# Patient Record
Sex: MALE | Race: WHITE | NOT HISPANIC OR LATINO | Employment: OTHER | URBAN - METROPOLITAN AREA
[De-identification: names, ages, dates, MRNs, and addresses within clinical notes are randomized per-mention and may not be internally consistent; named-entity substitution may affect disease eponyms.]

---

## 2020-01-07 ENCOUNTER — TRANSFERRED RECORDS (OUTPATIENT)
Dept: HEALTH INFORMATION MANAGEMENT | Facility: CLINIC | Age: 60
End: 2020-01-07

## 2020-01-07 LAB
ALT SERPL-CCNC: 50 U/L (ref 0–55)
AST SERPL-CCNC: 88 U/L (ref 10–40)
CREAT SERPL-MCNC: 0.83 MG/DL (ref 0.73–1.18)
GFR SERPL CREATININE-BSD FRML MDRD: >60 ML/MIN/1.73M2
GLUCOSE SERPL-MCNC: 100 MG/DL (ref 70–100)
INR PPP: 1.4 (ref 0.9–1.1)
POTASSIUM SERPL-SCNC: 3.4 MMOL/L (ref 3.5–5.1)

## 2020-01-14 ENCOUNTER — TRANSFERRED RECORDS (OUTPATIENT)
Dept: HEALTH INFORMATION MANAGEMENT | Facility: CLINIC | Age: 60
End: 2020-01-14

## 2020-01-28 ENCOUNTER — TRANSFERRED RECORDS (OUTPATIENT)
Dept: HEALTH INFORMATION MANAGEMENT | Facility: CLINIC | Age: 60
End: 2020-01-28

## 2020-02-06 ENCOUNTER — MEDICAL CORRESPONDENCE (OUTPATIENT)
Dept: HEALTH INFORMATION MANAGEMENT | Facility: CLINIC | Age: 60
End: 2020-02-06

## 2020-02-10 ENCOUNTER — REFERRAL (OUTPATIENT)
Dept: TRANSPLANT | Facility: CLINIC | Age: 60
End: 2020-02-10

## 2020-02-10 VITALS — HEIGHT: 70 IN | BODY MASS INDEX: 25.77 KG/M2 | WEIGHT: 180 LBS

## 2020-02-10 DIAGNOSIS — K76.9 LIVER LESION: ICD-10-CM

## 2020-02-10 DIAGNOSIS — B19.20 HCV (HEPATITIS C VIRUS): Primary | ICD-10-CM

## 2020-02-10 DIAGNOSIS — B19.20 COMPENSATED HCV CIRRHOSIS (H): ICD-10-CM

## 2020-02-10 DIAGNOSIS — Z78.9 ALCOHOL USE: ICD-10-CM

## 2020-02-10 DIAGNOSIS — K74.69 COMPENSATED HCV CIRRHOSIS (H): ICD-10-CM

## 2020-02-10 SDOH — HEALTH STABILITY: MENTAL HEALTH: HOW MANY STANDARD DRINKS CONTAINING ALCOHOL DO YOU HAVE ON A TYPICAL DAY?: 1 OR 2

## 2020-02-10 SDOH — HEALTH STABILITY: MENTAL HEALTH: HOW OFTEN DO YOU HAVE A DRINK CONTAINING ALCOHOL?: 4 OR MORE TIMES A WEEK

## 2020-02-10 ASSESSMENT — MIFFLIN-ST. JEOR: SCORE: 1647.72

## 2020-02-10 NOTE — LETTER
2/12/2020    Dima Olivas  [address]  [place] [address]      Dear Dima,    Thank you for your interest in the Transplant Center at [facility], [facility]. We look forward to being a part of your care team and assisting you through the transplant process.    As we discussed, your transplant coordinator is Sarah Vyas (Liver).  You may call your coordinator at any time with questions or concerns call [phone].    Please complete the following.    1. Fill out and return the enclosed forms    Authorization for Electronic Communication    Authorization to Discuss Protected Health Information    Authorization for Release of Protected Health Information    Authorization for Care Everywhere Release of Information    2. Sign up for:    [employer], access to your electronic medical record (see enclosed pamphlet)    [email or web address], a transplant education website    You can use these tools to learn more about your transplant, communicate with your care team, and track your medical details      Sincerely,    Solid Organ Transplant  [facility], [facility]    cc: Referring Physician and PCP

## 2020-02-10 NOTE — TELEPHONE ENCOUNTER
Patient was asked the following questions during liver intake call.     Referring Provider: Dr. Blayne Man   Referring Diagnosis: HCV/Cirrhosis of the liver   PCP:Dr. Tracy Feliz   Note: Patient is drinking 1-2 drinks nightly.     1)Do you know why you have liver disease: Yes             If Alcoholic Cirrhosis is present when was your last drink: No             Have you ever been through treatment for alcohol: No  2) Presence of Ascites: No Paracentesis: No  3) Presence of Hepatic Encephalopathy: No Medications: No   4) History of GI Bleeding: No  5) EGD: No  6) Colonoscopy: No  7) MELD Score: No recent labs   8) Insurance information: [facility]      Policy vargas: Self       Subscriber/policy/ID number: [id number]      Group Number: [id number]    Referral intake process completed.  Patient is aware that after financial approval is received, medical records will be requested.   Patient confirmed for a callback from transplant coordinator on   Tentative evaluation date TBD.    Confirmed coordinator will discuss evaluation process in more detail at the time of their call.   Patient is aware of the need to arrange age appropriate cancer screening, vaccinations, and dental care.  Reminded patient to complete questionnaire, complete medical records release, and review packet prior to evaluation visit .  Assessed patient for special needs (ie--wheelchair, assistance, guardian, and ):  None  Patient instructed to call [phone] with questions.     RADHA Oh, LPN   Solid Organ Transplant

## 2020-02-17 PROBLEM — K74.69 COMPENSATED HCV CIRRHOSIS (H): Status: ACTIVE | Noted: 2020-02-17

## 2020-02-17 PROBLEM — K76.9 LIVER LESION: Status: ACTIVE | Noted: 2020-02-17

## 2020-02-17 PROBLEM — B19.20 COMPENSATED HCV CIRRHOSIS (H): Status: ACTIVE | Noted: 2020-02-17

## 2020-02-17 PROBLEM — Z78.9 ALCOHOL USE: Status: ACTIVE | Noted: 2020-02-17

## 2020-02-17 NOTE — TELEPHONE ENCOUNTER
58yo with hx HCV cirrhosis referred for transplant evaluation by Dr. Odom at [facility]. HCV never treated. Pt reports having know he had liver disease for many years including having been advised to quit drinking as far back as 2015. Pt last drink 2/9/20. Pt reported commitment to sobriety however not engaged with any CD program at this time as declined referrals. Recent imaging done 1/28/20 showed new liver lesions. Pt denies drug use (hx 30 years ago), he does smoke. Pt does work as a contractor, he lives with his wife who is supportive and involved.     MELD: 15, labs from 1/7/20 at [facility]  Imaging: MRI done 1/28/20 at [facility]   Ascites: none  HE: none  GIB: no hx  EGD: never   Colonoscopy: never     Medical and Surgical Hx: Snap Shot updated and PCP confirmed    Plan:     Review imaging at tumor conference.     Start with consult due to last alcohol. Pt verbalized understanding and comfort with this plan. Message routed to scheduling.

## 2020-02-21 ENCOUNTER — DOCUMENTATION ONLY (OUTPATIENT)
Dept: TRANSPLANT | Facility: CLINIC | Age: 60
End: 2020-02-21

## 2020-02-21 DIAGNOSIS — K76.9 LIVER LESION: Primary | ICD-10-CM

## 2020-02-21 DIAGNOSIS — K74.60 CIRRHOSIS OF LIVER (H): ICD-10-CM

## 2020-02-21 NOTE — PROGRESS NOTES
1/28/20  1.) 3.1 cm LR-M  2.) LR-3 seg 7    rec  - IR for US guided bx of lesion  - consult with Dr. Parish in clinic

## 2020-02-21 NOTE — Clinical Note
Katelyn - I did not call patient, or enter IR referral/set that up. Was waiting for you to call Monday and then send to IR.Thanks, tk

## 2020-02-21 NOTE — Clinical Note
Hello- please schedule pt into held slot for him on 3/4 with Dr. Parish. Additionally, pt needs IR consult scheduled for new liver lesion. He is aware of the plan. Let me know if you have any questions. Thanks, Katelyn

## 2020-02-24 NOTE — PROGRESS NOTES
Pt called and updated of recommendations from tumor conference. IR referral placed and message routed for scheduling. Pt verbalized understanding and comfort with plan.

## 2020-02-27 NOTE — TELEPHONE ENCOUNTER
ace  RECORDS RECEIVED FROM: [facility]IVER [facility] per RB   DATE RECEIVED: 03.04.2020   NOTES STATUS DETAILS   OFFICE NOTE from referring provider Internal 02.26.2020   OFFICE NOTES from other specialists Care Everywhere 12.17.2019   DISCHARGE SUMMARY from hospital N/A    MEDICATION LIST N/A    LIVER BIOSPY (IF APPLICABLE)      PATHOLOGY REPORTS  N/A    IMAGING     ENDOSCOPY (IF AVAILABLE) N/A    COLONOSCOPY (IF AVAILABLE) N/A    ULTRASOUND LIVER Care Everywhere 01.14.2020 [facility]   CT OF ABDOMEN N/A    MRI OF LIVER Care Everywhere 01.28.2020 [facility]   FIBROSCAN, US ELASTOGRAPHY, FIBROSIS SCAN, MR ELASTOGRAPHY N/A    LABS     HEPATIC PANEL (LIVER PANEL) Care Everywhere 01.07.2020   BASIC METABOLIC PANEL Care Everywhere 01.07.2020   COMPLETE METABOLIC PANEL Care Everywhere 01.07.2020   COMPLETE BLOOD COUNT (CBC) Care Everywhere 01.07.2020   INTERNATIONAL NORMALIZED RATIO (INR) Internal 01.07.2020   HEPATITIS C ANTIBODY Care Everywhere 03.26.2015   HEPATITIS C VIRAL LOAD/PCR N/A    HEPATITIS C GENOTYPE N/A    HEPATITIS B SURFACE ANTIGEN N/A    HEPATITIS B SURFACE ANTIBODY N/A    HEPATITIS B DNA QUANT LEVEL N/A    HEPATITIS B CORE ANTIBODY N/A

## 2020-02-28 ENCOUNTER — TELEPHONE (OUTPATIENT)
Dept: VASCULAR SURGERY | Facility: CLINIC | Age: 60
End: 2020-02-28

## 2020-02-28 NOTE — TELEPHONE ENCOUNTER
Called and left a msg informing pt that we were contacted for a liver biopsy per Dr Parish     Informed him that we have him scheduled for a biopsy consult with Rosey HARPER on Tues 3/3 @ 10am.     If he cannot make this appt then he call the scheduling line at [phone].

## 2020-03-02 ENCOUNTER — TELEPHONE (OUTPATIENT)
Dept: GASTROENTEROLOGY | Facility: CLINIC | Age: 60
End: 2020-03-02

## 2020-03-02 NOTE — TELEPHONE ENCOUNTER
DIAGNOSIS: NP Consult Liver biopsy ref Jem Oakes   DATE RECEIVED: 3.3.20   NOTES STATUS DETAILS   OFFICE NOTE from referring provider Internal 2.21.20 Dr. Jem Montoya Eric   OFFICE NOTE from other specialist CE 2.6.20, 1.7.20 Dr. Arpan Odom   DISCHARGE SUMMARY from hospital N/A    DISCHARGE REPORT from the ER N/A    OPERATIVE REPORT N/A    MEDICATION LIST Internal    XRAYS (IMAGES & REPORTS) In Pacs 1.28.20- MR Liver  1.14.20- US Abd RUQ   PATHOLOGY  Slides & report N/A

## 2020-03-03 ENCOUNTER — PRE VISIT (OUTPATIENT)
Dept: INTERVENTIONAL RADIOLOGY/VASCULAR | Facility: CLINIC | Age: 60
End: 2020-03-03

## 2020-03-03 ENCOUNTER — OFFICE VISIT (OUTPATIENT)
Dept: INTERVENTIONAL RADIOLOGY/VASCULAR | Facility: CLINIC | Age: 60
End: 2020-03-03
Payer: COMMERCIAL

## 2020-03-03 VITALS
WEIGHT: 180.2 LBS | OXYGEN SATURATION: 99 % | BODY MASS INDEX: 25.86 KG/M2 | HEART RATE: 82 BPM | SYSTOLIC BLOOD PRESSURE: 134 MMHG | DIASTOLIC BLOOD PRESSURE: 78 MMHG

## 2020-03-03 DIAGNOSIS — K76.9 LIVER LESION: Primary | ICD-10-CM

## 2020-03-03 DIAGNOSIS — K74.69 COMPENSATED HCV CIRRHOSIS (H): ICD-10-CM

## 2020-03-03 DIAGNOSIS — B19.20 COMPENSATED HCV CIRRHOSIS (H): ICD-10-CM

## 2020-03-03 PROBLEM — D69.6 THROMBOCYTOPENIA (H): Status: ACTIVE | Noted: 2020-03-03

## 2020-03-03 LAB
ALBUMIN SERPL-MCNC: 2.4 G/DL (ref 3.4–5)
ALP SERPL-CCNC: 112 U/L (ref 40–150)
ALT SERPL W P-5'-P-CCNC: 73 U/L (ref 0–70)
ANION GAP SERPL CALCULATED.3IONS-SCNC: 5 MMOL/L (ref 3–14)
AST SERPL W P-5'-P-CCNC: 124 U/L (ref 0–45)
BILIRUB DIRECT SERPL-MCNC: 1.2 MG/DL (ref 0–0.2)
BILIRUB SERPL-MCNC: 2.4 MG/DL (ref 0.2–1.3)
BUN SERPL-MCNC: 8 MG/DL (ref 7–30)
CALCIUM SERPL-MCNC: 8.4 MG/DL (ref 8.5–10.1)
CHLORIDE SERPL-SCNC: 111 MMOL/L (ref 94–109)
CO2 SERPL-SCNC: 26 MMOL/L (ref 20–32)
CREAT SERPL-MCNC: 0.76 MG/DL (ref 0.66–1.25)
ERYTHROCYTE [DISTWIDTH] IN BLOOD BY AUTOMATED COUNT: 15.4 % (ref 10–15)
GFR SERPL CREATININE-BSD FRML MDRD: >90 ML/MIN/{1.73_M2}
GLUCOSE SERPL-MCNC: 142 MG/DL (ref 70–99)
HCT VFR BLD AUTO: 41.9 % (ref 40–53)
HGB BLD-MCNC: 14.4 G/DL (ref 13.3–17.7)
INR PPP: 1.43 (ref 0.86–1.14)
MCH RBC QN AUTO: 33.3 PG (ref 26.5–33)
MCHC RBC AUTO-ENTMCNC: 34.4 G/DL (ref 31.5–36.5)
MCV RBC AUTO: 97 FL (ref 78–100)
PLATELET # BLD AUTO: 54 10E9/L (ref 150–450)
POTASSIUM SERPL-SCNC: 3.5 MMOL/L (ref 3.4–5.3)
PROT SERPL-MCNC: 6.5 G/DL (ref 6.8–8.8)
RBC # BLD AUTO: 4.33 10E12/L (ref 4.4–5.9)
SODIUM SERPL-SCNC: 143 MMOL/L (ref 133–144)
WBC # BLD AUTO: 6.3 10E9/L (ref 4–11)

## 2020-03-03 RX ORDER — B-COMPLEX WITH VITAMIN C
250 TABLET ORAL DAILY
COMMUNITY
End: 2021-01-01

## 2020-03-03 ASSESSMENT — ENCOUNTER SYMPTOMS
BLOATING: 0
LIGHT-HEADEDNESS: 0
ABDOMINAL PAIN: 0
HYPERTENSION: 1
FATIGUE: 1
EYE IRRITATION: 1
MYALGIAS: 1
ORTHOPNEA: 0
POLYPHAGIA: 0
JOINT SWELLING: 0
CONSTIPATION: 0
WEIGHT GAIN: 1
NIGHT SWEATS: 1
DOUBLE VISION: 0
WEIGHT LOSS: 1
VOMITING: 0
HALLUCINATIONS: 0
EYE PAIN: 0
INCREASED ENERGY: 0
HEARTBURN: 1
CHILLS: 1
SLEEP DISTURBANCES DUE TO BREATHING: 0
EYE REDNESS: 1
DECREASED APPETITE: 0
BACK PAIN: 1
POLYDIPSIA: 0
BRUISES/BLEEDS EASILY: 1
PALPITATIONS: 0
ALTERED TEMPERATURE REGULATION: 0
SYNCOPE: 0
MUSCLE CRAMPS: 1
HYPOTENSION: 0
FEVER: 0
DIARRHEA: 0
NAUSEA: 0
SWOLLEN GLANDS: 0
NECK PAIN: 1
EYE WATERING: 1
ARTHRALGIAS: 1
LEG PAIN: 0

## 2020-03-03 NOTE — LETTER
3/3/2020       RE: Dima Olivas  [address]  [facility] [address]     Dear Colleague,    Thank you for referring your patient, Dima Olivas, to the [facility] INTERVENTIONAL RADIOLOGY at [facility]. Please see a copy of my visit note below.    First Name: Dima   Age: 59 year old   Referring Physician: Dr. Jem Reyes   REASON FOR REFERRAL: Liver Lesion Biopsy Consultation     Assessment:   Dima is a 59 year old year old male who has a hx of hep C. He is here today as he has been referred by Dr. Jem Reyes to have liver lesion biopsy to evaluate for malignancy    Plan:   Procedure: US guided liver lesion biopsy  Lab: 3/3/2020 - INR 1.43 Platelets 54 Hemoglobin 14.4 - will draw again day of procedure  Medication: No medication changes needed prior to biopsy    HPI:   Dima is a 59-year-old gentleman who is hep C positive, has a history of alcohol abuse, and has a history of IV drug abuse, with a report that he has not used any IV drugs in 30 years. He also has a history of platelet count in the 50's-60's and frequent bloody stools. He was seen by gastroenterology Jan 2020 as requested from his PCP since he has insurance now. While being worked up for hep C treatment, imaging showed hepatic lesions. He was referred from [facility] to [facility] transplant office for further work-up.  His case was presented at the liver tumor conference, Dr. Zimmer was present from Interventional Radiology, his ultrasound and MRi were reviewed there.  The only lesion that can be seen on MRI and ultrasound is the 3.1 cm lesion in segment 8, measuring smaller on ultrasound.  Series 12 Image 14        Past Medical History:   Diagnosis Date     Hepatitis     Hep C, type 3a     Hx of intravenous drug use in remission     quit 30 yrs ago     No past surgical history on file.  Family History   Problem Relation Age of Onset     Unknown/Adopted Mother      Unknown/Adopted Father       Social History     Tobacco Use     Smoking status: Current Every Day Smoker     Packs/day: 0.30     Types: Cigarettes     Smokeless tobacco: Never Used   Substance Use Topics     Alcohol use: Not Currently     Alcohol/week: 7.0 standard drinks     Types: 7 Cans of beer per week     Frequency: 4 or more times a week     Drinks per session: 1 or 2     Comment: 1-2 drinks a day      Patient Active Problem List    Diagnosis Date Noted     Thrombocytopenia (H) 03/03/2020     Priority: Medium     Compensated HCV cirrhosis (H) 02/17/2020     Priority: Medium     Liver lesion 02/17/2020     Priority: Medium     Alcohol use 02/17/2020     Priority: Medium     Bilateral inguinal hernia without obstruction or gangrene, recurrence not specified 12/03/2016     Priority: Medium     Tobacco abuse 02/02/2015     Priority: Medium     Prescription Medications as of 3/3/2020       Rx Number Disp Refills Start End Last Dispensed Date Next Fill Date Owning Pharmacy    vitamin B1 (THIAMINE) 250 MG tablet            Sig: Take 250 mg by mouth daily    Class: Historical    Route: Oral        Allergies:  Patient has no known allergies.  Vital Signs:  VS w/ IP 3/3/2020   SYSTOLIC 134   DIASTOLIC 78   PULSE 82   Wt.(Lbs.) 180.2   Wt. (Kg) 81.738 kg   Ht. (Feet./Inches)    Ht. (Cm)    BMI    O2 Sat 99     ROS:  Answers for HPI/ROS submitted by the patient on 3/3/2020   General Symptoms: Yes  Skin Symptoms: No  HENT Symptoms: No  EYE SYMPTOMS: Yes  HEART SYMPTOMS: Yes  LUNG SYMPTOMS: No  INTESTINAL SYMPTOMS: Yes  URINARY SYMPTOMS: No  REPRODUCTIVE SYMPTOMS: No  SKELETAL SYMPTOMS: Yes  BLOOD SYMPTOMS: Yes  NERVOUS SYSTEM SYMPTOMS: No  MENTAL HEALTH SYMPTOMS: No  Fever: No  Loss of appetite: No  Weight loss: Yes  Weight gain: Yes  Fatigue: Yes  Night sweats: Yes  Chills: Yes  Increased stress: Yes  Excessive hunger: No  Excessive thirst: No  Feeling hot or cold when others believe the temperature is normal: No  Loss of height:  No  Post-operative complications: No  Surgical site pain: No  Hallucinations: No  Change in or Loss of Energy: No  Hyperactivity: No  Confusion: No  Eye pain: No  Vision loss: No  Dry eyes: Yes  Watery eyes: Yes  Eye bulging: No  Double vision: No  Flashing of lights: No  Spots: No  Floaters: No  Redness: Yes  Crossed eyes: No  Tunnel Vision: No  Yellowing of eyes: No  Eye irritation: Yes  Chest pain or pressure: No  Fast or irregular heartbeat: No  Pain in legs with walking: No  Trouble breathing while lying down: No  Fingers or toes appear blue: No  High blood pressure: Yes  Low blood pressure: No  Fainting: No  Murmurs: No  Pacemaker: No  Varicose veins: No  Edema or swelling: No  Wake up at night with shortness of breath: No  Light-headedness: No  Heart burn or indigestion: Yes  Nausea: No  Vomiting: No  Abdominal pain: No  Bloating: No  Constipation: No  Diarrhea: No  Back pain: Yes  Muscle aches: Yes  Neck pain: Yes  Swollen joints: No  Joint pain: Yes  Bone pain: Yes  Muscle cramps: Yes  Anemia: Yes  Swollen glands: No  Easy bleeding or bruising: Yes    Physical Examination: Vital signs reviewed and are Stable  Constitutional: alert and no distress  Cardiovascular: No lifts, heaves, or thrills. RRR. No murmurs, clicks gallops or rub  Respiratory: Good diaphragmatic excursion. Lungs clear  Musculoskeletal: extremities normal- no gross deformities noted, gait normal and normal muscle tone  Neurologic: Gait normal.   Psychiatric: affect normal/bright and mentation appears normal.    BMP:   Lab Results   Component Value Date     03/03/2020    POTASSIUM 3.5 03/03/2020    CHLORIDE 111 (H) 03/03/2020    CO2 26 03/03/2020    ANIONGAP 5 03/03/2020     (H) 03/03/2020    BUN 8 03/03/2020    CR 0.76 03/03/2020    GFRESTIMATED >90 03/03/2020    GFRESTBLACK >90 03/03/2020    HERNESTO 8.4 (L) 03/03/2020      CBC:  Lab Results   Component Value Date    WBC 6.3 03/03/2020    RBC 4.33 (L) 03/03/2020    HGB 14.4  03/03/2020    HCT 41.9 03/03/2020    MCV 97 03/03/2020    MCH 33.3 (H) 03/03/2020    MCHC 34.4 03/03/2020    RDW 15.4 (H) 03/03/2020    PLT 54 (L) 03/03/2020     INR:  Lab Results   Component Value Date    INR 1.43 (H) 03/03/2020     Diagnostic studies:   MRI - 1/28/2020 and ultrasound    PROVIDER NOTE:  MARIBEL Cabrera, CNS  Explained the procedure to Dima and his wife.  This included:    Preparing for the procedure, the actual procedure, and recovery.  The risks of the biopsy:  Bleeding, infection, pain related to the procedure, if he has bleeding he could possibly need for an embolization procedure  Explanation that the results usually return after four to seven business days.    Explanation that he would be contacted by Dr. Jem Reyes's office to determine a future plan.  Thank you for involving us in the care of your patient.    Endy Giordano, RADHAN, RN  DNP AG-CNS Student  [facility]  [employer] of [employer]     Time spent with patient 55 minutes, 50 minutes spent on counseling.   Attending CNS (Rosey Husain, MS, APRN, CNS, CRN) :  I performed a history and physical exam of the patient and discussed the patient's management with the CNS student. I reviewed the student's note and agree with the documented findings and plan of care.    Rosey Husain MS, APRN, CNS, CRN  Clinical Nurse Specialist  Interventional Radiology  Voice mail [phone]  Pager [phone]    CC  Patient Care Team:  Tracy Feliz as PCP - General (Family Practice)  MILLER CHOW NAOMI, YUNIOR

## 2020-03-03 NOTE — LETTER
3/3/2020       RE: Dima Olivas  [address]  [facility] [place] [facility] [address]     Dear Colleague,    Thank you for referring your patient, Dima Olivas, to the [facility] INTERVENTIONAL RADIOLOGY at [facility]. Please see a copy of my visit note below.    First Name: Dima   Age: 59 year old   Referring Physician: Dr. Jem Reyes   REASON FOR REFERRAL: Liver Lesion Biopsy Consultation     Assessment:   Dima is a 59 year old year old male who has a hx of hep C. He is here today as he has been referred by Dr. Jem Reyes to have liver lesion biopsy to evaluate for malignancy    Plan:   Procedure: US guided liver lesion biopsy  Lab: 3/3/2020 - INR 1.43 Platelets 54 Hemoglobin 14.4 - will draw again day of procedure  Medication: No medication changes needed prior to biopsy    HPI:   Dima is a 59-year-old gentleman who is hep C positive, has a history of alcohol abuse, and has a history of IV drug abuse, with a report that he has not used any IV drugs in 30 years. He also has a history of platelet count in the 50's-60's and frequent bloody stools. He was seen by gastroenterology Jan 2020 as requested from his PCP since he has insurance now. While being worked up for hep C treatment, imaging showed hepatic lesions. He was referred from [facility] to [facility] transplant office for further work-up.  His case was presented at the liver tumor conference, Dr. Zimmer was present from Interventional Radiology, his ultrasound and MRi were reviewed there.  The only lesion that can be seen on MRI and ultrasound is the 3.1 cm lesion in segment 8, measuring smaller on ultrasound.  Series 12 Image 14        Past Medical History:   Diagnosis Date     Hepatitis     Hep C, type 3a     Hx of intravenous drug use in remission     quit 30 yrs ago     No past surgical history on file.  Family History   Problem Relation Age of Onset     Unknown/Adopted Mother      Unknown/Adopted Father       Social History     Tobacco Use     Smoking status: Current Every Day Smoker     Packs/day: 0.30     Types: Cigarettes     Smokeless tobacco: Never Used   Substance Use Topics     Alcohol use: Not Currently     Alcohol/week: 7.0 standard drinks     Types: 7 Cans of beer per week     Frequency: 4 or more times a week     Drinks per session: 1 or 2     Comment: 1-2 drinks a day      Patient Active Problem List    Diagnosis Date Noted     Thrombocytopenia (H) 03/03/2020     Priority: Medium     Compensated HCV cirrhosis (H) 02/17/2020     Priority: Medium     Liver lesion 02/17/2020     Priority: Medium     Alcohol use 02/17/2020     Priority: Medium     Bilateral inguinal hernia without obstruction or gangrene, recurrence not specified 12/03/2016     Priority: Medium     Tobacco abuse 02/02/2015     Priority: Medium     Prescription Medications as of 3/3/2020       Rx Number Disp Refills Start End Last Dispensed Date Next Fill Date Owning Pharmacy    vitamin B1 (THIAMINE) 250 MG tablet            Sig: Take 250 mg by mouth daily    Class: Historical    Route: Oral        Allergies:  Patient has no known allergies.  Vital Signs:  VS w/ IP 3/3/2020   SYSTOLIC 134   DIASTOLIC 78   PULSE 82   Wt.(Lbs.) 180.2   Wt. (Kg) 81.738 kg   Ht. (Feet./Inches)    Ht. (Cm)    BMI    O2 Sat 99     ROS:  Answers for HPI/ROS submitted by the patient on 3/3/2020   General Symptoms: Yes  Skin Symptoms: No  HENT Symptoms: No  EYE SYMPTOMS: Yes  HEART SYMPTOMS: Yes  LUNG SYMPTOMS: No  INTESTINAL SYMPTOMS: Yes  URINARY SYMPTOMS: No  REPRODUCTIVE SYMPTOMS: No  SKELETAL SYMPTOMS: Yes  BLOOD SYMPTOMS: Yes  NERVOUS SYSTEM SYMPTOMS: No  MENTAL HEALTH SYMPTOMS: No  Fever: No  Loss of appetite: No  Weight loss: Yes  Weight gain: Yes  Fatigue: Yes  Night sweats: Yes  Chills: Yes  Increased stress: Yes  Excessive hunger: No  Excessive thirst: No  Feeling hot or cold when others believe the temperature is normal: No  Loss of height:  No  Post-operative complications: No  Surgical site pain: No  Hallucinations: No  Change in or Loss of Energy: No  Hyperactivity: No  Confusion: No  Eye pain: No  Vision loss: No  Dry eyes: Yes  Watery eyes: Yes  Eye bulging: No  Double vision: No  Flashing of lights: No  Spots: No  Floaters: No  Redness: Yes  Crossed eyes: No  Tunnel Vision: No  Yellowing of eyes: No  Eye irritation: Yes  Chest pain or pressure: No  Fast or irregular heartbeat: No  Pain in legs with walking: No  Trouble breathing while lying down: No  Fingers or toes appear blue: No  High blood pressure: Yes  Low blood pressure: No  Fainting: No  Murmurs: No  Pacemaker: No  Varicose veins: No  Edema or swelling: No  Wake up at night with shortness of breath: No  Light-headedness: No  Heart burn or indigestion: Yes  Nausea: No  Vomiting: No  Abdominal pain: No  Bloating: No  Constipation: No  Diarrhea: No  Back pain: Yes  Muscle aches: Yes  Neck pain: Yes  Swollen joints: No  Joint pain: Yes  Bone pain: Yes  Muscle cramps: Yes  Anemia: Yes  Swollen glands: No  Easy bleeding or bruising: Yes    Physical Examination: Vital signs reviewed and are Stable  Constitutional: alert and no distress  Cardiovascular: No lifts, heaves, or thrills. RRR. No murmurs, clicks gallops or rub  Respiratory: Good diaphragmatic excursion. Lungs clear  Musculoskeletal: extremities normal- no gross deformities noted, gait normal and normal muscle tone  Neurologic: Gait normal.   Psychiatric: affect normal/bright and mentation appears normal.    BMP:   Lab Results   Component Value Date     03/03/2020    POTASSIUM 3.5 03/03/2020    CHLORIDE 111 (H) 03/03/2020    CO2 26 03/03/2020    ANIONGAP 5 03/03/2020     (H) 03/03/2020    BUN 8 03/03/2020    CR 0.76 03/03/2020    GFRESTIMATED >90 03/03/2020    GFRESTBLACK >90 03/03/2020    HERNESTO 8.4 (L) 03/03/2020      CBC:  Lab Results   Component Value Date    WBC 6.3 03/03/2020    RBC 4.33 (L) 03/03/2020    HGB 14.4  03/03/2020    HCT 41.9 03/03/2020    MCV 97 03/03/2020    MCH 33.3 (H) 03/03/2020    MCHC 34.4 03/03/2020    RDW 15.4 (H) 03/03/2020    PLT 54 (L) 03/03/2020     INR:  Lab Results   Component Value Date    INR 1.43 (H) 03/03/2020     Diagnostic studies:   MRI - 1/28/2020 and ultrasound    PROVIDER NOTE:  MARIBEL Cabrera, CNS  Explained the procedure to Dima and his wife.  This included:    Preparing for the procedure, the actual procedure, and recovery.  The risks of the biopsy:  Bleeding, infection, pain related to the procedure, if he has bleeding he could possibly need for an embolization procedure  Explanation that the results usually return after four to seven business days.    Explanation that he would be contacted by Dr. Jem Reyes's office to determine a future plan.  Thank you for involving us in the care of your patient.    Endy Giordano, RADHAN, RN  DNP AG-CNS Student  [facility]  [employer] of Nursing     Time spent with patient 55 minutes, 50 minutes spent on counseling.   Attending CNS (Rosey Husain MS, MARIBEL, CNS, CRN) :  I performed a history and physical exam of the patient and discussed the patient's management with the CNS student. I reviewed the student's note and agree with the documented findings and plan of care.    Rosey Husain MS, MARIBEL, CNS, CRN  Clinical Nurse Specialist  Interventional Radiology  Voice mail [phone]  Pager [phone]  CC  Patient Care Team:  Tracy Feliz as PCP - General (Family Practice)  YUNIOR WITT      Again, thank you for allowing me to participate in the care of your patient.      Sincerely,    MARIBEL Boyd

## 2020-03-03 NOTE — PATIENT INSTRUCTIONS
You are scheduled for your biopsy on Wednesday, March 11, 2020  Report to the [facility] Waiting room at 8:00 AM  The [facility] Waiting Room is located on the 2nd floor (street level) of the [address]  Your procedure is scheduled to start at approximately 9:30 AM    No solid foods or milk products for 6 hours prior on the day of the procedure 3:30 AM  You may have clear liquids until 2 hours prior on the day of the procedure.(water, apple juice, broth, coffee or tea without milk or sugar, jell-o, white grape juice)  7:30 AM    You will need a     If you have any questions you may call the Radiology Nurse Line [phone]

## 2020-03-03 NOTE — PROGRESS NOTES
First Name: Dima   Age: 59 year old   Referring Physician: Dr. Jem Reyes   REASON FOR REFERRAL: Liver Lesion Biopsy Consultation     Assessment:   Dima is a 59 year old year old male who has a hx of hep C. He is here today as he has been referred by Dr. Jem Reyes to have liver lesion biopsy to evaluate for malignancy    Plan:   Procedure: US guided liver lesion biopsy  Lab: 3/3/2020 - INR 1.43 Platelets 54 Hemoglobin 14.4 - will draw again day of procedure  Medication: No medication changes needed prior to biopsy    HPI:   Dima is a 59-year-old gentleman who is hep C positive, has a history of alcohol abuse, and has a history of IV drug abuse, with a report that he has not used any IV drugs in 30 years. He also has a history of platelet count in the 50's-60's and frequent bloody stools. He was seen by gastroenterology Jan 2020 as requested from his PCP since he has insurance now. While being worked up for hep C treatment, imaging showed hepatic lesions. He was referred from [facility] transplant office for further work-up.  His case was presented at the liver tumor conference, Dr. Zimmer was present from Interventional Radiology, his ultrasound and MRi were reviewed there.  The only lesion that can be seen on MRI and ultrasound is the 3.1 cm lesion in segment 8, measuring smaller on ultrasound.  Series 12 Image 14        Past Medical History:   Diagnosis Date     Hepatitis     Hep C, type 3a     Hx of intravenous drug use in remission     quit 30 yrs ago     No past surgical history on file.  Family History   Problem Relation Age of Onset     Unknown/Adopted Mother      Unknown/Adopted Father      Social History     Tobacco Use     Smoking status: Current Every Day Smoker     Packs/day: 0.30     Types: Cigarettes     Smokeless tobacco: Never Used   Substance Use Topics     Alcohol use: Not Currently     Alcohol/week: 7.0 standard drinks     Types: 7 Cans of beer per week      Frequency: 4 or more times a week     Drinks per session: 1 or 2     Comment: 1-2 drinks a day      Patient Active Problem List    Diagnosis Date Noted     Thrombocytopenia (H) 03/03/2020     Priority: Medium     Compensated HCV cirrhosis (H) 02/17/2020     Priority: Medium     Liver lesion 02/17/2020     Priority: Medium     Alcohol use 02/17/2020     Priority: Medium     Bilateral inguinal hernia without obstruction or gangrene, recurrence not specified 12/03/2016     Priority: Medium     Tobacco abuse 02/02/2015     Priority: Medium     Prescription Medications as of 3/3/2020       Rx Number Disp Refills Start End Last Dispensed Date Next Fill Date Owning Pharmacy    vitamin B1 (THIAMINE) 250 MG tablet            Sig: Take 250 mg by mouth daily    Class: Historical    Route: Oral        Allergies:  Patient has no known allergies.  Vital Signs:  VS w/ IP 3/3/2020   SYSTOLIC 134   DIASTOLIC 78   PULSE 82   Wt.(Lbs.) 180.2   Wt. (Kg) 81.738 kg   Ht. (Feet./Inches)    Ht. (Cm)    BMI    O2 Sat 99     ROS:  Answers for HPI/ROS submitted by the patient on 3/3/2020   General Symptoms: Yes  Skin Symptoms: No  HENT Symptoms: No  EYE SYMPTOMS: Yes  HEART SYMPTOMS: Yes  LUNG SYMPTOMS: No  INTESTINAL SYMPTOMS: Yes  URINARY SYMPTOMS: No  REPRODUCTIVE SYMPTOMS: No  SKELETAL SYMPTOMS: Yes  BLOOD SYMPTOMS: Yes  NERVOUS SYSTEM SYMPTOMS: No  MENTAL HEALTH SYMPTOMS: No  Fever: No  Loss of appetite: No  Weight loss: Yes  Weight gain: Yes  Fatigue: Yes  Night sweats: Yes  Chills: Yes  Increased stress: Yes  Excessive hunger: No  Excessive thirst: No  Feeling hot or cold when others believe the temperature is normal: No  Loss of height: No  Post-operative complications: No  Surgical site pain: No  Hallucinations: No  Change in or Loss of Energy: No  Hyperactivity: No  Confusion: No  Eye pain: No  Vision loss: No  Dry eyes: Yes  Watery eyes: Yes  Eye bulging: No  Double vision: No  Flashing of lights: No  Spots: No  Floaters:  No  Redness: Yes  Crossed eyes: No  Tunnel Vision: No  Yellowing of eyes: No  Eye irritation: Yes  Chest pain or pressure: No  Fast or irregular heartbeat: No  Pain in legs with walking: No  Trouble breathing while lying down: No  Fingers or toes appear blue: No  High blood pressure: Yes  Low blood pressure: No  Fainting: No  Murmurs: No  Pacemaker: No  Varicose veins: No  Edema or swelling: No  Wake up at night with shortness of breath: No  Light-headedness: No  Heart burn or indigestion: Yes  Nausea: No  Vomiting: No  Abdominal pain: No  Bloating: No  Constipation: No  Diarrhea: No  Back pain: Yes  Muscle aches: Yes  Neck pain: Yes  Swollen joints: No  Joint pain: Yes  Bone pain: Yes  Muscle cramps: Yes  Anemia: Yes  Swollen glands: No  Easy bleeding or bruising: Yes    Physical Examination: Vital signs reviewed and are Stable  Constitutional: alert and no distress  Cardiovascular: No lifts, heaves, or thrills. RRR. No murmurs, clicks gallops or rub  Respiratory: Good diaphragmatic excursion. Lungs clear  Musculoskeletal: extremities normal- no gross deformities noted, gait normal and normal muscle tone  Neurologic: Gait normal.   Psychiatric: affect normal/bright and mentation appears normal.    BMP:   Lab Results   Component Value Date     03/03/2020    POTASSIUM 3.5 03/03/2020    CHLORIDE 111 (H) 03/03/2020    CO2 26 03/03/2020    ANIONGAP 5 03/03/2020     (H) 03/03/2020    BUN 8 03/03/2020    CR 0.76 03/03/2020    GFRESTIMATED >90 03/03/2020    GFRESTBLACK >90 03/03/2020    HERNESTO 8.4 (L) 03/03/2020      CBC:  Lab Results   Component Value Date    WBC 6.3 03/03/2020    RBC 4.33 (L) 03/03/2020    HGB 14.4 03/03/2020    HCT 41.9 03/03/2020    MCV 97 03/03/2020    MCH 33.3 (H) 03/03/2020    MCHC 34.4 03/03/2020    RDW 15.4 (H) 03/03/2020    PLT 54 (L) 03/03/2020     INR:  Lab Results   Component Value Date    INR 1.43 (H) 03/03/2020     Diagnostic studies:   MRI - 1/28/2020 and ultrasound    PROVIDER  NOTE:  MARIBEL Cabrera, CNS  Explained the procedure to Dima and his wife.  This included:    Preparing for the procedure, the actual procedure, and recovery.  The risks of the biopsy:  Bleeding, infection, pain related to the procedure, if he has bleeding he could possibly need for an embolization procedure  Explanation that the results usually return after four to seven business days.    Explanation that he would be contacted by Dr. Jem Reyes's office to determine a future plan.  Thank you for involving us in the care of your patient.    Endy Giordano, RADHAN, RN  DNP AG-CNS Student  [facility]  [employer]     Time spent with patient 55 minutes, 50 minutes spent on counseling.   Attending CNS (Rosey Husain MS, MARIBEL, CNS, CRN) :  I performed a history and physical exam of the patient and discussed the patient's management with the CNS student. I reviewed the student's note and agree with the documented findings and plan of care.    Rosey Husain MS, APRN, CNS, CRN  Clinical Nurse Specialist  Interventional Radiology  Voice mail [phone]  Pager [phone]  CC  Patient Care Team:  Tracy Feliz as PCP - General (Family Practice)  MILLER CHOW NAOMI, YUNIOR

## 2020-03-04 ENCOUNTER — PRE VISIT (OUTPATIENT)
Dept: GASTROENTEROLOGY | Facility: CLINIC | Age: 60
End: 2020-03-04

## 2020-03-04 ENCOUNTER — HOSPITAL ENCOUNTER (OUTPATIENT)
Facility: CLINIC | Age: 60
End: 2020-03-04
Attending: INTERNAL MEDICINE | Admitting: INTERNAL MEDICINE
Payer: COMMERCIAL

## 2020-03-04 ENCOUNTER — OFFICE VISIT (OUTPATIENT)
Dept: GASTROENTEROLOGY | Facility: CLINIC | Age: 60
End: 2020-03-04
Attending: INTERNAL MEDICINE
Payer: COMMERCIAL

## 2020-03-04 VITALS
WEIGHT: 197.4 LBS | DIASTOLIC BLOOD PRESSURE: 77 MMHG | SYSTOLIC BLOOD PRESSURE: 139 MMHG | TEMPERATURE: 98.2 F | HEART RATE: 87 BPM | BODY MASS INDEX: 28.32 KG/M2 | OXYGEN SATURATION: 100 %

## 2020-03-04 DIAGNOSIS — B19.20 COMPENSATED HCV CIRRHOSIS (H): ICD-10-CM

## 2020-03-04 DIAGNOSIS — K74.69 COMPENSATED HCV CIRRHOSIS (H): ICD-10-CM

## 2020-03-04 DIAGNOSIS — Z12.12 SCREENING FOR COLORECTAL CANCER: ICD-10-CM

## 2020-03-04 DIAGNOSIS — K76.9 LIVER LESION: Primary | ICD-10-CM

## 2020-03-04 DIAGNOSIS — Z12.11 SCREENING FOR COLORECTAL CANCER: ICD-10-CM

## 2020-03-04 PROCEDURE — G0463 HOSPITAL OUTPT CLINIC VISIT: HCPCS | Mod: ZF

## 2020-03-04 ASSESSMENT — PAIN SCALES - GENERAL: PAINLEVEL: NO PAIN (0)

## 2020-03-04 NOTE — LETTER
3/4/2020      RE: Dima NAVAS Skaar  [address]  [place] [address]       [facility]    Hepatology New Patient Visit    Referring provider:  Blayne Man MD      59 year old male    Chief complaint:  Liver mass    HPI:  Cirrhosis  - dx Jan 2020  - HCV/ETOH  - no hx HE, ascites or variceal bleed  - no prior EGD  - HCC screening- see below    HCV  - dx 2009  - hx IVDU, YOSHI 1980s  - GT-3  - no prior rx    The patient comes to clinic this morning with his wife for further evaluation and management of liver lesion.  This was first identified on a screening ultrasound, which was followed with MRI liver in 01/2020.  There is a 3.1x2.5cm lesion in segment 8 as well as a 1.2x1.0 cm LI-RADS 3 lesion in segment 7.  The patient has seen interventional radiology and is due to undergo a liver lesion biopsy next week.      The patient was diagnosed with cirrhosis in 01/2020 after re-presenting to the GI Clinic at [facility] for consideration of treatment for hepatitis C.  He initially had been seen in 2009 for hepatitis C at which point he was not insured and also drinking large amounts of alcohol.  He has thus never been treated for hepatitis C.  He has not undergone screening for esophageal varices.      The patient is well today.  He is still trying to process the fact that he may need a liver transplant in the future and is ambivalent regarding transplant.  He denies any symptoms specific to liver disease.      The patient denies jaundice, abdominal distention, lower extremity edema, lethargy or confusion.      The patient denies melena, hematemesis or hematochezia.      The patient denies fevers, sweats or chills.  He declines an influenza vaccination today.      The patient denies any recent weight loss.  Appetite is good.  The patient reports intermittent bright red blood per rectum which is improved over the past month or so.  He has never had a screening colonoscopy.      The  patient last drank alcohol in 02/2020.  He has a history of alcohol abuse for which he has been to AA in the past and has also attended rehab 3-4 times as well as counseling, most recently around the year 2000.  The patient has drank alcohol since his teens and at one point was drinking a quart of vodka per day for 20 years.      The patient has a history of intravenous and intranasal drug use in the 1980s, which is how he acquired hepatitis C.  He smoked marijuana in the past but denies any current use.      The patient is a current smoker for the past 40 years.  At most, he was smoking was 1-2 packs per day but is currently smoking 3 cigarettes per day.     Medical hx Surgical hx   Past Medical History:   Diagnosis Date     Cirrhosis (H) 01/2020     Hepatitis C 2009     Hx of intravenous drug use in remission     quit 30 yrs ago     Smoker       Past Surgical History:   Procedure Laterality Date     NO HISTORY OF SURGERY            Medications  Prior to Admission medications    Medication Sig Start Date End Date Taking? Authorizing Provider   vitamin B1 (THIAMINE) 250 MG tablet Take 250 mg by mouth daily   Yes Reported, Patient       Allergies  No Known Allergies    Family hx Social hx   Family History   Problem Relation Age of Onset     Unknown/Adopted Mother      Unknown/Adopted Father       Social History     Tobacco Use     Smoking status: Current Every Day Smoker     Packs/day: 0.30     Types: Cigarettes     Smokeless tobacco: Never Used   Substance Use Topics     Alcohol use: Yes     Alcohol/week: 7.0 standard drinks     Types: 7 Cans of beer per week     Frequency: 4 or more times a week     Drinks per session: 1 or 2     Comment: 1-2 drinks a day      Drug use: No     Lives in [place] 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     Review of systems  A 10-point review of systems was negative.    Examination  /77 (BP Location: Right arm, Patient Position: Sitting, Cuff  Size: Adult Regular)   Pulse 87   Temp 98.2  F (36.8  C) (Oral)   Wt 89.5 kg (197 lb 6.4 oz)   SpO2 100%   BMI 28.32 kg/m     Body mass index is 28.32 kg/m .    Gen- well, NAD, A+Ox3, normal color  Eye- EOMI  ENT- MMM, normal oropharynx  Lym- no palpable lymphadenopathy  CVS- S1, S2 normal, no added sounds, RRR  RS- CTA  Abd- soft, non-tender, palpable liver edge, no ascites or splenomegaly on palpation or percussion, BS+  Extr- pulses good, no CARMEN  MS- hands normal- no clubbing  Neuro- A+Ox3, no asterixis  Skin- telangiectasia on chest wall, no jaundice  Psych- normal mood    Laboratory  Lab Results   Component Value Date     03/03/2020    POTASSIUM 3.5 03/03/2020    CHLORIDE 111 03/03/2020    CO2 26 03/03/2020    BUN 8 03/03/2020    CR 0.76 03/03/2020       Lab Results   Component Value Date    BILITOTAL 2.4 03/03/2020    ALT 73 03/03/2020     03/03/2020    ALKPHOS 112 03/03/2020       Lab Results   Component Value Date    ALBUMIN 2.4 03/03/2020    PROTTOTAL 6.5 03/03/2020        Lab Results   Component Value Date    WBC 6.3 03/03/2020    HGB 14.4 03/03/2020    MCV 97 03/03/2020    PLT 54 03/03/2020       Lab Results   Component Value Date    INR 1.43 03/03/2020         Radiology  MRI liver 1/28/2020 personally reviewed    Assessment  59-year-old male with history of compensated HCV/ETOH cirrhosis who presents for further evaluation and management of liver lesion.  MELD-Na= 14.  Last ETOH= 02/2020.  No evidence of ascites or hepatic encephalopathy.  Will obtain liver lesion biopsy to rule in/out HCC which will determine overall future management although the patient is ambivalent to transplant at this time.  Will need upper endoscopy to screen for esophageal varices.  Will also need colonoscopy to evaluate hematochezia in the absence of prior colorectal cancer screening.      We discussed the natural history and complications of cirrhosis; the effect of alcohol use on progression of liver  disease; indications for liver transplant; staging and management of HCC including loco-regional therapy, hepatic resection and liver transplant; the liver transplant evaluation process and the minimum sobriety requirement of 6 months at the [facility] liver transplant program.     Plan  1.  Liver lesion biopsy as planned  2.  CT chest, bone scan if lesion bx consistent with HCC  3.  Chemical dependency evaluation  4.  EGD/colonoscopy  5.  Follow-up in 3 months    Salvador Parish MD  Hepatology  [facility]    I spent a total of 60 minutes face-to-face with Dima Olivas during today's office visit. Over 50% of this time was spent counseling the patient and/or coordinating care regarding natural history and complications of cirrhosis; the effect of alcohol use on progression of liver disease; indications for liver transplant; staging and management of HCC including loco-regional therapy, hepatic resection and liver transplant; the liver transplant evaluation process and the minimum sobriety requirement of 6 months at the [facility] liver transplant program.  See note for details.      Salvador Parish MD

## 2020-03-04 NOTE — NURSING NOTE
Chief Complaint   Patient presents with     Consult     Compensated HCV cirrhosis            /77 (BP Location: Right arm, Patient Position: Sitting, Cuff Size: Adult Regular)   Pulse 87   Temp 98.2  F (36.8  C) (Oral)   Wt 89.5 kg (197 lb 6.4 oz)   SpO2 100%   BMI 28.32 kg/m          Smitha Alves CMA    3/4/2020 8:59 AM

## 2020-03-04 NOTE — PROGRESS NOTES
[facility]    Hepatology New Patient Visit    Referring provider:  Blayne Man MD      59 year old male    Chief complaint:  Liver mass    HPI:  Cirrhosis  - dx Jan 2020  - HCV/ETOH  - no hx HE, ascites or variceal bleed  - no prior EGD  - HCC screening- see below    HCV  - dx 2009  - hx IVDU, YOSHI 1980s  - GT-3  - no prior rx    The patient comes to clinic this morning with his wife for further evaluation and management of liver lesion.  This was first identified on a screening ultrasound, which was followed with MRI liver in 01/2020.  There is a 3.1x2.5cm lesion in segment 8 as well as a 1.2x1.0 cm LI-RADS 3 lesion in segment 7.  The patient has seen interventional radiology and is due to undergo a liver lesion biopsy next week.      The patient was diagnosed with cirrhosis in 01/2020 after re-presenting to the GI Clinic at [facility] for consideration of treatment for hepatitis C.  He initially had been seen in 2009 for hepatitis C at which point he was not insured and also drinking large amounts of alcohol.  He has thus never been treated for hepatitis C.  He has not undergone screening for esophageal varices.      The patient is well today.  He is still trying to process the fact that he may need a liver transplant in the future and is ambivalent regarding transplant.  He denies any symptoms specific to liver disease.      The patient denies jaundice, abdominal distention, lower extremity edema, lethargy or confusion.      The patient denies melena, hematemesis or hematochezia.      The patient denies fevers, sweats or chills.  He declines an influenza vaccination today.      The patient denies any recent weight loss.  Appetite is good.  The patient reports intermittent bright red blood per rectum which is improved over the past month or so.  He has never had a screening colonoscopy.      The patient last drank alcohol in 02/2020.  He has a history of alcohol abuse for which  he has been to  in the past and has also attended rehab 3-4 times as well as counseling, most recently around the year 2000.  The patient has drank alcohol since his teens and at one point was drinking a quart of vodka per day for 20 years.      The patient has a history of intravenous and intranasal drug use in the 1980s, which is how he acquired hepatitis C.  He smoked marijuana in the past but denies any current use.      The patient is a current smoker for the past 40 years.  At most, he was smoking was 1-2 packs per day but is currently smoking 3 cigarettes per day.     Medical hx Surgical hx   Past Medical History:   Diagnosis Date     Cirrhosis (H) 01/2020     Hepatitis C 2009     Hx of intravenous drug use in remission     quit 30 yrs ago     Smoker       Past Surgical History:   Procedure Laterality Date     NO HISTORY OF SURGERY            Medications  Prior to Admission medications    Medication Sig Start Date End Date Taking? Authorizing Provider   vitamin B1 (THIAMINE) 250 MG tablet Take 250 mg by mouth daily   Yes Reported, Patient       Allergies  No Known Allergies    Family hx Social hx   Family History   Problem Relation Age of Onset     Unknown/Adopted Mother      Unknown/Adopted Father       Social History     Tobacco Use     Smoking status: Current Every Day Smoker     Packs/day: 0.30     Types: Cigarettes     Smokeless tobacco: Never Used   Substance Use Topics     Alcohol use: Yes     Alcohol/week: 7.0 standard drinks     Types: 7 Cans of beer per week     Frequency: 4 or more times a week     Drinks per session: 1 or 2     Comment: 1-2 drinks a day      Drug use: No     Lives in [place] 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     Review of systems  A 10-point review of systems was negative.    Examination  /77 (BP Location: Right arm, Patient Position: Sitting, Cuff Size: Adult Regular)   Pulse 87   Temp 98.2  F (36.8  C) (Oral)   Wt 89.5 kg (197  lb 6.4 oz)   SpO2 100%   BMI 28.32 kg/m    Body mass index is 28.32 kg/m .    Gen- well, NAD, A+Ox3, normal color  Eye- EOMI  ENT- MMM, normal oropharynx  Lym- no palpable lymphadenopathy  CVS- S1, S2 normal, no added sounds, RRR  RS- CTA  Abd- soft, non-tender, palpable liver edge, no ascites or splenomegaly on palpation or percussion, BS+  Extr- pulses good, no CARMEN  MS- hands normal- no clubbing  Neuro- A+Ox3, no asterixis  Skin- telangiectasia on chest wall, no jaundice  Psych- normal mood    Laboratory  Lab Results   Component Value Date     03/03/2020    POTASSIUM 3.5 03/03/2020    CHLORIDE 111 03/03/2020    CO2 26 03/03/2020    BUN 8 03/03/2020    CR 0.76 03/03/2020       Lab Results   Component Value Date    BILITOTAL 2.4 03/03/2020    ALT 73 03/03/2020     03/03/2020    ALKPHOS 112 03/03/2020       Lab Results   Component Value Date    ALBUMIN 2.4 03/03/2020    PROTTOTAL 6.5 03/03/2020        Lab Results   Component Value Date    WBC 6.3 03/03/2020    HGB 14.4 03/03/2020    MCV 97 03/03/2020    PLT 54 03/03/2020       Lab Results   Component Value Date    INR 1.43 03/03/2020         Radiology  MRI liver 1/28/2020 personally reviewed    Assessment  59-year-old male with history of compensated HCV/ETOH cirrhosis who presents for further evaluation and management of liver lesion.  MELD-Na= 14.  Last ETOH= 02/2020.  No evidence of ascites or hepatic encephalopathy.  Will obtain liver lesion biopsy to rule in/out HCC which will determine overall future management although the patient is ambivalent to transplant at this time.  Will need upper endoscopy to screen for esophageal varices.  Will also need colonoscopy to evaluate hematochezia in the absence of prior colorectal cancer screening.      We discussed the natural history and complications of cirrhosis; the effect of alcohol use on progression of liver disease; indications for liver transplant; staging and management of HCC including  loco-regional therapy, hepatic resection and liver transplant; the liver transplant evaluation process and the minimum sobriety requirement of 6 months at the [facility] liver transplant program.     Plan  1.  Liver lesion biopsy as planned  2.  CT chest, bone scan if lesion bx consistent with HCC  3.  Chemical dependency evaluation  4.  EGD/colonoscopy  5.  Follow-up in 3 months    Salvador Parish MD  Hepatology  [facility]    I spent a total of 60 minutes face-to-face with Dima Olivas during today's office visit. Over 50% of this time was spent counseling the patient and/or coordinating care regarding natural history and complications of cirrhosis; the effect of alcohol use on progression of liver disease; indications for liver transplant; staging and management of HCC including loco-regional therapy, hepatic resection and liver transplant; the liver transplant evaluation process and the minimum sobriety requirement of 6 months at the [facility] liver transplant program.  See note for details.

## 2020-03-04 NOTE — PATIENT INSTRUCTIONS
"Plan  1.  No alcohol  2.  Upper endoscopy to look for veins in the esophagus (\"varices\") related to liver disease  3.  Colonoscopy to screen for colon cancer (routine care)  4.  Liver lesion biopsy next week  5.  Follow-up with me in 3 months    Salvador Parish MD  Hepatology  [facility]  "

## 2020-03-11 ENCOUNTER — APPOINTMENT (OUTPATIENT)
Dept: MEDSURG UNIT | Facility: CLINIC | Age: 60
End: 2020-03-11
Attending: INTERNAL MEDICINE
Payer: COMMERCIAL

## 2020-03-11 ENCOUNTER — HOSPITAL ENCOUNTER (OUTPATIENT)
Facility: CLINIC | Age: 60
Discharge: HOME | End: 2020-03-11
Attending: INTERNAL MEDICINE | Admitting: RADIOLOGY
Payer: COMMERCIAL

## 2020-03-11 ENCOUNTER — APPOINTMENT (OUTPATIENT)
Dept: INTERVENTIONAL RADIOLOGY/VASCULAR | Facility: CLINIC | Age: 60
End: 2020-03-11
Attending: CLINICAL NURSE SPECIALIST
Payer: COMMERCIAL

## 2020-03-11 VITALS
OXYGEN SATURATION: 96 % | SYSTOLIC BLOOD PRESSURE: 119 MMHG | HEART RATE: 82 BPM | DIASTOLIC BLOOD PRESSURE: 70 MMHG | RESPIRATION RATE: 16 BRPM | TEMPERATURE: 98.5 F

## 2020-03-11 DIAGNOSIS — K74.69 COMPENSATED HCV CIRRHOSIS (H): ICD-10-CM

## 2020-03-11 DIAGNOSIS — B19.20 COMPENSATED HCV CIRRHOSIS (H): ICD-10-CM

## 2020-03-11 DIAGNOSIS — K76.9 LIVER LESION: ICD-10-CM

## 2020-03-11 PROCEDURE — 99152 MOD SED SAME PHYS/QHP 5/>YRS: CPT

## 2020-03-11 PROCEDURE — 88313 SPECIAL STAINS GROUP 2: CPT | Performed by: INTERNAL MEDICINE

## 2020-03-11 PROCEDURE — 88342 IMHCHEM/IMCYTCHM 1ST ANTB: CPT | Performed by: INTERNAL MEDICINE

## 2020-03-11 PROCEDURE — 25000125 ZZHC RX 250: Performed by: STUDENT IN AN ORGANIZED HEALTH CARE EDUCATION/TRAINING PROGRAM

## 2020-03-11 PROCEDURE — 99153 MOD SED SAME PHYS/QHP EA: CPT

## 2020-03-11 PROCEDURE — 27810159 ZZH COIL/EMBOLIC DEVICE CR8

## 2020-03-11 PROCEDURE — 40000167 ZZH STATISTIC PP CARE STAGE 2

## 2020-03-11 PROCEDURE — 88307 TISSUE EXAM BY PATHOLOGIST: CPT | Performed by: INTERNAL MEDICINE

## 2020-03-11 PROCEDURE — 25000128 H RX IP 250 OP 636: Performed by: STUDENT IN AN ORGANIZED HEALTH CARE EDUCATION/TRAINING PROGRAM

## 2020-03-11 PROCEDURE — 25800030 ZZH RX IP 258 OP 636: Performed by: RADIOLOGY

## 2020-03-11 PROCEDURE — 76942 ECHO GUIDE FOR BIOPSY: CPT

## 2020-03-11 RX ORDER — FLUMAZENIL 0.1 MG/ML
0.2 INJECTION, SOLUTION INTRAVENOUS
Status: DISCONTINUED | OUTPATIENT
Start: 2020-03-11 | End: 2020-03-11 | Stop reason: HOSPADM

## 2020-03-11 RX ORDER — SODIUM CHLORIDE 9 MG/ML
INJECTION, SOLUTION INTRAVENOUS CONTINUOUS
Status: DISCONTINUED | OUTPATIENT
Start: 2020-03-11 | End: 2020-03-11 | Stop reason: HOSPADM

## 2020-03-11 RX ORDER — NICOTINE POLACRILEX 4 MG
15-30 LOZENGE BUCCAL
Status: DISCONTINUED | OUTPATIENT
Start: 2020-03-11 | End: 2020-03-11 | Stop reason: HOSPADM

## 2020-03-11 RX ORDER — LIDOCAINE 40 MG/G
CREAM TOPICAL
Status: DISCONTINUED | OUTPATIENT
Start: 2020-03-11 | End: 2020-03-11 | Stop reason: HOSPADM

## 2020-03-11 RX ORDER — NALOXONE HYDROCHLORIDE 0.4 MG/ML
.1-.4 INJECTION, SOLUTION INTRAMUSCULAR; INTRAVENOUS; SUBCUTANEOUS
Status: DISCONTINUED | OUTPATIENT
Start: 2020-03-11 | End: 2020-03-11 | Stop reason: HOSPADM

## 2020-03-11 RX ORDER — DEXTROSE MONOHYDRATE 25 G/50ML
25-50 INJECTION, SOLUTION INTRAVENOUS
Status: DISCONTINUED | OUTPATIENT
Start: 2020-03-11 | End: 2020-03-11 | Stop reason: HOSPADM

## 2020-03-11 RX ORDER — FENTANYL CITRATE 50 UG/ML
25-50 INJECTION, SOLUTION INTRAMUSCULAR; INTRAVENOUS EVERY 5 MIN PRN
Status: DISCONTINUED | OUTPATIENT
Start: 2020-03-11 | End: 2020-03-11 | Stop reason: HOSPADM

## 2020-03-11 RX ADMIN — FENTANYL CITRATE 50 MCG: 50 INJECTION INTRAMUSCULAR; INTRAVENOUS at 10:13

## 2020-03-11 RX ADMIN — FENTANYL CITRATE 25 MCG: 50 INJECTION INTRAMUSCULAR; INTRAVENOUS at 10:25

## 2020-03-11 RX ADMIN — MIDAZOLAM 0.5 MG: 1 INJECTION INTRAMUSCULAR; INTRAVENOUS at 10:18

## 2020-03-11 RX ADMIN — FENTANYL CITRATE 25 MCG: 50 INJECTION INTRAMUSCULAR; INTRAVENOUS at 10:18

## 2020-03-11 RX ADMIN — SODIUM CHLORIDE: 9 INJECTION, SOLUTION INTRAVENOUS at 08:30

## 2020-03-11 RX ADMIN — LIDOCAINE HYDROCHLORIDE 10 ML: 10 INJECTION, SOLUTION EPIDURAL; INFILTRATION; INTRACAUDAL; PERINEURAL at 10:15

## 2020-03-11 RX ADMIN — MIDAZOLAM 1 MG: 1 INJECTION INTRAMUSCULAR; INTRAVENOUS at 10:14

## 2020-03-11 RX ADMIN — MIDAZOLAM 0.5 MG: 1 INJECTION INTRAMUSCULAR; INTRAVENOUS at 10:25

## 2020-03-11 RX ADMIN — MIDAZOLAM 0.5 MG: 1 INJECTION INTRAMUSCULAR; INTRAVENOUS at 10:21

## 2020-03-11 RX ADMIN — FENTANYL CITRATE 25 MCG: 50 INJECTION INTRAMUSCULAR; INTRAVENOUS at 10:21

## 2020-03-11 NOTE — PROGRESS NOTES
Patient arrived on 2A for liver biopsy.  Recent labs reviewed from 3/3.  IV placed.  Consent done.  Prep complete.

## 2020-03-11 NOTE — DISCHARGE INSTRUCTIONS
[facility]    Interventional Radiology  Patient Instructions Following Liver Biopsy    AFTER YOU GO HOME  ? If you were given sedation DO NOT drive or operate machinery at home or at work for at least 24 hours  ? DO relax and take it easy for 48 hours, no strenuous activity for 24 hours  ? DO drink plenty of fluids  ? DO resume your regular diet, unless otherwise instructed by your Primary Physician  ? Keep the dressing dry and in place for 24 hours.  ? DO NOT SMOKE FOR AT LEAST 24 HOURS, if you have been given any medications that were to help you relax or sedate you during your procedure  ? DO NOT drink alcoholic beverages the day of your procedure  ? DO NOT do any strenuous exercise or lifting (> 10 lbs) for at least 7 days following your procedure  ? DO NOT take a bath or shower for at least 12 hours following your procedure  ? Remove dressing after shower the next day. Replace with Band aid for 2 days.  Never leave a wet dressing in place.  ? DO NOT make any important or legal decisions for 24 hours following your procedure  ? There should be minimum drainage from the biopsy site    CALL THE PHYSICIAN IF:  ? You start bleeding from the procedure site.  If you do start to bleed from that site, lie down flat and hold pressure on the site for a minimum of 10 minutes.  Your physician will tell you if you need to return to the hospital  ? You develop nausea or vomiting  ? You have excessive swelling, redness, or tenderness at the site  ? You have drainage that looks like it is infected.  ? You experience severe pain  ? You develop hives or a rash or unexplained itching  ? You develop shortness of breath  ? You develop a temperature of 101 degrees F or greater      [facility] INTERVENTIONAL RADIOLOGY DEPARTMENT  Procedure Physician:  Dr. Zimmer                                Date of procedure: March 11, 2020  Telephone Numbers: [phone] Monday-Friday 8:00 am to 4:30 pm  [phone] After 4:30 pm  Monday-Friday, Weekends & Holidays.   Ask for the Interventional Radiologist on call.  Someone is on call 24 hrs/day  [facility] toll free number: [phone] Monday-Friday 8:00 am to 4:30 pm  [facility] Emergency Dept: [phone]

## 2020-03-11 NOTE — PROCEDURES
[facility], [place]    Procedure: IR Procedure Note    Date/Time: 3/11/2020 10:44 AM  Performed by: Mello Zimmer MD  Authorized by: Mello Zimmer MD   IR Fellow Physician:  Other(s) attending procedure: Maya Sullivan IR RPA    UNIVERSAL PROTOCOL   Site Marked: NA  Prior Images Obtained and Reviewed:  Yes  Required items: Required blood products, implants, devices and special equipment available    Patient identity confirmed:  Verbally with patient, arm band, provided demographic data and hospital-assigned identification number  Patient was reevaluated immediately before administering moderate or deep sedation or anesthesia  Confirmation Checklist:  Patient's identity using two indicators, relevant allergies, procedure was appropriate and matched the consent or emergent situation and correct equipment/implants were available  Time out: Immediately prior to the procedure a time out was called    Universal Protocol: the Joint Commission Universal Protocol was followed    Preparation: Patient was prepped and draped in usual sterile fashion           ANESTHESIA    Anesthesia: Local infiltration  Local Anesthetic:  Lidocaine 1% without epinephrine      SEDATION    Patient Sedated: Yes    Sedation:  Diazepam and fentanyl  Vital signs: Vital signs monitored during sedation    See dictated procedure note for full details.  Findings: Liver lesion segment 7/8    Specimens: core needle biopsy specimens sent for pathological analysis    Complications: None    Condition: Stable    Plan: Follow up with primary provider for results    PROCEDURE   Patient Tolerance:  Patient tolerated the procedure well with no immediate complications  Describe Procedure: US guided liver lesion biopsies preformed. 4 cores  placed in formalin.   Length of time physician/provider present for 1:1 monitoring during sedation: 25

## 2020-03-11 NOTE — PRE-PROCEDURE
GENERAL PRE-PROCEDURE:   Procedure:  Liver lesion biopsy     Verbal consent obtained?: Yes    Written consent obtained?: Yes    Risks and benefits: Risks, benefits and alternatives were discussed    Consent given by:  Patient  Patient states understanding of procedure being performed: Yes    Patient's understanding of procedure matches consent: Yes    Procedure consent matches procedure scheduled: Yes    Expected level of sedation:  Moderate  Appropriately NPO:  Yes  ASA Class:  Class 2- mild systemic disease, no acute problems, no functional limitations  Mallampati  :  Grade 2- soft palate, base of uvula, tonsillar pillars, and portion of posterior pharyngeal wall visible  Lungs:  Lungs clear with good breath sounds bilaterally  Heart:  Normal heart sounds and rate  History & Physical reviewed:  History and physical reviewed and no updates needed  Statement of review:  I have reviewed the lab findings, diagnostic data, medications, and the plan for sedation

## 2020-03-11 NOTE — PROGRESS NOTES
Patient tolerated recovery stage well. VSS, RUQ abdomen site clean/dry/intact, no hematoma, and denies pain. Patient tolerated PO food and fluids. Teaching was done and discharge instructions were given. Patient ambulated, voided, and PIV was removed. Patient discharged from the hospital via wheel chair to home with wife @ 1255.

## 2020-03-11 NOTE — IP AVS SNAPSHOT
MRN:[id number]                      After Visit Summary   3/11/2020    Dima Olivas    MRN: [id number]           Visit Information        Department      3/11/2020  7:40 AM [address] [facility]          Review of your medicines      UNREVIEWED medicines. Ask your doctor about these medicines       Dose / Directions   vitamin B1 250 MG tablet  Commonly known as:  THIAMINE      Dose:  250 mg  Take 250 mg by mouth daily  Refills:  0              Protect others around you: Learn how to safely use, store and throw away your medicines at www.disposemymeds.org.       Follow-ups after your visit       Your next 10 appointments already scheduled    Mar 26, 2020  Procedure with Salvador Reyes MD  [facility], [place], Endoscopy ([facility], [facility]) [address] [id number] [phone]   [facility] is located on the corner of [address] on the [address] [facility]. It is easily accessible from virtually any point in the [place] area, via [address] and [address].   Jun 17, 2020 10:00 AM CDT  Lab with  LAB  [facility] ([facility]) [address] [id number] [phone]      Jun 17, 2020 11:00 AM CDT  (Arrive by 10:45 AM)  Return General Liver with Salvador Reyes MD  [facility] Hepatology ([facility]) [address] [place] [id number] [phone]         Care Instructions       Further instructions from your care team       [facility]    Interventional Radiology  Patient Instructions Following Liver Biopsy    AFTER YOU GO HOME  ? If you were given sedation DO NOT drive or operate machinery at home or at work for at least 24 hours  ? DO relax and take it easy for 48 hours, no strenuous activity for 24 hours  ? DO drink plenty of fluids  ? DO resume your regular diet,  unless otherwise instructed by your Primary Physician  ? Keep the dressing dry and in place for 24 hours.  ? DO NOT SMOKE FOR AT LEAST 24 HOURS, if you have been given any medications that were to help you relax or sedate you during your procedure  ? DO NOT drink alcoholic beverages the day of your procedure  ? DO NOT do any strenuous exercise or lifting (> 10 lbs) for at least 7 days following your procedure  ? DO NOT take a bath or shower for at least 12 hours following your procedure  ? Remove dressing after shower the next day. Replace with Band aid for 2 days.  Never leave a wet dressing in place.  ? DO NOT make any important or legal decisions for 24 hours following your procedure  ? There should be minimum drainage from the biopsy site    CALL THE PHYSICIAN IF:  ? You start bleeding from the procedure site.  If you do start to bleed from that site, lie down flat and hold pressure on the site for a minimum of 10 minutes.  Your physician will tell you if you need to return to the hospital  ? You develop nausea or vomiting  ? You have excessive swelling, redness, or tenderness at the site  ? You have drainage that looks like it is infected.  ? You experience severe pain  ? You develop hives or a rash or unexplained itching  ? You develop shortness of breath  ? You develop a temperature of 101 degrees F or greater      [facility] INTERVENTIONAL RADIOLOGY DEPARTMENT  Procedure Physician:  Dr. Zimmer                                Date of procedure: March 11, 2020  Telephone Numbers: [phone] Monday-Friday 8:00 am to 4:30 pm  [phone] After 4:30 pm Monday-Friday, Weekends & Holidays.   Ask for the Interventional Radiologist on call.  Someone is on call 24 hrs/day  [facility] toll free number: [phone] Monday-Friday 8:00 am to 4:30 pm  [facility] Emergency Dept: [phone]          Additional Information About Your Visit       [employer] Information    [employer] lets you send messages to your doctor, view your test results,  "renew your prescriptions, schedule appointments and more. To sign up, go to [email or web address] . Click on \"Log in\" on the left side of the screen, which will take you to the Welcome page. Then click on \"Sign up Now\" on the right side of the page.     You will be asked to enter the access code listed below, as well as some personal information. Please follow the directions to create your username and password.     Your access code is: [email or web address]  Expires: 2020 11:45 AM     Your access code will  in 60 days. If you need help or a new code, please call your   [facility] clinic or [phone].       Care EveryWhere ID    This is your Care EveryWhere ID. This could be used by other organizations to access your [facility] medical records  [id number]       Your Vitals Were  Most recent update: 3/11/2020 11:46 AM    Blood Pressure   139/79 (BP Location: Left arm, Cuff Size: Adult Regular)    Pulse   82    Temperature   98.5  F (36.9  C) (Oral)    Respirations   16    Pulse Oximetry   100%          Primary Care Provider Office Phone # Fax #    Tracy Feliz [phone]      Equal Access to Services    HEATHER ASKEW : Hadii aaron ku hadasho Soomaali, waaxda luqadaha, qaybta kaalmada adeegyada, thierry pineda. So [facility] [phone].    ATENCIÓN: Si habla español, tiene a navarrete disposición servicios gratuitos de asistencia lingüística. Shara al [phone].    We comply with applicable federal and state civil rights laws, including the [place] Human Rights Act. We do not discriminate on the basis of race, color, creed, Jehovah's witness, national origin, marital status, age, disability, sex, sexual orientation, or gender identity.       Thank you!    Thank you for choosing [facility] for your care. Our goal is always to provide you with excellent care. Hearing back from our patients is one way we can continue to improve our services. Please take a few minutes to " complete the written survey that you may receive in the mail after you visit with us. Thank you!            Medication List      ASK your doctor about these medications          Morning Afternoon Evening Bedtime As Needed    vitamin B1 250 MG tablet  Also known as:  THIAMINE  INSTRUCTIONS:  Take 250 mg by mouth daily

## 2020-03-11 NOTE — PROGRESS NOTES
Patient Name: Dima Olivas  Medical Record Number: [id number]  Today's Date: 3/11/2020    Procedure: Image guided liver lesion biopsy  Proceduralist: Dr. Zimmer, Uziel Mejia PA-C, Maya Sullivan RPA    Procedure Start: 1013  Procedure end: 1037  Sedation medications administered: 2.5mg versed, 125mcg fentanyl     Report given to: Earnestine HERRERA 2A  : ANGELICA    Other Notes: Pt arrived to IR room 6 from . Consent reviewed. Pt denies any questions or concerns regarding procedure. Pt positioned supine and monitored per protocol. Pt tolerated procedure without any noted complications. Pathology called at beginning of case for support but returned phone call saying they would not come because of the size and type of lesion. 4 Cores put into formalin as directed by them. Pt transferred back to 2A.

## 2020-03-11 NOTE — PROGRESS NOTES
Patient Education    [facility] PARENT  15 THROUGH 17 YEAR VISITS  Here are some suggestions from [employer] experts that may be of value to your family.     HOW YOUR FAMILY IS DOING  Set aside time to be with your teen and really listen to her hopes and concerns.  Support your teen in finding activities that interest him. Encourage your teen to help others in the community.  Help your teen find and be a part of positive after-school activities and sports.  Support your teen as she figures out ways to deal with stress, solve problems, and make decisions.  Help your teen deal with conflict.  If you are worried about your living or food situation, talk with us. Community agencies and programs such as SNAP can also provide information.    YOUR GROWING AND CHANGING TEEN  Make sure your teen visits the dentist at least twice a year.  Give your teen a fluoride supplement if the dentist recommends it.  Support your teen s healthy body weight and help him be a healthy eater.  Provide healthy foods.  Eat together as a family.  Be a role model.  Help your teen get enough calcium with low-fat or fat-free milk, low-fat yogurt, and cheese.  Encourage at least 1 hour of physical activity a day.  Praise your teen when she does something well, not just when she looks good.    YOUR TEEN S FEELINGS  If you are concerned that your teen is sad, depressed, nervous, irritable, hopeless, or angry, let us know.  If you have questions about your teen s sexual development, you can always talk with us.    HEALTHY BEHAVIOR CHOICES  Know your teen s friends and their parents. Be aware of where your teen is and what he is doing at all times.  Talk with your teen about your values and your expectations on drinking, drug use, tobacco use, driving, and sex.  Praise your teen for healthy decisions about sex, tobacco, alcohol, and other drugs.  Be a role model.  Know your teen s friends and their activities together.  Lock your  Patient returned to 2A post liver biopsy.  VSS.  Sleepy, but arousable.  Requiring 3L nasal cannula for O2 sats >92% - will wean as tolerated.  RUQ abdomen site C/D/I, no hematoma, denies pain.  Water at bedside, will offer food when more wakeful.  Wife at bedside.   liquor in a cabinet.  Store prescription medications in a locked cabinet.  Be there for your teen when she needs support or help in making healthy decisions about her behavior.    SAFETY  Encourage safe and responsible driving habits.  Lap and shoulder seat belts should be used by everyone.  Limit the number of friends in the car and ask your teen to avoid driving at night.  Discuss with your teen how to avoid risky situations, who to call if your teen feels unsafe, and what you expect of your teen as a .  Do not tolerate drinking and driving.  If it is necessary to keep a gun in your home, store it unloaded and locked with the ammunition locked separately from the gun.      Consistent with Bright Futures: Guidelines for Health Supervision of Infants, Children, and Adolescents, 4th Edition  For more information, go to https://brightfutures.aap.org.

## 2020-03-11 NOTE — IP AVS SNAPSHOT
[address] [id number]                                    After Visit Summary   3/11/2020    Dima Olivas    MRN: [id number]           After Visit Summary Signature Page    I have received my discharge instructions, and my questions have been answered. I have discussed any challenges I see with this plan with the nurse or doctor.    ..........................................................................................................................................  Patient/Patient Representative Signature      ..........................................................................................................................................  Patient Representative Print Name and Relationship to Patient    ..................................................               ................................................  Date                                   Time    ..........................................................................................................................................  Reviewed by Signature/Title    ...................................................              ..............................................  Date                                               Time          22EPIC Rev 08/18

## 2020-03-13 LAB — COPATH REPORT: NORMAL

## 2020-03-16 ENCOUNTER — DOCUMENTATION ONLY (OUTPATIENT)
Dept: TRANSPLANT | Facility: CLINIC | Age: 60
End: 2020-03-16

## 2020-03-16 ENCOUNTER — TELEPHONE (OUTPATIENT)
Dept: GASTROENTEROLOGY | Facility: CLINIC | Age: 60
End: 2020-03-16

## 2020-03-16 DIAGNOSIS — K74.60 CIRRHOSIS OF LIVER (H): Primary | ICD-10-CM

## 2020-03-16 NOTE — PROGRESS NOTES
Per Dr. Parish liver lesion bx reviewed- no evidence of HCC, hold on tx evaluation for now, repeat imaging in 3 months, follow-up in clinic as scheduled.     Evaluation closed. Order placed for repeat imaging per Dr. Parish. Pt updated of above. Importance of ongoing follow up including imaging reviewed. Scheduling number provided for pt to call and schedule MRI. Pt verbalized understanding and denied questions or concerns at this time.

## 2020-05-18 ENCOUNTER — DOCUMENTATION ONLY (OUTPATIENT)
Dept: CARE COORDINATION | Facility: CLINIC | Age: 60
End: 2020-05-18

## 2020-06-12 DIAGNOSIS — K74.69 COMPENSATED HCV CIRRHOSIS (H): Primary | ICD-10-CM

## 2020-06-12 DIAGNOSIS — B19.20 COMPENSATED HCV CIRRHOSIS (H): Primary | ICD-10-CM

## 2020-06-15 ENCOUNTER — TELEPHONE (OUTPATIENT)
Dept: TRANSPLANT | Facility: CLINIC | Age: 60
End: 2020-06-15

## 2020-06-15 NOTE — TELEPHONE ENCOUNTER
Left a voicemail for patient to convert their in person visit with Dr. Parish on 6/17/20 to a video visit.

## 2020-06-17 ENCOUNTER — APPOINTMENT (OUTPATIENT)
Dept: GASTROENTEROLOGY | Facility: CLINIC | Age: 60
End: 2020-06-17
Attending: INTERNAL MEDICINE
Payer: COMMERCIAL

## 2021-01-01 ENCOUNTER — INFUSION THERAPY VISIT (OUTPATIENT)
Dept: ONCOLOGY | Facility: CLINIC | Age: 61
End: 2021-01-01
Attending: INTERNAL MEDICINE
Payer: COMMERCIAL

## 2021-01-01 ENCOUNTER — ANCILLARY PROCEDURE (OUTPATIENT)
Dept: GENERAL RADIOLOGY | Facility: CLINIC | Age: 61
End: 2021-01-01
Attending: PHYSICIAN ASSISTANT
Payer: COMMERCIAL

## 2021-01-01 ENCOUNTER — APPOINTMENT (OUTPATIENT)
Dept: GENERAL RADIOLOGY | Facility: CLINIC | Age: 61
End: 2021-01-01
Payer: COMMERCIAL

## 2021-01-01 ENCOUNTER — APPOINTMENT (OUTPATIENT)
Dept: CT IMAGING | Facility: CLINIC | Age: 61
End: 2021-01-01
Payer: COMMERCIAL

## 2021-01-01 ENCOUNTER — PATIENT OUTREACH (OUTPATIENT)
Dept: ONCOLOGY | Facility: CLINIC | Age: 61
End: 2021-01-01

## 2021-01-01 ENCOUNTER — TELEPHONE (OUTPATIENT)
Dept: GASTROENTEROLOGY | Facility: CLINIC | Age: 61
End: 2021-01-01

## 2021-01-01 ENCOUNTER — APPOINTMENT (OUTPATIENT)
Dept: LAB | Facility: CLINIC | Age: 61
End: 2021-01-01
Attending: INTERNAL MEDICINE
Payer: COMMERCIAL

## 2021-01-01 ENCOUNTER — APPOINTMENT (OUTPATIENT)
Dept: ULTRASOUND IMAGING | Facility: CLINIC | Age: 61
End: 2021-01-01
Attending: EMERGENCY MEDICINE
Payer: COMMERCIAL

## 2021-01-01 ENCOUNTER — HEALTH MAINTENANCE LETTER (OUTPATIENT)
Age: 61
End: 2021-01-01

## 2021-01-01 ENCOUNTER — TELEPHONE (OUTPATIENT)
Dept: ONCOLOGY | Facility: CLINIC | Age: 61
End: 2021-01-01

## 2021-01-01 ENCOUNTER — HOSPITAL ENCOUNTER (INPATIENT)
Facility: CLINIC | Age: 61
LOS: 8 days | Discharge: HOSPICE | End: 2021-11-10
Attending: EMERGENCY MEDICINE | Admitting: INTERNAL MEDICINE
Payer: COMMERCIAL

## 2021-01-01 ENCOUNTER — LAB (OUTPATIENT)
Dept: LAB | Facility: CLINIC | Age: 61
End: 2021-01-01
Payer: COMMERCIAL

## 2021-01-01 ENCOUNTER — ANCILLARY PROCEDURE (OUTPATIENT)
Dept: MRI IMAGING | Facility: CLINIC | Age: 61
End: 2021-01-01
Attending: FAMILY MEDICINE
Payer: COMMERCIAL

## 2021-01-01 ENCOUNTER — HOSPITAL ENCOUNTER (OUTPATIENT)
Dept: CT IMAGING | Facility: CLINIC | Age: 61
End: 2021-10-13
Attending: INTERNAL MEDICINE
Payer: COMMERCIAL

## 2021-01-01 ENCOUNTER — APPOINTMENT (OUTPATIENT)
Dept: CT IMAGING | Facility: CLINIC | Age: 61
End: 2021-01-01
Attending: NURSE PRACTITIONER
Payer: COMMERCIAL

## 2021-01-01 ENCOUNTER — VIRTUAL VISIT (OUTPATIENT)
Dept: GASTROENTEROLOGY | Facility: CLINIC | Age: 61
End: 2021-01-01
Attending: PHYSICIAN ASSISTANT
Payer: COMMERCIAL

## 2021-01-01 ENCOUNTER — OFFICE VISIT (OUTPATIENT)
Dept: FAMILY MEDICINE | Facility: CLINIC | Age: 61
End: 2021-01-01
Payer: COMMERCIAL

## 2021-01-01 ENCOUNTER — VIRTUAL VISIT (OUTPATIENT)
Dept: ONCOLOGY | Facility: CLINIC | Age: 61
End: 2021-01-01
Attending: PHYSICIAN ASSISTANT
Payer: COMMERCIAL

## 2021-01-01 ENCOUNTER — HOSPITAL ENCOUNTER (OUTPATIENT)
Facility: AMBULATORY SURGERY CENTER | Age: 61
Discharge: HOME | End: 2021-09-24
Attending: INTERNAL MEDICINE | Admitting: INTERNAL MEDICINE
Payer: COMMERCIAL

## 2021-01-01 ENCOUNTER — OFFICE VISIT (OUTPATIENT)
Dept: URGENT CARE | Facility: URGENT CARE | Age: 61
End: 2021-01-01
Payer: COMMERCIAL

## 2021-01-01 ENCOUNTER — HOSPITAL ENCOUNTER (OUTPATIENT)
Dept: MRI IMAGING | Facility: CLINIC | Age: 61
End: 2021-10-13
Attending: INTERNAL MEDICINE
Payer: COMMERCIAL

## 2021-01-01 VITALS
OXYGEN SATURATION: 99 % | WEIGHT: 206.8 LBS | HEART RATE: 110 BPM | RESPIRATION RATE: 16 BRPM | BODY MASS INDEX: 29.67 KG/M2 | DIASTOLIC BLOOD PRESSURE: 72 MMHG | SYSTOLIC BLOOD PRESSURE: 170 MMHG

## 2021-01-01 VITALS
BODY MASS INDEX: 25.77 KG/M2 | HEIGHT: 70 IN | OXYGEN SATURATION: 96 % | HEART RATE: 70 BPM | DIASTOLIC BLOOD PRESSURE: 68 MMHG | SYSTOLIC BLOOD PRESSURE: 127 MMHG | RESPIRATION RATE: 18 BRPM | WEIGHT: 180 LBS | TEMPERATURE: 98 F

## 2021-01-01 VITALS
OXYGEN SATURATION: 100 % | TEMPERATURE: 98.7 F | DIASTOLIC BLOOD PRESSURE: 74 MMHG | SYSTOLIC BLOOD PRESSURE: 170 MMHG | HEART RATE: 85 BPM | RESPIRATION RATE: 16 BRPM

## 2021-01-01 VITALS
RESPIRATION RATE: 18 BRPM | DIASTOLIC BLOOD PRESSURE: 87 MMHG | SYSTOLIC BLOOD PRESSURE: 162 MMHG | WEIGHT: 183 LBS | HEART RATE: 94 BPM | TEMPERATURE: 98.1 F | OXYGEN SATURATION: 100 % | BODY MASS INDEX: 26.26 KG/M2

## 2021-01-01 VITALS
BODY MASS INDEX: 29.1 KG/M2 | HEIGHT: 70 IN | DIASTOLIC BLOOD PRESSURE: 75 MMHG | WEIGHT: 203.26 LBS | SYSTOLIC BLOOD PRESSURE: 145 MMHG | TEMPERATURE: 97.8 F | RESPIRATION RATE: 16 BRPM | OXYGEN SATURATION: 95 % | HEART RATE: 81 BPM

## 2021-01-01 DIAGNOSIS — Z11.52 ENCOUNTER FOR SCREENING LABORATORY TESTING FOR SEVERE ACUTE RESPIRATORY SYNDROME CORONAVIRUS 2 (SARS-COV-2): ICD-10-CM

## 2021-01-01 DIAGNOSIS — S20.211A CONTUSION OF RIB ON RIGHT SIDE, INITIAL ENCOUNTER: Primary | ICD-10-CM

## 2021-01-01 DIAGNOSIS — R60.0 PERIPHERAL EDEMA: ICD-10-CM

## 2021-01-01 DIAGNOSIS — C22.0 HCC (HEPATOCELLULAR CARCINOMA) (H): Primary | ICD-10-CM

## 2021-01-01 DIAGNOSIS — C22.0 HCC (HEPATOCELLULAR CARCINOMA) (H): ICD-10-CM

## 2021-01-01 DIAGNOSIS — K76.9 LIVER LESION: ICD-10-CM

## 2021-01-01 DIAGNOSIS — Z11.59 ENCOUNTER FOR SCREENING FOR OTHER VIRAL DISEASES: ICD-10-CM

## 2021-01-01 DIAGNOSIS — K85.10 ACUTE BILIARY PANCREATITIS, UNSPECIFIED COMPLICATION STATUS: ICD-10-CM

## 2021-01-01 DIAGNOSIS — K74.69 COMPENSATED HCV CIRRHOSIS (H): Primary | ICD-10-CM

## 2021-01-01 DIAGNOSIS — K74.60 CIRRHOSIS OF LIVER WITHOUT ASCITES, UNSPECIFIED HEPATIC CIRRHOSIS TYPE (H): ICD-10-CM

## 2021-01-01 DIAGNOSIS — K72.00 ACUTE LIVER FAILURE WITHOUT HEPATIC COMA: ICD-10-CM

## 2021-01-01 DIAGNOSIS — K76.9 LIVER LESION: Primary | ICD-10-CM

## 2021-01-01 DIAGNOSIS — B19.20 HEPATITIS C VIRUS INFECTION WITHOUT HEPATIC COMA, UNSPECIFIED CHRONICITY: ICD-10-CM

## 2021-01-01 DIAGNOSIS — R07.81 RIB TENDERNESS: ICD-10-CM

## 2021-01-01 DIAGNOSIS — B19.20 COMPENSATED HCV CIRRHOSIS (H): ICD-10-CM

## 2021-01-01 DIAGNOSIS — K21.00 GASTROESOPHAGEAL REFLUX DISEASE WITH ESOPHAGITIS WITHOUT HEMORRHAGE: Primary | ICD-10-CM

## 2021-01-01 DIAGNOSIS — R03.0 ELEVATED BLOOD PRESSURE READING WITHOUT DIAGNOSIS OF HYPERTENSION: ICD-10-CM

## 2021-01-01 DIAGNOSIS — B19.20 COMPENSATED HCV CIRRHOSIS (H): Primary | ICD-10-CM

## 2021-01-01 DIAGNOSIS — R30.0 DYSURIA: ICD-10-CM

## 2021-01-01 DIAGNOSIS — K74.69 COMPENSATED HCV CIRRHOSIS (H): ICD-10-CM

## 2021-01-01 DIAGNOSIS — R25.2 CRAMP OF LIMB: ICD-10-CM

## 2021-01-01 DIAGNOSIS — N17.9 ACUTE KIDNEY INJURY (H): ICD-10-CM

## 2021-01-01 DIAGNOSIS — C22.0 HEPATOCELLULAR CARCINOMA (H): ICD-10-CM

## 2021-01-01 DIAGNOSIS — R18.8 OTHER ASCITES: ICD-10-CM

## 2021-01-01 LAB
ABO/RH(D): NORMAL
ABO/RH(D): NORMAL
AFP SERPL-MCNC: 10.5 UG/L (ref 0–8)
AFP SERPL-MCNC: 27.4 UG/L (ref 0–8)
AFP SERPL-MCNC: 39.4 UG/L (ref 0–8)
ALBUMIN FLD-MCNC: 0.2 G/DL
ALBUMIN SERPL-MCNC: 1.5 G/DL (ref 3.4–5)
ALBUMIN SERPL-MCNC: 1.8 G/DL (ref 3.4–5)
ALBUMIN SERPL-MCNC: 2 G/DL (ref 3.4–5)
ALBUMIN SERPL-MCNC: 2.2 G/DL (ref 3.4–5)
ALBUMIN SERPL-MCNC: 2.3 G/DL (ref 3.4–5)
ALBUMIN SERPL-MCNC: 2.6 G/DL (ref 3.4–5)
ALBUMIN SERPL-MCNC: 2.7 G/DL (ref 3.4–5)
ALBUMIN SERPL-MCNC: 2.8 G/DL (ref 3.4–5)
ALBUMIN SERPL-MCNC: 2.9 G/DL (ref 3.4–5)
ALBUMIN SERPL-MCNC: 3 G/DL (ref 3.4–5)
ALBUMIN SERPL-MCNC: 3 G/DL (ref 3.4–5)
ALBUMIN SERPL-MCNC: 3.4 G/DL (ref 3.4–5)
ALBUMIN UR-MCNC: 10 MG/DL
ALBUMIN UR-MCNC: ABNORMAL MG/DL
ALP SERPL-CCNC: 100 U/L (ref 40–150)
ALP SERPL-CCNC: 100 U/L (ref 40–150)
ALP SERPL-CCNC: 102 U/L (ref 40–150)
ALP SERPL-CCNC: 110 U/L (ref 40–150)
ALP SERPL-CCNC: 110 U/L (ref 40–150)
ALP SERPL-CCNC: 112 U/L (ref 40–150)
ALP SERPL-CCNC: 114 U/L (ref 40–150)
ALP SERPL-CCNC: 127 U/L (ref 40–150)
ALP SERPL-CCNC: 135 U/L (ref 40–150)
ALP SERPL-CCNC: 149 U/L (ref 40–150)
ALP SERPL-CCNC: 163 U/L (ref 40–150)
ALP SERPL-CCNC: 183 U/L (ref 40–150)
ALP SERPL-CCNC: 88 U/L (ref 40–150)
ALP SERPL-CCNC: 93 U/L (ref 40–150)
ALP SERPL-CCNC: 93 U/L (ref 40–150)
ALP SERPL-CCNC: 98 U/L (ref 40–150)
ALT SERPL W P-5'-P-CCNC: 100 U/L (ref 0–70)
ALT SERPL W P-5'-P-CCNC: 113 U/L (ref 0–70)
ALT SERPL W P-5'-P-CCNC: 182 U/L (ref 0–70)
ALT SERPL W P-5'-P-CCNC: 52 U/L (ref 0–70)
ALT SERPL W P-5'-P-CCNC: 57 U/L (ref 0–70)
ALT SERPL W P-5'-P-CCNC: 59 U/L (ref 0–70)
ALT SERPL W P-5'-P-CCNC: 60 U/L (ref 0–70)
ALT SERPL W P-5'-P-CCNC: 64 U/L (ref 0–70)
ALT SERPL W P-5'-P-CCNC: 66 U/L (ref 0–70)
ALT SERPL W P-5'-P-CCNC: 68 U/L (ref 0–70)
ALT SERPL W P-5'-P-CCNC: 74 U/L (ref 0–70)
ALT SERPL W P-5'-P-CCNC: 75 U/L (ref 0–70)
ALT SERPL W P-5'-P-CCNC: 75 U/L (ref 0–70)
ALT SERPL W P-5'-P-CCNC: 76 U/L (ref 0–70)
ALT SERPL W P-5'-P-CCNC: 85 U/L (ref 0–70)
ALT SERPL W P-5'-P-CCNC: 86 U/L (ref 0–70)
AMMONIA PLAS-SCNC: 21 UMOL/L (ref 10–50)
ANION GAP SERPL CALCULATED.3IONS-SCNC: 10 MMOL/L (ref 3–14)
ANION GAP SERPL CALCULATED.3IONS-SCNC: 12 MMOL/L (ref 3–14)
ANION GAP SERPL CALCULATED.3IONS-SCNC: 13 MMOL/L (ref 3–14)
ANION GAP SERPL CALCULATED.3IONS-SCNC: 14 MMOL/L (ref 3–14)
ANION GAP SERPL CALCULATED.3IONS-SCNC: 15 MMOL/L (ref 3–14)
ANION GAP SERPL CALCULATED.3IONS-SCNC: 15 MMOL/L (ref 3–14)
ANION GAP SERPL CALCULATED.3IONS-SCNC: 3 MMOL/L (ref 3–14)
ANION GAP SERPL CALCULATED.3IONS-SCNC: 5 MMOL/L (ref 3–14)
ANION GAP SERPL CALCULATED.3IONS-SCNC: 5 MMOL/L (ref 3–14)
ANION GAP SERPL CALCULATED.3IONS-SCNC: 6 MMOL/L (ref 3–14)
ANTIBODY SCREEN: NEGATIVE
APPEARANCE FLD: ABNORMAL
APPEARANCE UR: CLEAR
AST SERPL W P-5'-P-CCNC: 146 U/L (ref 0–45)
AST SERPL W P-5'-P-CCNC: 150 U/L (ref 0–45)
AST SERPL W P-5'-P-CCNC: 171 U/L (ref 0–45)
AST SERPL W P-5'-P-CCNC: 174 U/L (ref 0–45)
AST SERPL W P-5'-P-CCNC: 182 U/L (ref 0–45)
AST SERPL W P-5'-P-CCNC: 58 U/L (ref 0–45)
AST SERPL W P-5'-P-CCNC: 65 U/L (ref 0–45)
AST SERPL W P-5'-P-CCNC: 66 U/L (ref 0–45)
AST SERPL W P-5'-P-CCNC: 68 U/L (ref 0–45)
AST SERPL W P-5'-P-CCNC: 69 U/L (ref 0–45)
AST SERPL W P-5'-P-CCNC: 70 U/L (ref 0–45)
AST SERPL W P-5'-P-CCNC: 72 U/L (ref 0–45)
AST SERPL W P-5'-P-CCNC: 74 U/L (ref 0–45)
AST SERPL W P-5'-P-CCNC: 78 U/L (ref 0–45)
AST SERPL W P-5'-P-CCNC: 83 U/L (ref 0–45)
AST SERPL W P-5'-P-CCNC: 94 U/L (ref 0–45)
ATRIAL RATE - MUSE: 78 BPM
BACTERIA #/AREA URNS HPF: ABNORMAL /HPF
BACTERIA BLD CULT: NO GROWTH
BACTERIA BLD CULT: NO GROWTH
BACTERIA FLD CULT: NO GROWTH
BACTERIA FLD CULT: NORMAL
BASE EXCESS BLDV CALC-SCNC: -3.7 MMOL/L (ref -7.7–1.9)
BASOPHILS # BLD AUTO: 0 10E3/UL (ref 0–0.2)
BASOPHILS # BLD AUTO: 0 10E3/UL (ref 0–0.2)
BASOPHILS # BLD AUTO: 0.1 10E3/UL (ref 0–0.2)
BASOPHILS # BLD AUTO: 0.1 10E3/UL (ref 0–0.2)
BASOPHILS NFR BLD AUTO: 0 %
BASOPHILS NFR BLD AUTO: 0 %
BASOPHILS NFR BLD AUTO: 2 %
BASOPHILS NFR BLD AUTO: 2 %
BILIRUB DIRECT SERPL-MCNC: 20.2 MG/DL (ref 0–0.2)
BILIRUB SERPL-MCNC: 23.8 MG/DL (ref 0.2–1.3)
BILIRUB SERPL-MCNC: 24.1 MG/DL (ref 0.2–1.3)
BILIRUB SERPL-MCNC: 24.4 MG/DL (ref 0.2–1.3)
BILIRUB SERPL-MCNC: 24.4 MG/DL (ref 0.2–1.3)
BILIRUB SERPL-MCNC: 24.7 MG/DL (ref 0.2–1.3)
BILIRUB SERPL-MCNC: 25.3 MG/DL (ref 0.2–1.3)
BILIRUB SERPL-MCNC: 25.3 MG/DL (ref 0.2–1.3)
BILIRUB SERPL-MCNC: 25.6 MG/DL (ref 0.2–1.3)
BILIRUB SERPL-MCNC: 25.7 MG/DL (ref 0.2–1.3)
BILIRUB SERPL-MCNC: 25.7 MG/DL (ref 0.2–1.3)
BILIRUB SERPL-MCNC: 26.1 MG/DL (ref 0.2–1.3)
BILIRUB SERPL-MCNC: 26.7 MG/DL (ref 0.2–1.3)
BILIRUB SERPL-MCNC: 3.1 MG/DL (ref 0.2–1.3)
BILIRUB SERPL-MCNC: 3.3 MG/DL (ref 0.2–1.3)
BILIRUB SERPL-MCNC: 4.7 MG/DL (ref 0.2–1.3)
BILIRUB SERPL-MCNC: 5.3 MG/DL (ref 0.2–1.3)
BILIRUB UR QL STRIP: ABNORMAL
BLD PROD TYP BPU: NORMAL
BLOOD COMPONENT TYPE: NORMAL
BUN SERPL-MCNC: 10 MG/DL (ref 7–30)
BUN SERPL-MCNC: 75 MG/DL (ref 7–30)
BUN SERPL-MCNC: 77 MG/DL (ref 7–30)
BUN SERPL-MCNC: 78 MG/DL (ref 7–30)
BUN SERPL-MCNC: 8 MG/DL (ref 7–30)
BUN SERPL-MCNC: 83 MG/DL (ref 7–30)
BUN SERPL-MCNC: 84 MG/DL (ref 7–30)
BUN SERPL-MCNC: 84 MG/DL (ref 7–30)
BUN SERPL-MCNC: 85 MG/DL (ref 7–30)
BUN SERPL-MCNC: 86 MG/DL (ref 7–30)
BUN SERPL-MCNC: 86 MG/DL (ref 7–30)
BUN SERPL-MCNC: 89 MG/DL (ref 7–30)
BUN SERPL-MCNC: 93 MG/DL (ref 7–30)
BUN SERPL-MCNC: 93 MG/DL (ref 7–30)
BUN SERPL-MCNC: 95 MG/DL (ref 7–30)
CALCIUM SERPL-MCNC: 7.5 MG/DL (ref 8.5–10.1)
CALCIUM SERPL-MCNC: 7.6 MG/DL (ref 8.5–10.1)
CALCIUM SERPL-MCNC: 7.6 MG/DL (ref 8.5–10.1)
CALCIUM SERPL-MCNC: 7.7 MG/DL (ref 8.5–10.1)
CALCIUM SERPL-MCNC: 7.8 MG/DL (ref 8.5–10.1)
CALCIUM SERPL-MCNC: 8 MG/DL (ref 8.5–10.1)
CALCIUM SERPL-MCNC: 8.1 MG/DL (ref 8.5–10.1)
CALCIUM SERPL-MCNC: 8.1 MG/DL (ref 8.5–10.1)
CALCIUM SERPL-MCNC: 8.3 MG/DL (ref 8.5–10.1)
CALCIUM SERPL-MCNC: 8.5 MG/DL (ref 8.5–10.1)
CALCIUM SERPL-MCNC: 8.7 MG/DL (ref 8.5–10.1)
CALCIUM SERPL-MCNC: 8.8 MG/DL (ref 8.5–10.1)
CALCIUM SERPL-MCNC: 8.9 MG/DL (ref 8.5–10.1)
CANCER AG19-9 SERPL IA-ACNC: 1 U/ML
CHLORIDE BLD-SCNC: 100 MMOL/L (ref 94–109)
CHLORIDE BLD-SCNC: 101 MMOL/L (ref 94–109)
CHLORIDE BLD-SCNC: 102 MMOL/L (ref 94–109)
CHLORIDE BLD-SCNC: 102 MMOL/L (ref 94–109)
CHLORIDE BLD-SCNC: 107 MMOL/L (ref 94–109)
CHLORIDE BLD-SCNC: 110 MMOL/L (ref 94–109)
CHLORIDE BLD-SCNC: 111 MMOL/L (ref 94–109)
CHLORIDE BLD-SCNC: 95 MMOL/L (ref 94–109)
CHLORIDE BLD-SCNC: 97 MMOL/L (ref 94–109)
CHLORIDE BLD-SCNC: 99 MMOL/L (ref 94–109)
CHLORIDE SERPL-SCNC: 110 MMOL/L (ref 94–109)
CO2 SERPL-SCNC: 17 MMOL/L (ref 20–32)
CO2 SERPL-SCNC: 17 MMOL/L (ref 20–32)
CO2 SERPL-SCNC: 18 MMOL/L (ref 20–32)
CO2 SERPL-SCNC: 19 MMOL/L (ref 20–32)
CO2 SERPL-SCNC: 20 MMOL/L (ref 20–32)
CO2 SERPL-SCNC: 21 MMOL/L (ref 20–32)
CO2 SERPL-SCNC: 22 MMOL/L (ref 20–32)
CO2 SERPL-SCNC: 23 MMOL/L (ref 20–32)
CO2 SERPL-SCNC: 25 MMOL/L (ref 20–32)
CO2 SERPL-SCNC: 25 MMOL/L (ref 20–32)
CO2 SERPL-SCNC: 28 MMOL/L (ref 20–32)
CO2 SERPL-SCNC: 29 MMOL/L (ref 20–32)
CODING SYSTEM: NORMAL
COLOR FLD: ABNORMAL
COLOR UR AUTO: ABNORMAL
CREAT SERPL-MCNC: 0.89 MG/DL (ref 0.66–1.25)
CREAT SERPL-MCNC: 0.92 MG/DL (ref 0.66–1.25)
CREAT SERPL-MCNC: 0.95 MG/DL (ref 0.66–1.25)
CREAT SERPL-MCNC: 1.01 MG/DL (ref 0.66–1.25)
CREAT SERPL-MCNC: 2.43 MG/DL (ref 0.66–1.25)
CREAT SERPL-MCNC: 2.5 MG/DL (ref 0.66–1.25)
CREAT SERPL-MCNC: 2.51 MG/DL (ref 0.66–1.25)
CREAT SERPL-MCNC: 2.68 MG/DL (ref 0.66–1.25)
CREAT SERPL-MCNC: 2.82 MG/DL (ref 0.66–1.25)
CREAT SERPL-MCNC: 2.83 MG/DL (ref 0.66–1.25)
CREAT SERPL-MCNC: 2.92 MG/DL (ref 0.66–1.25)
CREAT SERPL-MCNC: 3.05 MG/DL (ref 0.66–1.25)
CREAT SERPL-MCNC: 3.23 MG/DL (ref 0.66–1.25)
CREAT SERPL-MCNC: 3.58 MG/DL (ref 0.66–1.25)
CREAT SERPL-MCNC: 3.58 MG/DL (ref 0.66–1.25)
CREAT SERPL-MCNC: 3.67 MG/DL (ref 0.66–1.25)
CREAT SERPL-MCNC: 3.77 MG/DL (ref 0.66–1.25)
CREAT UR-MCNC: 74 MG/DL
CROSSMATCH: NORMAL
DIASTOLIC BLOOD PRESSURE - MUSE: NORMAL MMHG
EOSINOPHIL # BLD AUTO: 0.4 10E3/UL (ref 0–0.7)
EOSINOPHIL # BLD AUTO: 0.5 10E3/UL (ref 0–0.7)
EOSINOPHIL # BLD AUTO: 1.9 10E3/UL (ref 0–0.7)
EOSINOPHIL # BLD AUTO: 2.2 10E3/UL (ref 0–0.7)
EOSINOPHIL NFR BLD AUTO: 29 %
EOSINOPHIL NFR BLD AUTO: 3 %
EOSINOPHIL NFR BLD AUTO: 4 %
EOSINOPHIL NFR BLD AUTO: 42 %
EOSINOPHIL NFR FLD MANUAL: 1 %
ERYTHROCYTE [DISTWIDTH] IN BLOOD BY AUTOMATED COUNT: 14.6 % (ref 10–15)
ERYTHROCYTE [DISTWIDTH] IN BLOOD BY AUTOMATED COUNT: 15.4 % (ref 10–15)
ERYTHROCYTE [DISTWIDTH] IN BLOOD BY AUTOMATED COUNT: 15.7 % (ref 10–15)
ERYTHROCYTE [DISTWIDTH] IN BLOOD BY AUTOMATED COUNT: 15.9 % (ref 10–15)
ERYTHROCYTE [DISTWIDTH] IN BLOOD BY AUTOMATED COUNT: 17.1 % (ref 10–15)
ERYTHROCYTE [DISTWIDTH] IN BLOOD BY AUTOMATED COUNT: 17.3 % (ref 10–15)
ERYTHROCYTE [DISTWIDTH] IN BLOOD BY AUTOMATED COUNT: 17.4 % (ref 10–15)
ERYTHROCYTE [DISTWIDTH] IN BLOOD BY AUTOMATED COUNT: 17.4 % (ref 10–15)
ERYTHROCYTE [DISTWIDTH] IN BLOOD BY AUTOMATED COUNT: 17.5 % (ref 10–15)
ERYTHROCYTE [DISTWIDTH] IN BLOOD BY AUTOMATED COUNT: 17.5 % (ref 10–15)
ERYTHROCYTE [DISTWIDTH] IN BLOOD BY AUTOMATED COUNT: 17.6 % (ref 10–15)
ERYTHROCYTE [DISTWIDTH] IN BLOOD BY AUTOMATED COUNT: 17.9 % (ref 10–15)
ERYTHROCYTE [DISTWIDTH] IN BLOOD BY AUTOMATED COUNT: 18.1 % (ref 10–15)
ERYTHROCYTE [DISTWIDTH] IN BLOOD BY AUTOMATED COUNT: 18.2 % (ref 10–15)
FIBRINOGEN PPP-MCNC: 192 MG/DL (ref 170–490)
GFR SERPL CREATININE-BSD FRML MDRD: 16 ML/MIN/1.73M2
GFR SERPL CREATININE-BSD FRML MDRD: 17 ML/MIN/1.73M2
GFR SERPL CREATININE-BSD FRML MDRD: 18 ML/MIN/1.73M2
GFR SERPL CREATININE-BSD FRML MDRD: 18 ML/MIN/1.73M2
GFR SERPL CREATININE-BSD FRML MDRD: 20 ML/MIN/1.73M2
GFR SERPL CREATININE-BSD FRML MDRD: 21 ML/MIN/1.73M2
GFR SERPL CREATININE-BSD FRML MDRD: 22 ML/MIN/1.73M2
GFR SERPL CREATININE-BSD FRML MDRD: 23 ML/MIN/1.73M2
GFR SERPL CREATININE-BSD FRML MDRD: 23 ML/MIN/1.73M2
GFR SERPL CREATININE-BSD FRML MDRD: 25 ML/MIN/1.73M2
GFR SERPL CREATININE-BSD FRML MDRD: 27 ML/MIN/1.73M2
GFR SERPL CREATININE-BSD FRML MDRD: 27 ML/MIN/1.73M2
GFR SERPL CREATININE-BSD FRML MDRD: 28 ML/MIN/1.73M2
GFR SERPL CREATININE-BSD FRML MDRD: 81 ML/MIN/1.73M2
GFR SERPL CREATININE-BSD FRML MDRD: 87 ML/MIN/1.73M2
GFR SERPL CREATININE-BSD FRML MDRD: >90 ML/MIN/1.73M2
GFR SERPL CREATININE-BSD FRML MDRD: >90 ML/MIN/{1.73_M2}
GLUCOSE BLD-MCNC: 109 MG/DL (ref 70–99)
GLUCOSE BLD-MCNC: 113 MG/DL (ref 70–99)
GLUCOSE BLD-MCNC: 114 MG/DL (ref 70–99)
GLUCOSE BLD-MCNC: 116 MG/DL (ref 70–99)
GLUCOSE BLD-MCNC: 116 MG/DL (ref 70–99)
GLUCOSE BLD-MCNC: 117 MG/DL (ref 70–99)
GLUCOSE BLD-MCNC: 117 MG/DL (ref 70–99)
GLUCOSE BLD-MCNC: 122 MG/DL (ref 70–99)
GLUCOSE BLD-MCNC: 132 MG/DL (ref 70–99)
GLUCOSE BLD-MCNC: 134 MG/DL (ref 70–99)
GLUCOSE BLD-MCNC: 149 MG/DL (ref 70–99)
GLUCOSE BLD-MCNC: 154 MG/DL (ref 70–99)
GLUCOSE BLD-MCNC: 91 MG/DL (ref 70–99)
GLUCOSE BLD-MCNC: 97 MG/DL (ref 70–99)
GLUCOSE BLD-MCNC: 99 MG/DL (ref 70–99)
GLUCOSE BLD-MCNC: 99 MG/DL (ref 70–99)
GLUCOSE SERPL-MCNC: 137 MG/DL (ref 70–99)
GLUCOSE UR STRIP-MCNC: NEGATIVE MG/DL
GRAM STAIN RESULT: NORMAL
GRAM STAIN RESULT: NORMAL
HCO3 BLDV-SCNC: 19 MMOL/L (ref 21–28)
HCT VFR BLD AUTO: 20 % (ref 40–53)
HCT VFR BLD AUTO: 21.5 % (ref 40–53)
HCT VFR BLD AUTO: 21.7 % (ref 40–53)
HCT VFR BLD AUTO: 23.3 % (ref 40–53)
HCT VFR BLD AUTO: 23.3 % (ref 40–53)
HCT VFR BLD AUTO: 25.7 % (ref 40–53)
HCT VFR BLD AUTO: 25.9 % (ref 40–53)
HCT VFR BLD AUTO: 26 % (ref 40–53)
HCT VFR BLD AUTO: 26.9 % (ref 40–53)
HCT VFR BLD AUTO: 26.9 % (ref 40–53)
HCT VFR BLD AUTO: 27 % (ref 40–53)
HCT VFR BLD AUTO: 28.1 % (ref 40–53)
HCT VFR BLD AUTO: 31.7 % (ref 40–53)
HCT VFR BLD AUTO: 35 % (ref 40–53)
HCT VFR BLD AUTO: 35.1 % (ref 40–53)
HCT VFR BLD AUTO: 35.3 % (ref 40–53)
HGB BLD-MCNC: 10.6 G/DL (ref 13.3–17.7)
HGB BLD-MCNC: 11.7 G/DL (ref 13.3–17.7)
HGB BLD-MCNC: 12 G/DL (ref 13.3–17.7)
HGB BLD-MCNC: 12 G/DL (ref 13.3–17.7)
HGB BLD-MCNC: 5.4 G/DL (ref 13.3–17.7)
HGB BLD-MCNC: 6.3 G/DL (ref 13.3–17.7)
HGB BLD-MCNC: 6.3 G/DL (ref 13.3–17.7)
HGB BLD-MCNC: 6.9 G/DL (ref 13.3–17.7)
HGB BLD-MCNC: 7.1 G/DL (ref 13.3–17.7)
HGB BLD-MCNC: 7.4 G/DL (ref 13.3–17.7)
HGB BLD-MCNC: 7.4 G/DL (ref 13.3–17.7)
HGB BLD-MCNC: 7.5 G/DL (ref 13.3–17.7)
HGB BLD-MCNC: 8.1 G/DL (ref 13.3–17.7)
HGB BLD-MCNC: 8.2 G/DL (ref 13.3–17.7)
HGB BLD-MCNC: 8.5 G/DL (ref 13.3–17.7)
HGB BLD-MCNC: 8.7 G/DL (ref 13.3–17.7)
HGB BLD-MCNC: 8.8 G/DL (ref 13.3–17.7)
HGB BLD-MCNC: 8.8 G/DL (ref 13.3–17.7)
HGB BLD-MCNC: 8.9 G/DL (ref 13.3–17.7)
HGB BLD-MCNC: 9 G/DL (ref 13.3–17.7)
HGB BLD-MCNC: 9.1 G/DL (ref 13.3–17.7)
HGB BLD-MCNC: 9.2 G/DL (ref 13.3–17.7)
HGB BLD-MCNC: 9.4 G/DL (ref 13.3–17.7)
HGB BLD-MCNC: 9.5 G/DL (ref 13.3–17.7)
HGB UR QL STRIP: ABNORMAL
HOLD SPECIMEN: NORMAL
HYALINE CASTS: 6 /LPF
IMM GRANULOCYTES # BLD: 0 10E3/UL
IMM GRANULOCYTES # BLD: 0 10E3/UL
IMM GRANULOCYTES # BLD: 0.1 10E3/UL
IMM GRANULOCYTES # BLD: 0.2 10E3/UL
IMM GRANULOCYTES NFR BLD: 0 %
IMM GRANULOCYTES NFR BLD: 0 %
IMM GRANULOCYTES NFR BLD: 1 %
IMM GRANULOCYTES NFR BLD: 2 %
INR PPP: 1.49 (ref 0.85–1.15)
INR PPP: 1.55 (ref 0.86–1.14)
INR PPP: 1.85 (ref 0.85–1.15)
INR PPP: 1.92 (ref 0.85–1.15)
INR PPP: 1.98 (ref 0.85–1.15)
INR PPP: 2.03 (ref 0.85–1.15)
INR PPP: 2.07 (ref 0.85–1.15)
INR PPP: 2.07 (ref 0.85–1.15)
INR PPP: 2.13 (ref 0.85–1.15)
INR PPP: 2.21 (ref 0.85–1.15)
INTERPRETATION ECG - MUSE: NORMAL
ISSUE DATE AND TIME: NORMAL
KETONES UR STRIP-MCNC: NEGATIVE MG/DL
LACTATE SERPL-SCNC: 1.5 MMOL/L (ref 0.7–2)
LACTATE SERPL-SCNC: 1.8 MMOL/L (ref 0.7–2)
LACTATE SERPL-SCNC: 2.1 MMOL/L (ref 0.7–2)
LACTATE SERPL-SCNC: 2.6 MMOL/L (ref 0.7–2)
LACTATE SERPL-SCNC: 2.6 MMOL/L (ref 0.7–2)
LACTATE SERPL-SCNC: 3 MMOL/L (ref 0.7–2)
LEUKOCYTE ESTERASE UR QL STRIP: NEGATIVE
LIPASE SERPL-CCNC: 6327 U/L (ref 73–393)
LYMPHOCYTES # BLD AUTO: 0.5 10E3/UL (ref 0.8–5.3)
LYMPHOCYTES # BLD AUTO: 0.6 10E3/UL (ref 0.8–5.3)
LYMPHOCYTES # BLD AUTO: 1 10E3/UL (ref 0.8–5.3)
LYMPHOCYTES # BLD AUTO: 1.2 10E3/UL (ref 0.8–5.3)
LYMPHOCYTES NFR BLD AUTO: 18 %
LYMPHOCYTES NFR BLD AUTO: 19 %
LYMPHOCYTES NFR BLD AUTO: 4 %
LYMPHOCYTES NFR BLD AUTO: 4 %
LYMPHOCYTES NFR FLD MANUAL: 2 %
MAGNESIUM SERPL-MCNC: 3.1 MG/DL (ref 1.6–2.3)
MAGNESIUM SERPL-MCNC: 3.2 MG/DL (ref 1.6–2.3)
MCH RBC QN AUTO: 30.1 PG (ref 26.5–33)
MCH RBC QN AUTO: 30.2 PG (ref 26.5–33)
MCH RBC QN AUTO: 30.3 PG (ref 26.5–33)
MCH RBC QN AUTO: 30.3 PG (ref 26.5–33)
MCH RBC QN AUTO: 30.4 PG (ref 26.5–33)
MCH RBC QN AUTO: 30.5 PG (ref 26.5–33)
MCH RBC QN AUTO: 30.5 PG (ref 26.5–33)
MCH RBC QN AUTO: 30.7 PG (ref 26.5–33)
MCH RBC QN AUTO: 31 PG (ref 26.5–33)
MCH RBC QN AUTO: 31 PG (ref 26.5–33)
MCH RBC QN AUTO: 32.1 PG (ref 26.5–33)
MCH RBC QN AUTO: 32.2 PG (ref 26.5–33)
MCH RBC QN AUTO: 32.2 PG (ref 26.5–33)
MCH RBC QN AUTO: 34 PG (ref 26.5–33)
MCHC RBC AUTO-ENTMCNC: 31.5 G/DL (ref 31.5–36.5)
MCHC RBC AUTO-ENTMCNC: 31.5 G/DL (ref 31.5–36.5)
MCHC RBC AUTO-ENTMCNC: 31.8 G/DL (ref 31.5–36.5)
MCHC RBC AUTO-ENTMCNC: 32.1 G/DL (ref 31.5–36.5)
MCHC RBC AUTO-ENTMCNC: 32.2 G/DL (ref 31.5–36.5)
MCHC RBC AUTO-ENTMCNC: 32.7 G/DL (ref 31.5–36.5)
MCHC RBC AUTO-ENTMCNC: 33 G/DL (ref 31.5–36.5)
MCHC RBC AUTO-ENTMCNC: 33.3 G/DL (ref 31.5–36.5)
MCHC RBC AUTO-ENTMCNC: 33.4 G/DL (ref 31.5–36.5)
MCHC RBC AUTO-ENTMCNC: 33.5 G/DL (ref 31.5–36.5)
MCHC RBC AUTO-ENTMCNC: 33.5 G/DL (ref 31.5–36.5)
MCHC RBC AUTO-ENTMCNC: 33.6 G/DL (ref 31.5–36.5)
MCHC RBC AUTO-ENTMCNC: 34 G/DL (ref 31.5–36.5)
MCHC RBC AUTO-ENTMCNC: 34.3 G/DL (ref 31.5–36.5)
MCV RBC AUTO: 102 FL (ref 78–100)
MCV RBC AUTO: 91 FL (ref 78–100)
MCV RBC AUTO: 92 FL (ref 78–100)
MCV RBC AUTO: 93 FL (ref 78–100)
MCV RBC AUTO: 94 FL (ref 78–100)
MCV RBC AUTO: 95 FL (ref 78–100)
MCV RBC AUTO: 96 FL (ref 78–100)
MCV RBC AUTO: 96 FL (ref 78–100)
MCV RBC AUTO: 97 FL (ref 78–100)
MCV RBC AUTO: 97 FL (ref 78–100)
MONOCYTES # BLD AUTO: 0.7 10E3/UL (ref 0–1.3)
MONOCYTES # BLD AUTO: 0.9 10E3/UL (ref 0–1.3)
MONOCYTES # BLD AUTO: 1.5 10E3/UL (ref 0–1.3)
MONOCYTES # BLD AUTO: 1.6 10E3/UL (ref 0–1.3)
MONOCYTES NFR BLD AUTO: 11 %
MONOCYTES NFR BLD AUTO: 12 %
MONOCYTES NFR BLD AUTO: 13 %
MONOCYTES NFR BLD AUTO: 14 %
MONOS+MACROS NFR FLD MANUAL: NORMAL %
MUCOUS THREADS #/AREA URNS LPF: PRESENT /LPF
NEUTROPHILS # BLD AUTO: 1.3 10E3/UL (ref 1.6–8.3)
NEUTROPHILS # BLD AUTO: 10.6 10E3/UL (ref 1.6–8.3)
NEUTROPHILS # BLD AUTO: 10.8 10E3/UL (ref 1.6–8.3)
NEUTROPHILS # BLD AUTO: 2.4 10E3/UL (ref 1.6–8.3)
NEUTROPHILS NFR BLD AUTO: 24 %
NEUTROPHILS NFR BLD AUTO: 37 %
NEUTROPHILS NFR BLD AUTO: 78 %
NEUTROPHILS NFR BLD AUTO: 81 %
NEUTS BAND NFR FLD MANUAL: 14 %
NITRATE UR QL: NEGATIVE
NRBC # BLD AUTO: 0 10E3/UL
NRBC BLD AUTO-RTO: 0 /100
O2/TOTAL GAS SETTING VFR VENT: 30 %
OSMOLALITY SERPL: 296 MMOL/KG (ref 275–295)
OSMOLALITY UR: 370 MMOL/KG (ref 100–1200)
OTHER CELLS FLD MANUAL: 85 %
P AXIS - MUSE: 63 DEGREES
PATH REPORT.COMMENTS IMP SPEC: NORMAL
PATH REPORT.FINAL DX SPEC: NORMAL
PATH REPORT.FINAL DX SPEC: NORMAL
PATH REPORT.GROSS SPEC: NORMAL
PATH REPORT.MICROSCOPIC SPEC OTHER STN: NORMAL
PATH REPORT.MICROSCOPIC SPEC OTHER STN: NORMAL
PATH REPORT.RELEVANT HX SPEC: NORMAL
PATH REPORT.RELEVANT HX SPEC: NORMAL
PCO2 BLDV: 27 MM HG (ref 40–50)
PETH BLD-MCNC: 134 NG/ML
PH BLDV: 7.46 [PH] (ref 7.32–7.43)
PH UR STRIP: 5 [PH] (ref 5–7)
PHOSPHATE SERPL-MCNC: 4.4 MG/DL (ref 2.5–4.5)
PLATELET # BLD AUTO: 113 10E3/UL (ref 150–450)
PLATELET # BLD AUTO: 135 10E3/UL (ref 150–450)
PLATELET # BLD AUTO: 41 10E3/UL (ref 150–450)
PLATELET # BLD AUTO: 54 10E3/UL (ref 150–450)
PLATELET # BLD AUTO: 57 10E3/UL (ref 150–450)
PLATELET # BLD AUTO: 57 10E9/L (ref 150–450)
PLATELET # BLD AUTO: 58 10E3/UL (ref 150–450)
PLATELET # BLD AUTO: 63 10E3/UL (ref 150–450)
PLATELET # BLD AUTO: 64 10E3/UL (ref 150–450)
PLATELET # BLD AUTO: 71 10E3/UL (ref 150–450)
PLATELET # BLD AUTO: 71 10E3/UL (ref 150–450)
PLATELET # BLD AUTO: 75 10E3/UL (ref 150–450)
PLATELET # BLD AUTO: 77 10E3/UL (ref 150–450)
PLATELET # BLD AUTO: 79 10E3/UL (ref 150–450)
PLATELET # BLD AUTO: 88 10E3/UL (ref 150–450)
PLATELET # BLD AUTO: 98 10E3/UL (ref 150–450)
PO2 BLDV: 54 MM HG (ref 25–47)
POTASSIUM BLD-SCNC: 3.3 MMOL/L (ref 3.4–5.3)
POTASSIUM BLD-SCNC: 3.4 MMOL/L (ref 3.4–5.3)
POTASSIUM BLD-SCNC: 3.4 MMOL/L (ref 3.4–5.3)
POTASSIUM BLD-SCNC: 3.6 MMOL/L (ref 3.4–5.3)
POTASSIUM BLD-SCNC: 3.7 MMOL/L (ref 3.4–5.3)
POTASSIUM BLD-SCNC: 3.8 MMOL/L (ref 3.4–5.3)
POTASSIUM BLD-SCNC: 3.9 MMOL/L (ref 3.4–5.3)
POTASSIUM BLD-SCNC: 4 MMOL/L (ref 3.4–5.3)
POTASSIUM BLD-SCNC: 4.2 MMOL/L (ref 3.4–5.3)
POTASSIUM BLD-SCNC: 4.4 MMOL/L (ref 3.4–5.3)
POTASSIUM BLD-SCNC: 4.4 MMOL/L (ref 3.4–5.3)
POTASSIUM BLD-SCNC: 4.9 MMOL/L (ref 3.4–5.3)
POTASSIUM SERPL-SCNC: 3.7 MMOL/L (ref 3.4–5.3)
PR INTERVAL - MUSE: 174 MS
PROT FLD-MCNC: 1 G/DL
PROT SERPL-MCNC: 5.3 G/DL (ref 6.8–8.8)
PROT SERPL-MCNC: 5.6 G/DL (ref 6.8–8.8)
PROT SERPL-MCNC: 5.7 G/DL (ref 6.8–8.8)
PROT SERPL-MCNC: 5.8 G/DL (ref 6.8–8.8)
PROT SERPL-MCNC: 6 G/DL (ref 6.8–8.8)
PROT SERPL-MCNC: 6.1 G/DL (ref 6.8–8.8)
PROT SERPL-MCNC: 6.1 G/DL (ref 6.8–8.8)
PROT SERPL-MCNC: 6.5 G/DL (ref 6.8–8.8)
PROT SERPL-MCNC: 7 G/DL (ref 6.8–8.8)
PROT SERPL-MCNC: 7.1 G/DL (ref 6.8–8.8)
PROT SERPL-MCNC: 7.3 G/DL (ref 6.8–8.8)
QRS DURATION - MUSE: 108 MS
QT - MUSE: 500 MS
QTC - MUSE: 570 MS
R AXIS - MUSE: 50 DEGREES
RADIOLOGIST FLAGS: ABNORMAL
RADIOLOGIST FLAGS: NORMAL
RBC # BLD AUTO: 2.09 10E6/UL (ref 4.4–5.9)
RBC # BLD AUTO: 2.28 10E6/UL (ref 4.4–5.9)
RBC # BLD AUTO: 2.34 10E6/UL (ref 4.4–5.9)
RBC # BLD AUTO: 2.42 10E6/UL (ref 4.4–5.9)
RBC # BLD AUTO: 2.45 10E6/UL (ref 4.4–5.9)
RBC # BLD AUTO: 2.66 10E6/UL (ref 4.4–5.9)
RBC # BLD AUTO: 2.8 10E6/UL (ref 4.4–5.9)
RBC # BLD AUTO: 2.83 10E6/UL (ref 4.4–5.9)
RBC # BLD AUTO: 2.89 10E6/UL (ref 4.4–5.9)
RBC # BLD AUTO: 2.92 10E6/UL (ref 4.4–5.9)
RBC # BLD AUTO: 2.96 10E6/UL (ref 4.4–5.9)
RBC # BLD AUTO: 3.03 10E6/UL (ref 4.4–5.9)
RBC # BLD AUTO: 3.12 10E12/L (ref 4.4–5.9)
RBC # BLD AUTO: 3.63 10E6/UL (ref 4.4–5.9)
RBC # BLD AUTO: 3.73 10E6/UL (ref 4.4–5.9)
RBC # BLD AUTO: 3.74 10E6/UL (ref 4.4–5.9)
RBC MORPH BLD: NORMAL
RBC URINE: 1 /HPF
RETICS # AUTO: 0.15 10E6/UL (ref 0.03–0.1)
RETICS/RBC NFR AUTO: 6.1 % (ref 0.5–2)
SARS-COV-2 RNA RESP QL NAA+PROBE: NEGATIVE
SARS-COV-2 RNA RESP QL NAA+PROBE: NEGATIVE
SODIUM SERPL-SCNC: 128 MMOL/L (ref 133–144)
SODIUM SERPL-SCNC: 128 MMOL/L (ref 133–144)
SODIUM SERPL-SCNC: 130 MMOL/L (ref 133–144)
SODIUM SERPL-SCNC: 132 MMOL/L (ref 133–144)
SODIUM SERPL-SCNC: 133 MMOL/L (ref 133–144)
SODIUM SERPL-SCNC: 135 MMOL/L (ref 133–144)
SODIUM SERPL-SCNC: 137 MMOL/L (ref 133–144)
SODIUM SERPL-SCNC: 140 MMOL/L (ref 133–144)
SODIUM SERPL-SCNC: 143 MMOL/L (ref 133–144)
SODIUM SERPL-SCNC: 144 MMOL/L (ref 133–144)
SODIUM UR-SCNC: 7 MMOL/L
SP GR UR STRIP: 1.03 (ref 1–1.03)
SPECIMEN EXPIRATION DATE: NORMAL
SPECIMEN EXPIRATION DATE: NORMAL
SYSTOLIC BLOOD PRESSURE - MUSE: NORMAL MMHG
T AXIS - MUSE: 27 DEGREES
TSH SERPL DL<=0.005 MIU/L-ACNC: 2.32 MU/L (ref 0.4–4)
UNIT ABO/RH: NORMAL
UNIT NUMBER: NORMAL
UNIT STATUS: NORMAL
UNIT TYPE ISBT: 5100
UPPER GI ENDOSCOPY: NORMAL
UROBILINOGEN UR STRIP-MCNC: NORMAL MG/DL
UUN UR-MCNC: 550 MG/DL
UUN/CREAT 24H UR: 7 G/G CR
VENTRICULAR RATE- MUSE: 78 BPM
WBC # BLD AUTO: 10.8 10E3/UL (ref 4–11)
WBC # BLD AUTO: 12.1 10E3/UL (ref 4–11)
WBC # BLD AUTO: 12.4 10E3/UL (ref 4–11)
WBC # BLD AUTO: 13.4 10E3/UL (ref 4–11)
WBC # BLD AUTO: 13.4 10E3/UL (ref 4–11)
WBC # BLD AUTO: 13.6 10E3/UL (ref 4–11)
WBC # BLD AUTO: 13.7 10E3/UL (ref 4–11)
WBC # BLD AUTO: 13.7 10E3/UL (ref 4–11)
WBC # BLD AUTO: 13.8 10E3/UL (ref 4–11)
WBC # BLD AUTO: 14 10E3/UL (ref 4–11)
WBC # BLD AUTO: 14.1 10E3/UL (ref 4–11)
WBC # BLD AUTO: 16.9 10E3/UL (ref 4–11)
WBC # BLD AUTO: 4.4 10E9/L (ref 4–11)
WBC # BLD AUTO: 5.4 10E3/UL (ref 4–11)
WBC # BLD AUTO: 6.3 10E3/UL (ref 4–11)
WBC # BLD AUTO: 6.5 10E3/UL (ref 4–11)
WBC # FLD AUTO: 5579 /UL
WBC URINE: 3 /HPF

## 2021-01-01 PROCEDURE — 85027 COMPLETE CBC AUTOMATED: CPT | Performed by: STUDENT IN AN ORGANIZED HEALTH CARE EDUCATION/TRAINING PROGRAM

## 2021-01-01 PROCEDURE — 250N000013 HC RX MED GY IP 250 OP 250 PS 637

## 2021-01-01 PROCEDURE — 83605 ASSAY OF LACTIC ACID: CPT | Performed by: NURSE PRACTITIONER

## 2021-01-01 PROCEDURE — 83735 ASSAY OF MAGNESIUM: CPT

## 2021-01-01 PROCEDURE — 250N000011 HC RX IP 250 OP 636

## 2021-01-01 PROCEDURE — 120N000002 HC R&B MED SURG/OB UMMC

## 2021-01-01 PROCEDURE — 99232 SBSQ HOSP IP/OBS MODERATE 35: CPT | Mod: GC | Performed by: STUDENT IN AN ORGANIZED HEALTH CARE EDUCATION/TRAINING PROGRAM

## 2021-01-01 PROCEDURE — 80321 ALCOHOLS BIOMARKERS 1OR 2: CPT | Performed by: STUDENT IN AN ORGANIZED HEALTH CARE EDUCATION/TRAINING PROGRAM

## 2021-01-01 PROCEDURE — 250N000011 HC RX IP 250 OP 636: Performed by: STUDENT IN AN ORGANIZED HEALTH CARE EDUCATION/TRAINING PROGRAM

## 2021-01-01 PROCEDURE — 36415 COLL VENOUS BLD VENIPUNCTURE: CPT

## 2021-01-01 PROCEDURE — 250N000013 HC RX MED GY IP 250 OP 250 PS 637: Performed by: STUDENT IN AN ORGANIZED HEALTH CARE EDUCATION/TRAINING PROGRAM

## 2021-01-01 PROCEDURE — 71250 CT THORAX DX C-: CPT

## 2021-01-01 PROCEDURE — 99233 SBSQ HOSP IP/OBS HIGH 50: CPT | Mod: GC | Performed by: STUDENT IN AN ORGANIZED HEALTH CARE EDUCATION/TRAINING PROGRAM

## 2021-01-01 PROCEDURE — 120N000003 HC R&B IMCU UMMC

## 2021-01-01 PROCEDURE — 99207 PR NON-BILLABLE SERV PER CHARTING: CPT | Performed by: INTERNAL MEDICINE

## 2021-01-01 PROCEDURE — P9016 RBC LEUKOCYTES REDUCED: HCPCS

## 2021-01-01 PROCEDURE — 99233 SBSQ HOSP IP/OBS HIGH 50: CPT | Mod: GC | Performed by: INTERNAL MEDICINE

## 2021-01-01 PROCEDURE — 96413 CHEMO IV INFUSION 1 HR: CPT

## 2021-01-01 PROCEDURE — 87205 SMEAR GRAM STAIN: CPT

## 2021-01-01 PROCEDURE — 99232 SBSQ HOSP IP/OBS MODERATE 35: CPT | Performed by: INTERNAL MEDICINE

## 2021-01-01 PROCEDURE — 36415 COLL VENOUS BLD VENIPUNCTURE: CPT | Performed by: INTERNAL MEDICINE

## 2021-01-01 PROCEDURE — 83935 ASSAY OF URINE OSMOLALITY: CPT | Performed by: STUDENT IN AN ORGANIZED HEALTH CARE EDUCATION/TRAINING PROGRAM

## 2021-01-01 PROCEDURE — 86923 COMPATIBILITY TEST ELECTRIC: CPT

## 2021-01-01 PROCEDURE — 250N000011 HC RX IP 250 OP 636: Performed by: NURSE PRACTITIONER

## 2021-01-01 PROCEDURE — 250N000013 HC RX MED GY IP 250 OP 250 PS 637: Performed by: INTERNAL MEDICINE

## 2021-01-01 PROCEDURE — U0005 INFEC AGEN DETEC AMPLI PROBE: HCPCS

## 2021-01-01 PROCEDURE — 99223 1ST HOSP IP/OBS HIGH 75: CPT | Mod: GC | Performed by: INTERNAL MEDICINE

## 2021-01-01 PROCEDURE — 99233 SBSQ HOSP IP/OBS HIGH 50: CPT | Mod: GC | Performed by: PEDIATRICS

## 2021-01-01 PROCEDURE — 85027 COMPLETE CBC AUTOMATED: CPT

## 2021-01-01 PROCEDURE — 96374 THER/PROPH/DIAG INJ IV PUSH: CPT | Mod: 59 | Performed by: EMERGENCY MEDICINE

## 2021-01-01 PROCEDURE — 99203 OFFICE O/P NEW LOW 30 MIN: CPT | Performed by: PHYSICIAN ASSISTANT

## 2021-01-01 PROCEDURE — 71270 CT THORAX DX C-/C+: CPT | Mod: 26 | Performed by: RADIOLOGY

## 2021-01-01 PROCEDURE — 85060 BLOOD SMEAR INTERPRETATION: CPT | Mod: 26 | Performed by: STUDENT IN AN ORGANIZED HEALTH CARE EDUCATION/TRAINING PROGRAM

## 2021-01-01 PROCEDURE — 74178 CT ABD&PLV WO CNTR FLWD CNTR: CPT | Mod: 26 | Performed by: RADIOLOGY

## 2021-01-01 PROCEDURE — 99285 EMERGENCY DEPT VISIT HI MDM: CPT | Mod: 25 | Performed by: EMERGENCY MEDICINE

## 2021-01-01 PROCEDURE — 85018 HEMOGLOBIN: CPT | Performed by: STUDENT IN AN ORGANIZED HEALTH CARE EDUCATION/TRAINING PROGRAM

## 2021-01-01 PROCEDURE — 76705 ECHO EXAM OF ABDOMEN: CPT

## 2021-01-01 PROCEDURE — 99207 PR CONSULT E&M CHANGED TO INITIAL LEVEL: CPT | Performed by: STUDENT IN AN ORGANIZED HEALTH CARE EDUCATION/TRAINING PROGRAM

## 2021-01-01 PROCEDURE — 85018 HEMOGLOBIN: CPT

## 2021-01-01 PROCEDURE — P9047 ALBUMIN (HUMAN), 25%, 50ML: HCPCS

## 2021-01-01 PROCEDURE — 36415 COLL VENOUS BLD VENIPUNCTURE: CPT | Performed by: STUDENT IN AN ORGANIZED HEALTH CARE EDUCATION/TRAINING PROGRAM

## 2021-01-01 PROCEDURE — 99233 SBSQ HOSP IP/OBS HIGH 50: CPT | Performed by: STUDENT IN AN ORGANIZED HEALTH CARE EDUCATION/TRAINING PROGRAM

## 2021-01-01 PROCEDURE — 85014 HEMATOCRIT: CPT | Performed by: STUDENT IN AN ORGANIZED HEALTH CARE EDUCATION/TRAINING PROGRAM

## 2021-01-01 PROCEDURE — 86301 IMMUNOASSAY TUMOR CA 19-9: CPT | Mod: 90

## 2021-01-01 PROCEDURE — 36415 COLL VENOUS BLD VENIPUNCTURE: CPT | Performed by: NURSE PRACTITIONER

## 2021-01-01 PROCEDURE — 85027 COMPLETE CBC AUTOMATED: CPT | Performed by: FAMILY MEDICINE

## 2021-01-01 PROCEDURE — 99238 HOSP IP/OBS DSCHRG MGMT 30/<: CPT | Mod: GC | Performed by: PEDIATRICS

## 2021-01-01 PROCEDURE — 250N000013 HC RX MED GY IP 250 OP 250 PS 637: Performed by: PHYSICIAN ASSISTANT

## 2021-01-01 PROCEDURE — 99233 SBSQ HOSP IP/OBS HIGH 50: CPT | Performed by: INTERNAL MEDICINE

## 2021-01-01 PROCEDURE — 76705 ECHO EXAM OF ABDOMEN: CPT | Mod: 26 | Performed by: STUDENT IN AN ORGANIZED HEALTH CARE EDUCATION/TRAINING PROGRAM

## 2021-01-01 PROCEDURE — 83605 ASSAY OF LACTIC ACID: CPT | Performed by: STUDENT IN AN ORGANIZED HEALTH CARE EDUCATION/TRAINING PROGRAM

## 2021-01-01 PROCEDURE — 999N000128 HC STATISTIC PERIPHERAL IV START W/O US GUIDANCE

## 2021-01-01 PROCEDURE — 82140 ASSAY OF AMMONIA: CPT | Performed by: STUDENT IN AN ORGANIZED HEALTH CARE EDUCATION/TRAINING PROGRAM

## 2021-01-01 PROCEDURE — 80053 COMPREHEN METABOLIC PANEL: CPT | Performed by: FAMILY MEDICINE

## 2021-01-01 PROCEDURE — 99215 OFFICE O/P EST HI 40 MIN: CPT | Mod: 95 | Performed by: INTERNAL MEDICINE

## 2021-01-01 PROCEDURE — 89051 BODY FLUID CELL COUNT: CPT

## 2021-01-01 PROCEDURE — 99232 SBSQ HOSP IP/OBS MODERATE 35: CPT | Performed by: STUDENT IN AN ORGANIZED HEALTH CARE EDUCATION/TRAINING PROGRAM

## 2021-01-01 PROCEDURE — 999N001193 HC VIDEO/TELEPHONE VISIT; NO CHARGE

## 2021-01-01 PROCEDURE — P9047 ALBUMIN (HUMAN), 25%, 50ML: HCPCS | Performed by: STUDENT IN AN ORGANIZED HEALTH CARE EDUCATION/TRAINING PROGRAM

## 2021-01-01 PROCEDURE — 99232 SBSQ HOSP IP/OBS MODERATE 35: CPT | Mod: GC | Performed by: INTERNAL MEDICINE

## 2021-01-01 PROCEDURE — 85027 COMPLETE CBC AUTOMATED: CPT | Performed by: INTERNAL MEDICINE

## 2021-01-01 PROCEDURE — 86900 BLOOD TYPING SEROLOGIC ABO: CPT | Performed by: INTERNAL MEDICINE

## 2021-01-01 PROCEDURE — 82042 OTHER SOURCE ALBUMIN QUAN EA: CPT

## 2021-01-01 PROCEDURE — 85610 PROTHROMBIN TIME: CPT | Performed by: EMERGENCY MEDICINE

## 2021-01-01 PROCEDURE — 74183 MRI ABD W/O CNTR FLWD CNTR: CPT | Performed by: RADIOLOGY

## 2021-01-01 PROCEDURE — 0W9G3ZZ DRAINAGE OF PERITONEAL CAVITY, PERCUTANEOUS APPROACH: ICD-10-PCS | Performed by: INTERNAL MEDICINE

## 2021-01-01 PROCEDURE — 74176 CT ABD & PELVIS W/O CONTRAST: CPT | Mod: 26 | Performed by: RADIOLOGY

## 2021-01-01 PROCEDURE — 85384 FIBRINOGEN ACTIVITY: CPT | Performed by: NURSE PRACTITIONER

## 2021-01-01 PROCEDURE — 258N000003 HC RX IP 258 OP 636: Performed by: INTERNAL MEDICINE

## 2021-01-01 PROCEDURE — 99417 PROLNG OP E/M EACH 15 MIN: CPT | Performed by: INTERNAL MEDICINE

## 2021-01-01 PROCEDURE — 84450 TRANSFERASE (AST) (SGOT): CPT | Performed by: STUDENT IN AN ORGANIZED HEALTH CARE EDUCATION/TRAINING PROGRAM

## 2021-01-01 PROCEDURE — 85610 PROTHROMBIN TIME: CPT | Performed by: STUDENT IN AN ORGANIZED HEALTH CARE EDUCATION/TRAINING PROGRAM

## 2021-01-01 PROCEDURE — 85025 COMPLETE CBC W/AUTO DIFF WBC: CPT | Performed by: EMERGENCY MEDICINE

## 2021-01-01 PROCEDURE — 99222 1ST HOSP IP/OBS MODERATE 55: CPT | Performed by: STUDENT IN AN ORGANIZED HEALTH CARE EDUCATION/TRAINING PROGRAM

## 2021-01-01 PROCEDURE — U0003 INFECTIOUS AGENT DETECTION BY NUCLEIC ACID (DNA OR RNA); SEVERE ACUTE RESPIRATORY SYNDROME CORONAVIRUS 2 (SARS-COV-2) (CORONAVIRUS DISEASE [COVID-19]), AMPLIFIED PROBE TECHNIQUE, MAKING USE OF HIGH THROUGHPUT TECHNOLOGIES AS DESCRIBED BY CMS-2020-01-R: HCPCS | Performed by: EMERGENCY MEDICINE

## 2021-01-01 PROCEDURE — 71101 X-RAY EXAM UNILAT RIBS/CHEST: CPT | Mod: RT | Performed by: RADIOLOGY

## 2021-01-01 PROCEDURE — 82040 ASSAY OF SERUM ALBUMIN: CPT | Performed by: STUDENT IN AN ORGANIZED HEALTH CARE EDUCATION/TRAINING PROGRAM

## 2021-01-01 PROCEDURE — 84157 ASSAY OF PROTEIN OTHER: CPT

## 2021-01-01 PROCEDURE — 999N000147 HC STATISTIC PT IP EVAL DEFER

## 2021-01-01 PROCEDURE — 84155 ASSAY OF PROTEIN SERUM: CPT | Performed by: STUDENT IN AN ORGANIZED HEALTH CARE EDUCATION/TRAINING PROGRAM

## 2021-01-01 PROCEDURE — 84540 ASSAY OF URINE/UREA-N: CPT | Performed by: STUDENT IN AN ORGANIZED HEALTH CARE EDUCATION/TRAINING PROGRAM

## 2021-01-01 PROCEDURE — 76705 ECHO EXAM OF ABDOMEN: CPT | Mod: 76

## 2021-01-01 PROCEDURE — 88305 TISSUE EXAM BY PATHOLOGIST: CPT | Mod: 26 | Performed by: PATHOLOGY

## 2021-01-01 PROCEDURE — 99204 OFFICE O/P NEW MOD 45 MIN: CPT | Performed by: FAMILY MEDICINE

## 2021-01-01 PROCEDURE — 74176 CT ABD & PELVIS W/O CONTRAST: CPT

## 2021-01-01 PROCEDURE — 85610 PROTHROMBIN TIME: CPT

## 2021-01-01 PROCEDURE — A9585 GADOBUTROL INJECTION: HCPCS | Performed by: INTERNAL MEDICINE

## 2021-01-01 PROCEDURE — U0003 INFECTIOUS AGENT DETECTION BY NUCLEIC ACID (DNA OR RNA); SEVERE ACUTE RESPIRATORY SYNDROME CORONAVIRUS 2 (SARS-COV-2) (CORONAVIRUS DISEASE [COVID-19]), AMPLIFIED PROBE TECHNIQUE, MAKING USE OF HIGH THROUGHPUT TECHNOLOGIES AS DESCRIBED BY CMS-2020-01-R: HCPCS

## 2021-01-01 PROCEDURE — 85025 COMPLETE CBC W/AUTO DIFF WBC: CPT | Performed by: STUDENT IN AN ORGANIZED HEALTH CARE EDUCATION/TRAINING PROGRAM

## 2021-01-01 PROCEDURE — 93975 VASCULAR STUDY: CPT | Mod: 26 | Performed by: STUDENT IN AN ORGANIZED HEALTH CARE EDUCATION/TRAINING PROGRAM

## 2021-01-01 PROCEDURE — 49083 ABD PARACENTESIS W/IMAGING: CPT | Performed by: INTERNAL MEDICINE

## 2021-01-01 PROCEDURE — 80048 BASIC METABOLIC PNL TOTAL CA: CPT | Performed by: EMERGENCY MEDICINE

## 2021-01-01 PROCEDURE — 84443 ASSAY THYROID STIM HORMONE: CPT | Performed by: INTERNAL MEDICINE

## 2021-01-01 PROCEDURE — 82247 BILIRUBIN TOTAL: CPT

## 2021-01-01 PROCEDURE — 82040 ASSAY OF SERUM ALBUMIN: CPT | Performed by: EMERGENCY MEDICINE

## 2021-01-01 PROCEDURE — 83605 ASSAY OF LACTIC ACID: CPT | Performed by: INTERNAL MEDICINE

## 2021-01-01 PROCEDURE — 99285 EMERGENCY DEPT VISIT HI MDM: CPT | Performed by: EMERGENCY MEDICINE

## 2021-01-01 PROCEDURE — 82105 ALPHA-FETOPROTEIN SERUM: CPT

## 2021-01-01 PROCEDURE — 71045 X-RAY EXAM CHEST 1 VIEW: CPT | Mod: 26 | Performed by: RADIOLOGY

## 2021-01-01 PROCEDURE — 255N000002 HC RX 255 OP 636: Performed by: INTERNAL MEDICINE

## 2021-01-01 PROCEDURE — 99214 OFFICE O/P EST MOD 30 MIN: CPT | Mod: 95 | Performed by: PHYSICIAN ASSISTANT

## 2021-01-01 PROCEDURE — 85610 PROTHROMBIN TIME: CPT | Performed by: FAMILY MEDICINE

## 2021-01-01 PROCEDURE — 83690 ASSAY OF LIPASE: CPT | Performed by: EMERGENCY MEDICINE

## 2021-01-01 PROCEDURE — 88112 CYTOPATH CELL ENHANCE TECH: CPT | Mod: 26 | Performed by: PATHOLOGY

## 2021-01-01 PROCEDURE — C9803 HOPD COVID-19 SPEC COLLECT: HCPCS | Performed by: EMERGENCY MEDICINE

## 2021-01-01 PROCEDURE — 99000 SPECIMEN HANDLING OFFICE-LAB: CPT

## 2021-01-01 PROCEDURE — 88305 TISSUE EXAM BY PATHOLOGIST: CPT | Mod: TC

## 2021-01-01 PROCEDURE — 80053 COMPREHEN METABOLIC PANEL: CPT

## 2021-01-01 PROCEDURE — 84155 ASSAY OF PROTEIN SERUM: CPT

## 2021-01-01 PROCEDURE — 87075 CULTR BACTERIA EXCEPT BLOOD: CPT

## 2021-01-01 PROCEDURE — 999N000033 HC STATISTIC CHRONIC PULMONARY DISEASE SPECIALIST

## 2021-01-01 PROCEDURE — 99223 1ST HOSP IP/OBS HIGH 75: CPT | Mod: AI | Performed by: INTERNAL MEDICINE

## 2021-01-01 PROCEDURE — 250N000011 HC RX IP 250 OP 636: Performed by: EMERGENCY MEDICINE

## 2021-01-01 PROCEDURE — 93010 ELECTROCARDIOGRAM REPORT: CPT | Performed by: INTERNAL MEDICINE

## 2021-01-01 PROCEDURE — P9047 ALBUMIN (HUMAN), 25%, 50ML: HCPCS | Performed by: NURSE PRACTITIONER

## 2021-01-01 PROCEDURE — 99407 BEHAV CHNG SMOKING > 10 MIN: CPT

## 2021-01-01 PROCEDURE — 74178 CT ABD&PLV WO CNTR FLWD CNTR: CPT

## 2021-01-01 PROCEDURE — 258N000003 HC RX IP 258 OP 636

## 2021-01-01 PROCEDURE — 36415 COLL VENOUS BLD VENIPUNCTURE: CPT | Performed by: EMERGENCY MEDICINE

## 2021-01-01 PROCEDURE — 36415 COLL VENOUS BLD VENIPUNCTURE: CPT | Performed by: FAMILY MEDICINE

## 2021-01-01 PROCEDURE — 99223 1ST HOSP IP/OBS HIGH 75: CPT | Mod: GC | Performed by: STUDENT IN AN ORGANIZED HEALTH CARE EDUCATION/TRAINING PROGRAM

## 2021-01-01 PROCEDURE — 93005 ELECTROCARDIOGRAM TRACING: CPT

## 2021-01-01 PROCEDURE — 82040 ASSAY OF SERUM ALBUMIN: CPT

## 2021-01-01 PROCEDURE — 99205 OFFICE O/P NEW HI 60 MIN: CPT | Mod: 95 | Performed by: INTERNAL MEDICINE

## 2021-01-01 PROCEDURE — 99222 1ST HOSP IP/OBS MODERATE 55: CPT | Mod: GC | Performed by: INTERNAL MEDICINE

## 2021-01-01 PROCEDURE — 82803 BLOOD GASES ANY COMBINATION: CPT

## 2021-01-01 PROCEDURE — 93975 VASCULAR STUDY: CPT

## 2021-01-01 PROCEDURE — 83930 ASSAY OF BLOOD OSMOLALITY: CPT | Performed by: STUDENT IN AN ORGANIZED HEALTH CARE EDUCATION/TRAINING PROGRAM

## 2021-01-01 PROCEDURE — 85025 COMPLETE CBC W/AUTO DIFF WBC: CPT

## 2021-01-01 PROCEDURE — A9585 GADOBUTROL INJECTION: HCPCS | Mod: JW | Performed by: RADIOLOGY

## 2021-01-01 PROCEDURE — 81001 URINALYSIS AUTO W/SCOPE: CPT | Performed by: STUDENT IN AN ORGANIZED HEALTH CARE EDUCATION/TRAINING PROGRAM

## 2021-01-01 PROCEDURE — 250N000011 HC RX IP 250 OP 636: Performed by: INTERNAL MEDICINE

## 2021-01-01 PROCEDURE — 84100 ASSAY OF PHOSPHORUS: CPT

## 2021-01-01 PROCEDURE — 84300 ASSAY OF URINE SODIUM: CPT | Performed by: STUDENT IN AN ORGANIZED HEALTH CARE EDUCATION/TRAINING PROGRAM

## 2021-01-01 PROCEDURE — 85045 AUTOMATED RETICULOCYTE COUNT: CPT | Performed by: STUDENT IN AN ORGANIZED HEALTH CARE EDUCATION/TRAINING PROGRAM

## 2021-01-01 PROCEDURE — 71045 X-RAY EXAM CHEST 1 VIEW: CPT

## 2021-01-01 PROCEDURE — 81003 URINALYSIS AUTO W/O SCOPE: CPT | Performed by: INTERNAL MEDICINE

## 2021-01-01 PROCEDURE — 82105 ALPHA-FETOPROTEIN SERUM: CPT | Performed by: INTERNAL MEDICINE

## 2021-01-01 PROCEDURE — 99215 OFFICE O/P EST HI 40 MIN: CPT | Mod: 95 | Performed by: PHYSICIAN ASSISTANT

## 2021-01-01 PROCEDURE — 96417 CHEMO IV INFUS EACH ADDL SEQ: CPT

## 2021-01-01 PROCEDURE — 82040 ASSAY OF SERUM ALBUMIN: CPT | Performed by: INTERNAL MEDICINE

## 2021-01-01 PROCEDURE — 89050 BODY FLUID CELL COUNT: CPT

## 2021-01-01 PROCEDURE — 74183 MRI ABD W/O CNTR FLWD CNTR: CPT

## 2021-01-01 PROCEDURE — 43235 EGD DIAGNOSTIC BRUSH WASH: CPT

## 2021-01-01 PROCEDURE — 82374 ASSAY BLOOD CARBON DIOXIDE: CPT

## 2021-01-01 PROCEDURE — 96376 TX/PRO/DX INJ SAME DRUG ADON: CPT | Performed by: EMERGENCY MEDICINE

## 2021-01-01 PROCEDURE — 87040 BLOOD CULTURE FOR BACTERIA: CPT | Performed by: NURSE PRACTITIONER

## 2021-01-01 PROCEDURE — 82310 ASSAY OF CALCIUM: CPT

## 2021-01-01 PROCEDURE — 999N000216 HC STATISTIC ADULT CD FACE TO FACE-NO CHRG

## 2021-01-01 RX ORDER — METHADONE HYDROCHLORIDE 5 MG/5ML
2.5 SOLUTION ORAL EVERY 12 HOURS
Status: DISCONTINUED | OUTPATIENT
Start: 2021-01-01 | End: 2021-01-01

## 2021-01-01 RX ORDER — OMEPRAZOLE 40 MG/1
40 CAPSULE, DELAYED RELEASE ORAL DAILY
Qty: 90 CAPSULE | Refills: 0 | Status: SHIPPED | OUTPATIENT
Start: 2021-01-01 | End: 2021-12-23

## 2021-01-01 RX ORDER — PROCHLORPERAZINE MALEATE 10 MG
10 TABLET ORAL EVERY 6 HOURS PRN
Qty: 30 TABLET | Refills: 2 | Status: SHIPPED | OUTPATIENT
Start: 2021-01-01

## 2021-01-01 RX ORDER — LACTULOSE 10 G/15ML
10 SOLUTION ORAL 2 TIMES DAILY
Qty: 946 ML | Refills: 1 | Status: SHIPPED | OUTPATIENT
Start: 2021-01-01

## 2021-01-01 RX ORDER — NALOXONE HYDROCHLORIDE 0.4 MG/ML
0.4 INJECTION, SOLUTION INTRAMUSCULAR; INTRAVENOUS; SUBCUTANEOUS
Status: DISCONTINUED | OUTPATIENT
Start: 2021-01-01 | End: 2021-01-01

## 2021-01-01 RX ORDER — FLUMAZENIL 0.1 MG/ML
0.2 INJECTION, SOLUTION INTRAVENOUS
Status: ACTIVE | OUTPATIENT
Start: 2021-01-01 | End: 2021-01-01

## 2021-01-01 RX ORDER — ALBUMIN (HUMAN) 12.5 G/50ML
100 SOLUTION INTRAVENOUS ONCE
Status: COMPLETED | OUTPATIENT
Start: 2021-01-01 | End: 2021-01-01

## 2021-01-01 RX ORDER — HYDROMORPHONE HYDROCHLORIDE 1 MG/ML
0.5 INJECTION, SOLUTION INTRAMUSCULAR; INTRAVENOUS; SUBCUTANEOUS
Status: COMPLETED | OUTPATIENT
Start: 2021-01-01 | End: 2021-01-01

## 2021-01-01 RX ORDER — ALBUTEROL SULFATE 90 UG/1
1-2 AEROSOL, METERED RESPIRATORY (INHALATION)
Status: CANCELLED
Start: 2021-01-01

## 2021-01-01 RX ORDER — ATROPINE SULFATE 10 MG/ML
1-2 SOLUTION/ DROPS OPHTHALMIC 4 TIMES DAILY
Qty: 15 ML | Refills: 1 | Status: SHIPPED | OUTPATIENT
Start: 2021-01-01

## 2021-01-01 RX ORDER — ALBUTEROL SULFATE 0.83 MG/ML
2.5 SOLUTION RESPIRATORY (INHALATION)
Status: CANCELLED | OUTPATIENT
Start: 2021-01-01

## 2021-01-01 RX ORDER — LORAZEPAM 0.5 MG/1
.5-1 TABLET ORAL EVERY 4 HOURS PRN
Qty: 60 TABLET | Refills: 1 | Status: CANCELLED | OUTPATIENT
Start: 2021-01-01

## 2021-01-01 RX ORDER — ACETAMINOPHEN 650 MG/1
650 SUPPOSITORY RECTAL EVERY 4 HOURS PRN
Qty: 30 SUPPOSITORY | Refills: 1 | Status: SHIPPED | OUTPATIENT
Start: 2021-01-01

## 2021-01-01 RX ORDER — SPIRONOLACTONE 50 MG/1
50 TABLET, FILM COATED ORAL DAILY
Qty: 90 TABLET | Refills: 0 | Status: SHIPPED | OUTPATIENT
Start: 2021-01-01 | End: 2021-01-01

## 2021-01-01 RX ORDER — BACLOFEN 10 MG/1
5 TABLET ORAL 3 TIMES DAILY PRN
Status: DISCONTINUED | OUTPATIENT
Start: 2021-01-01 | End: 2021-01-01 | Stop reason: HOSPADM

## 2021-01-01 RX ORDER — FENTANYL CITRATE 50 UG/ML
INJECTION, SOLUTION INTRAMUSCULAR; INTRAVENOUS PRN
Status: DISCONTINUED | OUTPATIENT
Start: 2021-01-01 | End: 2021-01-01 | Stop reason: HOSPADM

## 2021-01-01 RX ORDER — PANTOPRAZOLE SODIUM 40 MG/1
40 TABLET, DELAYED RELEASE ORAL DAILY
Status: DISCONTINUED | OUTPATIENT
Start: 2021-01-01 | End: 2021-01-01 | Stop reason: HOSPADM

## 2021-01-01 RX ORDER — METHADONE HYDROCHLORIDE 5 MG/5ML
2.5 SOLUTION ORAL ONCE
Status: DISCONTINUED | OUTPATIENT
Start: 2021-01-01 | End: 2021-01-01 | Stop reason: RX

## 2021-01-01 RX ORDER — LACTULOSE 10 G/15ML
10 SOLUTION ORAL 2 TIMES DAILY
Status: DISCONTINUED | OUTPATIENT
Start: 2021-01-01 | End: 2021-01-01 | Stop reason: HOSPADM

## 2021-01-01 RX ORDER — NALOXONE HYDROCHLORIDE 0.4 MG/ML
0.2 INJECTION, SOLUTION INTRAMUSCULAR; INTRAVENOUS; SUBCUTANEOUS
Status: DISCONTINUED | OUTPATIENT
Start: 2021-01-01 | End: 2021-01-01 | Stop reason: HOSPADM

## 2021-01-01 RX ORDER — FENTANYL 12.5 UG/1
1 PATCH TRANSDERMAL
Qty: 10 PATCH | Refills: 0 | Status: SHIPPED | OUTPATIENT
Start: 2021-01-01

## 2021-01-01 RX ORDER — ALBUMIN (HUMAN) 12.5 G/50ML
150 SOLUTION INTRAVENOUS ONCE
Status: DISCONTINUED | OUTPATIENT
Start: 2021-01-01 | End: 2021-01-01

## 2021-01-01 RX ORDER — LIDOCAINE 40 MG/G
CREAM TOPICAL
Status: DISCONTINUED | OUTPATIENT
Start: 2021-01-01 | End: 2021-01-01 | Stop reason: HOSPADM

## 2021-01-01 RX ORDER — HYDROMORPHONE HYDROCHLORIDE 1 MG/ML
0.3 INJECTION, SOLUTION INTRAMUSCULAR; INTRAVENOUS; SUBCUTANEOUS EVERY 4 HOURS PRN
Status: DISCONTINUED | OUTPATIENT
Start: 2021-01-01 | End: 2021-01-01

## 2021-01-01 RX ORDER — METHADONE HYDROCHLORIDE 5 MG/5ML
2.5 SOLUTION ORAL EVERY 8 HOURS
Status: DISCONTINUED | OUTPATIENT
Start: 2021-01-01 | End: 2021-01-01

## 2021-01-01 RX ORDER — HEPARIN SODIUM (PORCINE) LOCK FLUSH IV SOLN 100 UNIT/ML 100 UNIT/ML
5 SOLUTION INTRAVENOUS
Status: CANCELLED | OUTPATIENT
Start: 2021-01-01

## 2021-01-01 RX ORDER — NALOXONE HYDROCHLORIDE 0.4 MG/ML
0.2 INJECTION, SOLUTION INTRAMUSCULAR; INTRAVENOUS; SUBCUTANEOUS
Status: CANCELLED | OUTPATIENT
Start: 2021-01-01

## 2021-01-01 RX ORDER — OXYCODONE HYDROCHLORIDE 5 MG/1
5-10 TABLET ORAL EVERY 4 HOURS PRN
Qty: 180 TABLET | Refills: 0 | Status: CANCELLED | OUTPATIENT
Start: 2021-01-01

## 2021-01-01 RX ORDER — HYDROMORPHONE HYDROCHLORIDE 1 MG/ML
0.5 INJECTION, SOLUTION INTRAMUSCULAR; INTRAVENOUS; SUBCUTANEOUS ONCE
Status: DISCONTINUED | OUTPATIENT
Start: 2021-01-01 | End: 2021-01-01 | Stop reason: CLARIF

## 2021-01-01 RX ORDER — NALOXONE HYDROCHLORIDE 0.4 MG/ML
0.4 INJECTION, SOLUTION INTRAMUSCULAR; INTRAVENOUS; SUBCUTANEOUS
Status: DISCONTINUED | OUTPATIENT
Start: 2021-01-01 | End: 2021-01-01 | Stop reason: HOSPADM

## 2021-01-01 RX ORDER — LACTULOSE 10 G/15ML
10 SOLUTION ORAL 3 TIMES DAILY
Status: DISCONTINUED | OUTPATIENT
Start: 2021-01-01 | End: 2021-01-01

## 2021-01-01 RX ORDER — SENNOSIDES 8.6 MG
8.6 TABLET ORAL 2 TIMES DAILY
Status: DISCONTINUED | OUTPATIENT
Start: 2021-01-01 | End: 2021-01-01 | Stop reason: HOSPADM

## 2021-01-01 RX ORDER — BACLOFEN 5 MG/1
5 TABLET ORAL 3 TIMES DAILY PRN
Qty: 90 TABLET | Refills: 1 | Status: SHIPPED | OUTPATIENT
Start: 2021-01-01

## 2021-01-01 RX ORDER — PIPERACILLIN SODIUM, TAZOBACTAM SODIUM 3; .375 G/15ML; G/15ML
3.38 INJECTION, POWDER, LYOPHILIZED, FOR SOLUTION INTRAVENOUS ONCE
Status: COMPLETED | OUTPATIENT
Start: 2021-01-01 | End: 2021-01-01

## 2021-01-01 RX ORDER — SODIUM CHLORIDE, SODIUM LACTATE, POTASSIUM CHLORIDE, CALCIUM CHLORIDE 600; 310; 30; 20 MG/100ML; MG/100ML; MG/100ML; MG/100ML
INJECTION, SOLUTION INTRAVENOUS CONTINUOUS
Status: CANCELLED | OUTPATIENT
Start: 2021-01-01

## 2021-01-01 RX ORDER — ONDANSETRON 2 MG/ML
4 INJECTION INTRAMUSCULAR; INTRAVENOUS
Status: DISCONTINUED | OUTPATIENT
Start: 2021-01-01 | End: 2021-01-01 | Stop reason: HOSPADM

## 2021-01-01 RX ORDER — BISACODYL 10 MG
10 SUPPOSITORY, RECTAL RECTAL DAILY PRN
Qty: 30 SUPPOSITORY | Refills: 1 | Status: SHIPPED | OUTPATIENT
Start: 2021-01-01

## 2021-01-01 RX ORDER — LORAZEPAM 0.5 MG/1
.5-1 TABLET ORAL EVERY 4 HOURS PRN
Qty: 60 TABLET | Refills: 1 | Status: SHIPPED | OUTPATIENT
Start: 2021-01-01

## 2021-01-01 RX ORDER — PROCHLORPERAZINE MALEATE 10 MG
10 TABLET ORAL EVERY 6 HOURS PRN
Status: DISCONTINUED | OUTPATIENT
Start: 2021-01-01 | End: 2021-01-01 | Stop reason: HOSPADM

## 2021-01-01 RX ORDER — NICOTINE 21 MG/24HR
1 PATCH, TRANSDERMAL 24 HOURS TRANSDERMAL DAILY
Status: DISCONTINUED | OUTPATIENT
Start: 2021-01-01 | End: 2021-01-01 | Stop reason: HOSPADM

## 2021-01-01 RX ORDER — OXYCODONE HYDROCHLORIDE 5 MG/1
5 TABLET ORAL EVERY 4 HOURS PRN
Status: DISCONTINUED | OUTPATIENT
Start: 2021-01-01 | End: 2021-01-01

## 2021-01-01 RX ORDER — SPIRONOLACTONE 25 MG/1
100 TABLET ORAL DAILY
Status: DISCONTINUED | OUTPATIENT
Start: 2021-01-01 | End: 2021-01-01 | Stop reason: HOSPADM

## 2021-01-01 RX ORDER — ONDANSETRON 2 MG/ML
4 INJECTION INTRAMUSCULAR; INTRAVENOUS EVERY 6 HOURS PRN
Status: DISCONTINUED | OUTPATIENT
Start: 2021-01-01 | End: 2021-01-01 | Stop reason: HOSPADM

## 2021-01-01 RX ORDER — HYDROMORPHONE HYDROCHLORIDE 1 MG/ML
0.3 INJECTION, SOLUTION INTRAMUSCULAR; INTRAVENOUS; SUBCUTANEOUS
Status: DISCONTINUED | OUTPATIENT
Start: 2021-01-01 | End: 2021-01-01 | Stop reason: HOSPADM

## 2021-01-01 RX ORDER — ALBUMIN (HUMAN) 12.5 G/50ML
12.5 SOLUTION INTRAVENOUS ONCE
Status: COMPLETED | OUTPATIENT
Start: 2021-01-01 | End: 2021-01-01

## 2021-01-01 RX ORDER — EPINEPHRINE 1 MG/ML
0.3 INJECTION, SOLUTION INTRAMUSCULAR; SUBCUTANEOUS EVERY 5 MIN PRN
Status: CANCELLED | OUTPATIENT
Start: 2021-01-01

## 2021-01-01 RX ORDER — LORAZEPAM 0.5 MG/1
0.5 TABLET ORAL EVERY 4 HOURS PRN
Qty: 30 TABLET | Refills: 2 | Status: ON HOLD | OUTPATIENT
Start: 2021-01-01 | End: 2021-01-01

## 2021-01-01 RX ORDER — GADOBUTROL 604.72 MG/ML
10 INJECTION INTRAVENOUS ONCE
Status: COMPLETED | OUTPATIENT
Start: 2021-01-01 | End: 2021-01-01

## 2021-01-01 RX ORDER — ACETAMINOPHEN 500 MG
500-1000 TABLET ORAL EVERY 6 HOURS PRN
COMMUNITY
End: 2021-01-01

## 2021-01-01 RX ORDER — SIMETHICONE
LIQUID (ML) MISCELLANEOUS PRN
Status: DISCONTINUED | OUTPATIENT
Start: 2021-01-01 | End: 2021-01-01 | Stop reason: HOSPADM

## 2021-01-01 RX ORDER — LORAZEPAM 1 MG/1
1 TABLET ORAL
Status: DISCONTINUED | OUTPATIENT
Start: 2021-01-01 | End: 2021-01-01 | Stop reason: HOSPADM

## 2021-01-01 RX ORDER — OXYCODONE HYDROCHLORIDE 5 MG/1
5-10 TABLET ORAL EVERY 4 HOURS PRN
Status: DISCONTINUED | OUTPATIENT
Start: 2021-01-01 | End: 2021-01-01 | Stop reason: HOSPADM

## 2021-01-01 RX ORDER — ONDANSETRON 2 MG/ML
4 INJECTION INTRAMUSCULAR; INTRAVENOUS EVERY 30 MIN PRN
Status: DISCONTINUED | OUTPATIENT
Start: 2021-01-01 | End: 2021-01-01

## 2021-01-01 RX ORDER — FUROSEMIDE 20 MG
20 TABLET ORAL DAILY
Qty: 90 TABLET | Refills: 0 | Status: SHIPPED | OUTPATIENT
Start: 2021-01-01 | End: 2021-01-01

## 2021-01-01 RX ORDER — HALOPERIDOL 1 MG/1
1-2 TABLET ORAL EVERY 6 HOURS PRN
Qty: 90 TABLET | Refills: 1 | Status: SHIPPED | OUTPATIENT
Start: 2021-01-01

## 2021-01-01 RX ORDER — IOPAMIDOL 755 MG/ML
120 INJECTION, SOLUTION INTRAVASCULAR ONCE
Status: COMPLETED | OUTPATIENT
Start: 2021-01-01 | End: 2021-01-01

## 2021-01-01 RX ORDER — OXYCODONE HYDROCHLORIDE 5 MG/1
5-10 TABLET ORAL
Qty: 120 TABLET | Refills: 0 | Status: SHIPPED | OUTPATIENT
Start: 2021-01-01

## 2021-01-01 RX ORDER — DIPHENHYDRAMINE HYDROCHLORIDE 50 MG/ML
50 INJECTION INTRAMUSCULAR; INTRAVENOUS
Status: CANCELLED
Start: 2021-01-01

## 2021-01-01 RX ORDER — SENNA AND DOCUSATE SODIUM 50; 8.6 MG/1; MG/1
1-2 TABLET, FILM COATED ORAL DAILY PRN
Qty: 60 TABLET | Refills: 1 | Status: SHIPPED | OUTPATIENT
Start: 2021-01-01

## 2021-01-01 RX ORDER — LORAZEPAM 2 MG/ML
0.5 INJECTION INTRAMUSCULAR EVERY 4 HOURS PRN
Status: CANCELLED | OUTPATIENT
Start: 2021-01-01

## 2021-01-01 RX ORDER — GADOBUTROL 604.72 MG/ML
8 INJECTION INTRAVENOUS ONCE
Status: COMPLETED | OUTPATIENT
Start: 2021-01-01 | End: 2021-01-01

## 2021-01-01 RX ORDER — NALOXONE HYDROCHLORIDE 0.4 MG/ML
0.2 INJECTION, SOLUTION INTRAMUSCULAR; INTRAVENOUS; SUBCUTANEOUS
Status: DISCONTINUED | OUTPATIENT
Start: 2021-01-01 | End: 2021-01-01

## 2021-01-01 RX ORDER — LORAZEPAM 2 MG/ML
1 INJECTION INTRAMUSCULAR
Status: DISCONTINUED | OUTPATIENT
Start: 2021-01-01 | End: 2021-01-01 | Stop reason: HOSPADM

## 2021-01-01 RX ORDER — MORPHINE SULFATE 4 MG/ML
4 INJECTION, SOLUTION INTRAMUSCULAR; INTRAVENOUS
Status: DISCONTINUED | OUTPATIENT
Start: 2021-01-01 | End: 2021-01-01

## 2021-01-01 RX ORDER — ONDANSETRON 4 MG/1
4 TABLET, ORALLY DISINTEGRATING ORAL EVERY 6 HOURS PRN
Status: DISCONTINUED | OUTPATIENT
Start: 2021-01-01 | End: 2021-01-01 | Stop reason: HOSPADM

## 2021-01-01 RX ORDER — HEPARIN SODIUM,PORCINE 10 UNIT/ML
5 VIAL (ML) INTRAVENOUS
Status: CANCELLED | OUTPATIENT
Start: 2021-01-01

## 2021-01-01 RX ORDER — PIPERACILLIN SODIUM, TAZOBACTAM SODIUM 2; .25 G/10ML; G/10ML
2.25 INJECTION, POWDER, LYOPHILIZED, FOR SOLUTION INTRAVENOUS EVERY 6 HOURS
Status: DISCONTINUED | OUTPATIENT
Start: 2021-01-01 | End: 2021-01-01

## 2021-01-01 RX ORDER — FENTANYL 12.5 UG/1
12 PATCH TRANSDERMAL
Status: DISCONTINUED | OUTPATIENT
Start: 2021-01-01 | End: 2021-01-01 | Stop reason: HOSPADM

## 2021-01-01 RX ORDER — MEPERIDINE HYDROCHLORIDE 25 MG/ML
25 INJECTION INTRAMUSCULAR; INTRAVENOUS; SUBCUTANEOUS EVERY 30 MIN PRN
Status: CANCELLED | OUTPATIENT
Start: 2021-01-01

## 2021-01-01 RX ORDER — FUROSEMIDE 40 MG
40 TABLET ORAL DAILY
Status: DISCONTINUED | OUTPATIENT
Start: 2021-01-01 | End: 2021-01-01 | Stop reason: HOSPADM

## 2021-01-01 RX ORDER — ZINC SULFATE 50(220)MG
220 CAPSULE ORAL 2 TIMES DAILY
Qty: 60 CAPSULE | Refills: 2 | Status: ON HOLD | OUTPATIENT
Start: 2021-01-01 | End: 2021-01-01

## 2021-01-01 RX ORDER — LANOLIN ALCOHOL/MO/W.PET/CERES
3 CREAM (GRAM) TOPICAL
Status: DISCONTINUED | OUTPATIENT
Start: 2021-01-01 | End: 2021-01-01 | Stop reason: HOSPADM

## 2021-01-01 RX ORDER — OXYCODONE HYDROCHLORIDE 5 MG/1
5 TABLET ORAL EVERY 6 HOURS PRN
Status: DISCONTINUED | OUTPATIENT
Start: 2021-01-01 | End: 2021-01-01

## 2021-01-01 RX ORDER — METHYLPREDNISOLONE SODIUM SUCCINATE 125 MG/2ML
125 INJECTION, POWDER, LYOPHILIZED, FOR SOLUTION INTRAMUSCULAR; INTRAVENOUS
Status: CANCELLED
Start: 2021-01-01

## 2021-01-01 RX ORDER — NALOXONE HYDROCHLORIDE 0.4 MG/ML
0.1 INJECTION, SOLUTION INTRAMUSCULAR; INTRAVENOUS; SUBCUTANEOUS
Status: DISCONTINUED | OUTPATIENT
Start: 2021-01-01 | End: 2021-01-01 | Stop reason: HOSPADM

## 2021-01-01 RX ORDER — LORAZEPAM 0.5 MG/1
1 TABLET ORAL EVERY 4 HOURS PRN
Qty: 90 TABLET | Refills: 1 | Status: CANCELLED | OUTPATIENT
Start: 2021-01-01

## 2021-01-01 RX ORDER — LACTULOSE 10 G/15ML
10 SOLUTION ORAL DAILY
Status: DISCONTINUED | OUTPATIENT
Start: 2021-01-01 | End: 2021-01-01

## 2021-01-01 RX ORDER — OXYCODONE HYDROCHLORIDE 10 MG/1
10 TABLET ORAL EVERY 4 HOURS PRN
Status: DISCONTINUED | OUTPATIENT
Start: 2021-01-01 | End: 2021-01-01

## 2021-01-01 RX ADMIN — LACTULOSE 10 G: 20 SOLUTION ORAL at 20:28

## 2021-01-01 RX ADMIN — HYDROMORPHONE HYDROCHLORIDE 0.3 MG: 1 INJECTION, SOLUTION INTRAMUSCULAR; INTRAVENOUS; SUBCUTANEOUS at 19:00

## 2021-01-01 RX ADMIN — NICOTINE 1 PATCH: 14 PATCH, EXTENDED RELEASE TRANSDERMAL at 08:29

## 2021-01-01 RX ADMIN — LACTULOSE 10 G: 20 SOLUTION ORAL at 07:47

## 2021-01-01 RX ADMIN — OXYCODONE HYDROCHLORIDE 10 MG: 5 TABLET ORAL at 22:44

## 2021-01-01 RX ADMIN — ALBUMIN HUMAN 100 G: 0.25 SOLUTION INTRAVENOUS at 11:02

## 2021-01-01 RX ADMIN — LACTULOSE 10 G: 20 SOLUTION ORAL at 08:46

## 2021-01-01 RX ADMIN — PIPERACILLIN SODIUM AND TAZOBACTAM SODIUM 2.25 G: 2; .25 INJECTION, POWDER, LYOPHILIZED, FOR SOLUTION INTRAVENOUS at 05:43

## 2021-01-01 RX ADMIN — FUROSEMIDE 40 MG: 40 TABLET ORAL at 08:33

## 2021-01-01 RX ADMIN — SENNOSIDES 8.6 MG: 8.6 TABLET ORAL at 20:28

## 2021-01-01 RX ADMIN — PIPERACILLIN SODIUM AND TAZOBACTAM SODIUM 2.25 G: 2; .25 INJECTION, POWDER, LYOPHILIZED, FOR SOLUTION INTRAVENOUS at 18:41

## 2021-01-01 RX ADMIN — NICOTINE 1 PATCH: 14 PATCH, EXTENDED RELEASE TRANSDERMAL at 08:47

## 2021-01-01 RX ADMIN — LACTULOSE 10 G: 20 SOLUTION ORAL at 21:00

## 2021-01-01 RX ADMIN — OXYCODONE HYDROCHLORIDE 10 MG: 5 TABLET ORAL at 14:33

## 2021-01-01 RX ADMIN — PIPERACILLIN SODIUM AND TAZOBACTAM SODIUM 2.25 G: 2; .25 INJECTION, POWDER, LYOPHILIZED, FOR SOLUTION INTRAVENOUS at 23:20

## 2021-01-01 RX ADMIN — PIPERACILLIN SODIUM AND TAZOBACTAM SODIUM 2.25 G: 2; .25 INJECTION, POWDER, LYOPHILIZED, FOR SOLUTION INTRAVENOUS at 11:53

## 2021-01-01 RX ADMIN — PANTOPRAZOLE SODIUM 40 MG: 40 TABLET, DELAYED RELEASE ORAL at 05:43

## 2021-01-01 RX ADMIN — OXYCODONE HYDROCHLORIDE 10 MG: 5 TABLET ORAL at 04:30

## 2021-01-01 RX ADMIN — LACTULOSE 10 G: 20 SOLUTION ORAL at 21:09

## 2021-01-01 RX ADMIN — LACTULOSE 10 G: 20 SOLUTION ORAL at 21:41

## 2021-01-01 RX ADMIN — PIPERACILLIN SODIUM AND TAZOBACTAM SODIUM 2.25 G: 2; .25 INJECTION, POWDER, LYOPHILIZED, FOR SOLUTION INTRAVENOUS at 00:12

## 2021-01-01 RX ADMIN — HYDROMORPHONE HYDROCHLORIDE 0.3 MG: 1 INJECTION, SOLUTION INTRAMUSCULAR; INTRAVENOUS; SUBCUTANEOUS at 14:26

## 2021-01-01 RX ADMIN — SENNOSIDES 8.6 MG: 8.6 TABLET ORAL at 10:50

## 2021-01-01 RX ADMIN — HYDROMORPHONE HYDROCHLORIDE 0.3 MG: 1 INJECTION, SOLUTION INTRAMUSCULAR; INTRAVENOUS; SUBCUTANEOUS at 02:45

## 2021-01-01 RX ADMIN — NICOTINE 1 PATCH: 14 PATCH, EXTENDED RELEASE TRANSDERMAL at 08:50

## 2021-01-01 RX ADMIN — HYDROMORPHONE HYDROCHLORIDE 0.3 MG: 1 INJECTION, SOLUTION INTRAMUSCULAR; INTRAVENOUS; SUBCUTANEOUS at 08:47

## 2021-01-01 RX ADMIN — PIPERACILLIN SODIUM AND TAZOBACTAM SODIUM 2.25 G: 2; .25 INJECTION, POWDER, LYOPHILIZED, FOR SOLUTION INTRAVENOUS at 23:00

## 2021-01-01 RX ADMIN — LACTULOSE 10 G: 20 SOLUTION ORAL at 14:33

## 2021-01-01 RX ADMIN — NICOTINE 1 PATCH: 14 PATCH, EXTENDED RELEASE TRANSDERMAL at 20:59

## 2021-01-01 RX ADMIN — OXYCODONE HYDROCHLORIDE 5 MG: 5 TABLET ORAL at 19:45

## 2021-01-01 RX ADMIN — SPIRONOLACTONE 100 MG: 25 TABLET, FILM COATED ORAL at 13:23

## 2021-01-01 RX ADMIN — SODIUM CHLORIDE 50 ML: 9 INJECTION, SOLUTION INTRAVENOUS at 14:37

## 2021-01-01 RX ADMIN — PIPERACILLIN SODIUM AND TAZOBACTAM SODIUM 2.25 G: 2; .25 INJECTION, POWDER, LYOPHILIZED, FOR SOLUTION INTRAVENOUS at 17:51

## 2021-01-01 RX ADMIN — PIPERACILLIN SODIUM AND TAZOBACTAM SODIUM 2.25 G: 2; .25 INJECTION, POWDER, LYOPHILIZED, FOR SOLUTION INTRAVENOUS at 07:05

## 2021-01-01 RX ADMIN — HYDROMORPHONE HYDROCHLORIDE 0.5 MG: 1 INJECTION, SOLUTION INTRAMUSCULAR; INTRAVENOUS; SUBCUTANEOUS at 08:45

## 2021-01-01 RX ADMIN — PANTOPRAZOLE SODIUM 40 MG: 40 TABLET, DELAYED RELEASE ORAL at 08:44

## 2021-01-01 RX ADMIN — LACTULOSE 10 G: 20 SOLUTION ORAL at 08:50

## 2021-01-01 RX ADMIN — HYDROMORPHONE HYDROCHLORIDE 0.3 MG: 1 INJECTION, SOLUTION INTRAMUSCULAR; INTRAVENOUS; SUBCUTANEOUS at 16:39

## 2021-01-01 RX ADMIN — SPIRONOLACTONE 100 MG: 25 TABLET, FILM COATED ORAL at 08:32

## 2021-01-01 RX ADMIN — LACTULOSE 10 G: 20 SOLUTION ORAL at 08:41

## 2021-01-01 RX ADMIN — PIPERACILLIN SODIUM AND TAZOBACTAM SODIUM 2.25 G: 2; .25 INJECTION, POWDER, LYOPHILIZED, FOR SOLUTION INTRAVENOUS at 11:39

## 2021-01-01 RX ADMIN — PIPERACILLIN SODIUM AND TAZOBACTAM SODIUM 2.25 G: 2; .25 INJECTION, POWDER, LYOPHILIZED, FOR SOLUTION INTRAVENOUS at 18:35

## 2021-01-01 RX ADMIN — NICOTINE 1 PATCH: 14 PATCH, EXTENDED RELEASE TRANSDERMAL at 08:53

## 2021-01-01 RX ADMIN — LACTULOSE 10 G: 20 SOLUTION ORAL at 08:31

## 2021-01-01 RX ADMIN — OXYCODONE HYDROCHLORIDE 10 MG: 5 TABLET ORAL at 09:06

## 2021-01-01 RX ADMIN — HYDROMORPHONE HYDROCHLORIDE 0.3 MG: 1 INJECTION, SOLUTION INTRAMUSCULAR; INTRAVENOUS; SUBCUTANEOUS at 23:47

## 2021-01-01 RX ADMIN — HYDROMORPHONE HYDROCHLORIDE 0.5 MG: 1 INJECTION, SOLUTION INTRAMUSCULAR; INTRAVENOUS; SUBCUTANEOUS at 07:21

## 2021-01-01 RX ADMIN — PANTOPRAZOLE SODIUM 40 MG: 40 TABLET, DELAYED RELEASE ORAL at 07:47

## 2021-01-01 RX ADMIN — Medication 5 MG: at 18:24

## 2021-01-01 RX ADMIN — GADOBUTROL 9.5 ML: 604.72 INJECTION INTRAVENOUS at 14:12

## 2021-01-01 RX ADMIN — OXYCODONE HYDROCHLORIDE 5 MG: 5 TABLET ORAL at 11:30

## 2021-01-01 RX ADMIN — PIPERACILLIN SODIUM AND TAZOBACTAM SODIUM 2.25 G: 2; .25 INJECTION, POWDER, LYOPHILIZED, FOR SOLUTION INTRAVENOUS at 13:09

## 2021-01-01 RX ADMIN — HYDROMORPHONE HYDROCHLORIDE 0.3 MG: 1 INJECTION, SOLUTION INTRAMUSCULAR; INTRAVENOUS; SUBCUTANEOUS at 20:35

## 2021-01-01 RX ADMIN — PIPERACILLIN SODIUM AND TAZOBACTAM SODIUM 2.25 G: 2; .25 INJECTION, POWDER, LYOPHILIZED, FOR SOLUTION INTRAVENOUS at 00:04

## 2021-01-01 RX ADMIN — ALBUMIN HUMAN 100 G: 0.25 SOLUTION INTRAVENOUS at 17:45

## 2021-01-01 RX ADMIN — SODIUM CHLORIDE 1200 MG: 9 INJECTION, SOLUTION INTRAVENOUS at 14:16

## 2021-01-01 RX ADMIN — PANTOPRAZOLE SODIUM 40 MG: 40 TABLET, DELAYED RELEASE ORAL at 08:49

## 2021-01-01 RX ADMIN — HYDROMORPHONE HYDROCHLORIDE 0.3 MG: 1 INJECTION, SOLUTION INTRAMUSCULAR; INTRAVENOUS; SUBCUTANEOUS at 11:57

## 2021-01-01 RX ADMIN — ATEZOLIZUMAB 1200 MG: 1200 INJECTION, SOLUTION INTRAVENOUS at 14:55

## 2021-01-01 RX ADMIN — OXYCODONE HYDROCHLORIDE 5 MG: 5 TABLET ORAL at 05:14

## 2021-01-01 RX ADMIN — OXYCODONE HYDROCHLORIDE 10 MG: 5 TABLET ORAL at 07:56

## 2021-01-01 RX ADMIN — HYDROMORPHONE HYDROCHLORIDE 0.5 MG: 1 INJECTION, SOLUTION INTRAMUSCULAR; INTRAVENOUS; SUBCUTANEOUS at 16:05

## 2021-01-01 RX ADMIN — OXYCODONE HYDROCHLORIDE 10 MG: 5 TABLET ORAL at 18:24

## 2021-01-01 RX ADMIN — PANTOPRAZOLE SODIUM 40 MG: 40 TABLET, DELAYED RELEASE ORAL at 08:30

## 2021-01-01 RX ADMIN — FUROSEMIDE 40 MG: 40 TABLET ORAL at 08:45

## 2021-01-01 RX ADMIN — LACTULOSE 10 G: 20 SOLUTION ORAL at 19:58

## 2021-01-01 RX ADMIN — PIPERACILLIN SODIUM AND TAZOBACTAM SODIUM 2.25 G: 2; .25 INJECTION, POWDER, LYOPHILIZED, FOR SOLUTION INTRAVENOUS at 11:33

## 2021-01-01 RX ADMIN — NICOTINE 1 PATCH: 14 PATCH, EXTENDED RELEASE TRANSDERMAL at 07:55

## 2021-01-01 RX ADMIN — ALBUMIN HUMAN 100 G: 0.25 SOLUTION INTRAVENOUS at 08:15

## 2021-01-01 RX ADMIN — OXYCODONE HYDROCHLORIDE 5 MG: 5 TABLET ORAL at 22:38

## 2021-01-01 RX ADMIN — PIPERACILLIN SODIUM AND TAZOBACTAM SODIUM 2.25 G: 2; .25 INJECTION, POWDER, LYOPHILIZED, FOR SOLUTION INTRAVENOUS at 06:23

## 2021-01-01 RX ADMIN — SENNOSIDES 8.6 MG: 8.6 TABLET ORAL at 07:47

## 2021-01-01 RX ADMIN — OXYCODONE HYDROCHLORIDE 10 MG: 10 TABLET ORAL at 13:07

## 2021-01-01 RX ADMIN — OXYCODONE HYDROCHLORIDE 10 MG: 5 TABLET ORAL at 13:07

## 2021-01-01 RX ADMIN — PIPERACILLIN SODIUM AND TAZOBACTAM SODIUM 2.25 G: 2; .25 INJECTION, POWDER, LYOPHILIZED, FOR SOLUTION INTRAVENOUS at 00:14

## 2021-01-01 RX ADMIN — OXYCODONE HYDROCHLORIDE 10 MG: 5 TABLET ORAL at 23:51

## 2021-01-01 RX ADMIN — LACTULOSE 10 G: 20 SOLUTION ORAL at 08:27

## 2021-01-01 RX ADMIN — PIPERACILLIN SODIUM AND TAZOBACTAM SODIUM 2.25 G: 2; .25 INJECTION, POWDER, LYOPHILIZED, FOR SOLUTION INTRAVENOUS at 11:27

## 2021-01-01 RX ADMIN — OXYCODONE HYDROCHLORIDE 10 MG: 5 TABLET ORAL at 03:32

## 2021-01-01 RX ADMIN — SENNOSIDES 8.6 MG: 8.6 TABLET ORAL at 08:45

## 2021-01-01 RX ADMIN — SODIUM CHLORIDE 250 ML: 9 INJECTION, SOLUTION INTRAVENOUS at 14:16

## 2021-01-01 RX ADMIN — PIPERACILLIN SODIUM AND TAZOBACTAM SODIUM 3.38 G: 3; .375 INJECTION, POWDER, LYOPHILIZED, FOR SOLUTION INTRAVENOUS at 18:48

## 2021-01-01 RX ADMIN — OXYCODONE HYDROCHLORIDE 10 MG: 5 TABLET ORAL at 09:40

## 2021-01-01 RX ADMIN — PIPERACILLIN SODIUM AND TAZOBACTAM SODIUM 2.25 G: 2; .25 INJECTION, POWDER, LYOPHILIZED, FOR SOLUTION INTRAVENOUS at 06:22

## 2021-01-01 RX ADMIN — OXYCODONE HYDROCHLORIDE 10 MG: 5 TABLET ORAL at 18:48

## 2021-01-01 RX ADMIN — OXYCODONE HYDROCHLORIDE 5 MG: 5 TABLET ORAL at 08:28

## 2021-01-01 RX ADMIN — PIPERACILLIN SODIUM AND TAZOBACTAM SODIUM 2.25 G: 2; .25 INJECTION, POWDER, LYOPHILIZED, FOR SOLUTION INTRAVENOUS at 05:18

## 2021-01-01 RX ADMIN — IOPAMIDOL 120 ML: 755 INJECTION, SOLUTION INTRAVENOUS at 15:30

## 2021-01-01 RX ADMIN — PIPERACILLIN SODIUM AND TAZOBACTAM SODIUM 2.25 G: 2; .25 INJECTION, POWDER, LYOPHILIZED, FOR SOLUTION INTRAVENOUS at 06:52

## 2021-01-01 RX ADMIN — LACTULOSE 10 G: 20 SOLUTION ORAL at 08:29

## 2021-01-01 RX ADMIN — PIPERACILLIN SODIUM AND TAZOBACTAM SODIUM 2.25 G: 2; .25 INJECTION, POWDER, LYOPHILIZED, FOR SOLUTION INTRAVENOUS at 18:23

## 2021-01-01 RX ADMIN — OXYCODONE HYDROCHLORIDE 10 MG: 5 TABLET ORAL at 05:07

## 2021-01-01 RX ADMIN — Medication 2.5 MG: at 13:24

## 2021-01-01 RX ADMIN — PANTOPRAZOLE SODIUM 40 MG: 40 TABLET, DELAYED RELEASE ORAL at 11:03

## 2021-01-01 RX ADMIN — PIPERACILLIN SODIUM AND TAZOBACTAM SODIUM 2.25 G: 2; .25 INJECTION, POWDER, LYOPHILIZED, FOR SOLUTION INTRAVENOUS at 17:47

## 2021-01-01 RX ADMIN — SPIRONOLACTONE 100 MG: 25 TABLET, FILM COATED ORAL at 08:44

## 2021-01-01 RX ADMIN — OXYCODONE HYDROCHLORIDE 10 MG: 5 TABLET ORAL at 09:24

## 2021-01-01 RX ADMIN — PHYTONADIONE 5 MG: 10 INJECTION, EMULSION INTRAMUSCULAR; INTRAVENOUS; SUBCUTANEOUS at 08:42

## 2021-01-01 RX ADMIN — LACTULOSE 10 G: 20 SOLUTION ORAL at 07:55

## 2021-01-01 RX ADMIN — Medication 5 MG: at 13:30

## 2021-01-01 RX ADMIN — SENNOSIDES 8.6 MG: 8.6 TABLET ORAL at 08:33

## 2021-01-01 RX ADMIN — OXYCODONE HYDROCHLORIDE 10 MG: 5 TABLET ORAL at 18:59

## 2021-01-01 RX ADMIN — METHADONE HYDROCHLORIDE 2.5 MG: 5 SOLUTION ORAL at 20:28

## 2021-01-01 RX ADMIN — PHYTONADIONE 5 MG: 10 INJECTION, EMULSION INTRAMUSCULAR; INTRAVENOUS; SUBCUTANEOUS at 07:55

## 2021-01-01 RX ADMIN — NICOTINE 1 PATCH: 14 PATCH, EXTENDED RELEASE TRANSDERMAL at 07:47

## 2021-01-01 RX ADMIN — LACTULOSE 10 G: 20 SOLUTION ORAL at 19:40

## 2021-01-01 RX ADMIN — PANTOPRAZOLE SODIUM 40 MG: 40 TABLET, DELAYED RELEASE ORAL at 08:33

## 2021-01-01 RX ADMIN — FENTANYL 1 PATCH: 12 PATCH, EXTENDED RELEASE TRANSDERMAL at 11:54

## 2021-01-01 RX ADMIN — PIPERACILLIN SODIUM AND TAZOBACTAM SODIUM 2.25 G: 2; .25 INJECTION, POWDER, LYOPHILIZED, FOR SOLUTION INTRAVENOUS at 11:20

## 2021-01-01 RX ADMIN — NICOTINE 1 PATCH: 14 PATCH, EXTENDED RELEASE TRANSDERMAL at 08:40

## 2021-01-01 RX ADMIN — HYDROMORPHONE HYDROCHLORIDE 0.3 MG: 1 INJECTION, SOLUTION INTRAMUSCULAR; INTRAVENOUS; SUBCUTANEOUS at 04:24

## 2021-01-01 RX ADMIN — PANTOPRAZOLE SODIUM 40 MG: 40 TABLET, DELAYED RELEASE ORAL at 07:55

## 2021-01-01 RX ADMIN — ALBUMIN HUMAN 12.5 G: 0.25 SOLUTION INTRAVENOUS at 17:55

## 2021-01-01 RX ADMIN — OXYCODONE HYDROCHLORIDE 10 MG: 5 TABLET ORAL at 08:47

## 2021-01-01 RX ADMIN — FUROSEMIDE 40 MG: 40 TABLET ORAL at 13:24

## 2021-01-01 RX ADMIN — PHYTONADIONE 5 MG: 10 INJECTION, EMULSION INTRAMUSCULAR; INTRAVENOUS; SUBCUTANEOUS at 20:59

## 2021-01-01 RX ADMIN — SENNOSIDES 8.6 MG: 8.6 TABLET ORAL at 21:42

## 2021-01-01 RX ADMIN — OXYCODONE HYDROCHLORIDE 5 MG: 5 TABLET ORAL at 04:04

## 2021-01-01 RX ADMIN — PIPERACILLIN SODIUM AND TAZOBACTAM SODIUM 2.25 G: 2; .25 INJECTION, POWDER, LYOPHILIZED, FOR SOLUTION INTRAVENOUS at 18:58

## 2021-01-01 RX ADMIN — OXYCODONE HYDROCHLORIDE 5 MG: 5 TABLET ORAL at 15:35

## 2021-01-01 RX ADMIN — OXYCODONE HYDROCHLORIDE 10 MG: 5 TABLET ORAL at 22:25

## 2021-01-01 RX ADMIN — OXYCODONE HYDROCHLORIDE 5 MG: 5 TABLET ORAL at 00:04

## 2021-01-01 RX ADMIN — PIPERACILLIN SODIUM AND TAZOBACTAM SODIUM 2.25 G: 2; .25 INJECTION, POWDER, LYOPHILIZED, FOR SOLUTION INTRAVENOUS at 11:50

## 2021-01-01 RX ADMIN — PANTOPRAZOLE SODIUM 40 MG: 40 TABLET, DELAYED RELEASE ORAL at 08:41

## 2021-01-01 RX ADMIN — PIPERACILLIN SODIUM AND TAZOBACTAM SODIUM 2.25 G: 2; .25 INJECTION, POWDER, LYOPHILIZED, FOR SOLUTION INTRAVENOUS at 05:15

## 2021-01-01 RX ADMIN — PIPERACILLIN SODIUM AND TAZOBACTAM SODIUM 2.25 G: 2; .25 INJECTION, POWDER, LYOPHILIZED, FOR SOLUTION INTRAVENOUS at 00:17

## 2021-01-01 RX ADMIN — GADOBUTROL 8 ML: 604.72 INJECTION INTRAVENOUS at 14:37

## 2021-01-01 RX ADMIN — OXYCODONE HYDROCHLORIDE 10 MG: 5 TABLET ORAL at 18:08

## 2021-01-01 RX ADMIN — OXYCODONE HYDROCHLORIDE 10 MG: 5 TABLET ORAL at 01:35

## 2021-01-01 ASSESSMENT — ACTIVITIES OF DAILY LIVING (ADL)
ADLS_ACUITY_SCORE: 11
ADLS_ACUITY_SCORE: 11
ADLS_ACUITY_SCORE: 12
ADLS_ACUITY_SCORE: 11
ADLS_ACUITY_SCORE: 13
ADLS_ACUITY_SCORE: 11
ADLS_ACUITY_SCORE: 12
ADLS_ACUITY_SCORE: 9
ADLS_ACUITY_SCORE: 12
ADLS_ACUITY_SCORE: 6
ADLS_ACUITY_SCORE: 13
ADLS_ACUITY_SCORE: 11
ADLS_ACUITY_SCORE: 12
ADLS_ACUITY_SCORE: 11
ADLS_ACUITY_SCORE: 6
ADLS_ACUITY_SCORE: 13
ADLS_ACUITY_SCORE: 13
ADLS_ACUITY_SCORE: 9
ADLS_ACUITY_SCORE: 11
ADLS_ACUITY_SCORE: 13
ADLS_ACUITY_SCORE: 11
ADLS_ACUITY_SCORE: 13
ADLS_ACUITY_SCORE: 11
ADLS_ACUITY_SCORE: 12
ADLS_ACUITY_SCORE: 7
ADLS_ACUITY_SCORE: 11
ADLS_ACUITY_SCORE: 11
ADLS_ACUITY_SCORE: 12
ADLS_ACUITY_SCORE: 13
ADLS_ACUITY_SCORE: 11
ADLS_ACUITY_SCORE: 12
ADLS_ACUITY_SCORE: 11
ADLS_ACUITY_SCORE: 12
ADLS_ACUITY_SCORE: 12
ADLS_ACUITY_SCORE: 11
ADLS_ACUITY_SCORE: 6
ADLS_ACUITY_SCORE: 12
ADLS_ACUITY_SCORE: 11
ADLS_ACUITY_SCORE: 13
ADLS_ACUITY_SCORE: 12
ADLS_ACUITY_SCORE: 11
ADLS_ACUITY_SCORE: 11
ADLS_ACUITY_SCORE: 7
ADLS_ACUITY_SCORE: 11
ADLS_ACUITY_SCORE: 12
ADLS_ACUITY_SCORE: 12
ADLS_ACUITY_SCORE: 11
ADLS_ACUITY_SCORE: 12
ADLS_ACUITY_SCORE: 11
ADLS_ACUITY_SCORE: 12
ADLS_ACUITY_SCORE: 13
ADLS_ACUITY_SCORE: 11
ADLS_ACUITY_SCORE: 13
ADLS_ACUITY_SCORE: 11
ADLS_ACUITY_SCORE: 11
ADLS_ACUITY_SCORE: 6
ADLS_ACUITY_SCORE: 6
ADLS_ACUITY_SCORE: 7
ADLS_ACUITY_SCORE: 9
ADLS_ACUITY_SCORE: 12
ADLS_ACUITY_SCORE: 12
ADLS_ACUITY_SCORE: 11
ADLS_ACUITY_SCORE: 13
ADLS_ACUITY_SCORE: 11
ADLS_ACUITY_SCORE: 12
ADLS_ACUITY_SCORE: 11
ADLS_ACUITY_SCORE: 12
ADLS_ACUITY_SCORE: 11
ADLS_ACUITY_SCORE: 9
ADLS_ACUITY_SCORE: 11
ADLS_ACUITY_SCORE: 13
ADLS_ACUITY_SCORE: 11
ADLS_ACUITY_SCORE: 6
ADLS_ACUITY_SCORE: 6
ADLS_ACUITY_SCORE: 11
ADLS_ACUITY_SCORE: 13
ADLS_ACUITY_SCORE: 11
ADLS_ACUITY_SCORE: 13
ADLS_ACUITY_SCORE: 11
ADLS_ACUITY_SCORE: 11
ADLS_ACUITY_SCORE: 12
ADLS_ACUITY_SCORE: 11
ADLS_ACUITY_SCORE: 12
ADLS_ACUITY_SCORE: 6
DEPENDENT_IADLS:: INDEPENDENT
ADLS_ACUITY_SCORE: 11
ADLS_ACUITY_SCORE: 12
ADLS_ACUITY_SCORE: 12
ADLS_ACUITY_SCORE: 11
ADLS_ACUITY_SCORE: 12
ADLS_ACUITY_SCORE: 11
ADLS_ACUITY_SCORE: 12
ADLS_ACUITY_SCORE: 11
ADLS_ACUITY_SCORE: 9
ADLS_ACUITY_SCORE: 11
ADLS_ACUITY_SCORE: 11
ADLS_ACUITY_SCORE: 12
ADLS_ACUITY_SCORE: 11
ADLS_ACUITY_SCORE: 6
ADLS_ACUITY_SCORE: 12
ADLS_ACUITY_SCORE: 11
ADLS_ACUITY_SCORE: 11
ADLS_ACUITY_SCORE: 13
ADLS_ACUITY_SCORE: 11
ADLS_ACUITY_SCORE: 12
ADLS_ACUITY_SCORE: 11
ADLS_ACUITY_SCORE: 12
ADLS_ACUITY_SCORE: 11
ADLS_ACUITY_SCORE: 12
ADLS_ACUITY_SCORE: 11
ADLS_ACUITY_SCORE: 13
ADLS_ACUITY_SCORE: 12
ADLS_ACUITY_SCORE: 11
ADLS_ACUITY_SCORE: 11
ADLS_ACUITY_SCORE: 9
ADLS_ACUITY_SCORE: 11
ADLS_ACUITY_SCORE: 12
ADLS_ACUITY_SCORE: 13
ADLS_ACUITY_SCORE: 11
ADLS_ACUITY_SCORE: 13
ADLS_ACUITY_SCORE: 12
ADLS_ACUITY_SCORE: 12
ADLS_ACUITY_SCORE: 13
ADLS_ACUITY_SCORE: 11
ADLS_ACUITY_SCORE: 9
ADLS_ACUITY_SCORE: 11
ADLS_ACUITY_SCORE: 12
ADLS_ACUITY_SCORE: 12
ADLS_ACUITY_SCORE: 11

## 2021-01-01 ASSESSMENT — ENCOUNTER SYMPTOMS
ABDOMINAL DISTENTION: 1
EYE PAIN: 0
CHEST TIGHTNESS: 0
CONSTIPATION: 0
RESPIRATORY NEGATIVE: 1
ABDOMINAL PAIN: 1
FATIGUE: 1
PALPITATIONS: 0
NEUROLOGICAL NEGATIVE: 1
DIZZINESS: 0
MYALGIAS: 1
HEMATOCHEZIA: 1
APPETITE CHANGE: 1
GASTROINTESTINAL NEGATIVE: 1
COUGH: 0
FREQUENCY: 0
PARESTHESIAS: 1
VOMITING: 0
WEAKNESS: 0
MUSCULOSKELETAL NEGATIVE: 1
CARDIOVASCULAR NEGATIVE: 1
MYALGIAS: 1
DIARRHEA: 0
NAUSEA: 0
COLOR CHANGE: 1
SHORTNESS OF BREATH: 0
CONSTITUTIONAL NEGATIVE: 1
CHILLS: 0
SORE THROAT: 0
HEMATURIA: 0
HEADACHES: 0
SHORTNESS OF BREATH: 0
FEVER: 0
FREQUENCY: 0
DYSURIA: 0
ARTHRALGIAS: 1
DYSURIA: 0
COUGH: 0
HEMATURIA: 0
JOINT SWELLING: 1
SORE THROAT: 0
NAUSEA: 0
FEVER: 0
RESPIRATORY NEGATIVE: 1
DIARRHEA: 1
HEADACHES: 0
CHILLS: 0
PALPITATIONS: 0
HEARTBURN: 1
WHEEZING: 0
NERVOUS/ANXIOUS: 0
CARDIOVASCULAR NEGATIVE: 1
ALLERGIC/IMMUNOLOGIC NEGATIVE: 1
ABDOMINAL PAIN: 1
ABDOMINAL PAIN: 0

## 2021-01-01 ASSESSMENT — MIFFLIN-ST. JEOR
SCORE: 1637.72
SCORE: 1729.63
SCORE: 1710.3
SCORE: 1742.63
SCORE: 1739.63
SCORE: 1711.04
SCORE: 1706.63
SCORE: 1691.99
SCORE: 1749.63
SCORE: 1740.63
SCORE: 1715.63

## 2021-01-01 ASSESSMENT — PAIN SCALES - GENERAL
PAINLEVEL: NO PAIN (0)
PAINLEVEL: NO PAIN (0)

## 2021-07-01 NOTE — PROGRESS NOTES
Chief Complaint:    Chief Complaint   Patient presents with     Chest Pain     R sided rib pain after fell 1 week ago.         Medical Decision Making:    Vital signs reviewed by Will Sibley PA-C  BP (!) 170/74   Pulse 85   Temp 98.7  F (37.1  C)   Resp 16   SpO2 100%     Differential Diagnosis:  Rib contusion, Rib fracture     ASSESSMENT:     1. Contusion of rib on right side, initial encounter    2. Rib tenderness           PLAN:     Patient is in no acute distress.  Lung sounds clear and O2 sats at 100% on RA.  XR of the chest and ribs was negative for any acute fracture or pneumothorax per my read.  Continue tylenol PRN.  Ice to the affected area.  Hug pillow when coughing or sneezing.  Patient instructed to follow up with PCP in 1 week if symptoms are not improving.  Sooner if symptoms worsen.  Worrisome symptoms discussed with instructions to go to the ED.  Patient verbalized understanding and agreed with this plan.    Labs:     No results found for any visits on 07/01/21.    Current Meds:    Current Outpatient Medications:      vitamin B1 (THIAMINE) 250 MG tablet, Take 250 mg by mouth daily, Disp: , Rfl:     Allergies:  No Known Allergies    SUBJECTIVE    HPI: Dima Olivas is an 61 year old male who presents for evaluation and treatment of possible rib fracture.  Patient fell backwards on to his stairs 1 week ago on the R side.  He has had R sided rib pain since the injury.  Sneezing and coughing makes the pain worse.  He has not had any SOB, or hemoptysis.  He has been taking Tylenol and this helps.    ROS:      Review of Systems   Constitutional: Negative.  Negative for chills and fever.   HENT: Negative.  Negative for sore throat.    Respiratory: Negative.  Negative for cough, chest tightness, shortness of breath and wheezing.    Cardiovascular: Negative.  Negative for chest pain and palpitations.   Gastrointestinal: Negative.  Negative for abdominal pain, diarrhea, nausea and vomiting.    Genitourinary: Negative for dysuria, frequency, hematuria and urgency.   Musculoskeletal: Positive for myalgias.   Skin: Negative for rash.   Allergic/Immunologic: Negative.  Negative for immunocompromised state.   Neurological: Negative.  Negative for headaches.        Family History   Family History   Problem Relation Age of Onset     Unknown/Adopted Mother      Unknown/Adopted Father        Social History  Social History     Socioeconomic History     Marital status:      Spouse name: Not on file     Number of children: Not on file     Years of education: Not on file     Highest education level: Not on file   Occupational History     Not on file   Social Needs     Financial resource strain: Not on file     Food insecurity     Worry: Not on file     Inability: Not on file     Transportation needs     Medical: Not on file     Non-medical: Not on file   Tobacco Use     Smoking status: Current Every Day Smoker     Packs/day: 0.30     Types: Cigarettes     Smokeless tobacco: Never Used   Substance and Sexual Activity     Alcohol use: Yes     Alcohol/week: 7.0 standard drinks     Types: 7 Cans of beer per week     Frequency: 4 or more times a week     Drinks per session: 1 or 2     Comment: 1-2 drinks a day      Drug use: No     Sexual activity: Not on file   Lifestyle     Physical activity     Days per week: Not on file     Minutes per session: Not on file     Stress: Not on file   Relationships     Social connections     Talks on phone: Not on file     Gets together: Not on file     Attends [employer] service: Not on file     Active member of club or organization: Not on file     Attends meetings of clubs or organizations: Not on file     Relationship status: Not on file     Intimate partner violence     Fear of current or ex partner: Not on file     Emotionally abused: Not on file     Physically abused: Not on file     Forced sexual activity: Not on file   Other Topics Concern     Parent/sibling w/ CABG, MI  or angioplasty before 65F 55M? Not Asked   Social History Narrative     Not on file        Surgical History:  Past Surgical History:   Procedure Laterality Date     IR LIVER BIOPSY PERCUTANEOUS  3/11/2020     NO HISTORY OF SURGERY          Problem List:  Patient Active Problem List   Diagnosis     Bilateral inguinal hernia without obstruction or gangrene, recurrence not specified     Compensated HCV cirrhosis (H)     Liver lesion     Alcohol use     Tobacco abuse     Thrombocytopenia (H)           OBJECTIVE:     Vital signs noted and reviewed by Will Sibley PA-C  BP (!) 170/74   Pulse 85   Temp 98.7  F (37.1  C)   Resp 16   SpO2 100%      PEFR:    Physical Exam  Vitals signs and nursing note reviewed.   Constitutional:       General: He is not in acute distress.     Appearance: He is well-developed. He is not ill-appearing, toxic-appearing or diaphoretic.   HENT:      Head: Normocephalic and atraumatic.      Right Ear: Hearing, tympanic membrane, ear canal and external ear normal. Tympanic membrane is not perforated, erythematous, retracted or bulging.      Left Ear: Hearing, tympanic membrane, ear canal and external ear normal. Tympanic membrane is not perforated, erythematous, retracted or bulging.      Nose: Nose normal. No mucosal edema, congestion or rhinorrhea.      Mouth/Throat:      Pharynx: No oropharyngeal exudate or posterior oropharyngeal erythema.      Tonsils: No tonsillar exudate or tonsillar abscesses. 0 on the right. 0 on the left.   Eyes:      Pupils: Pupils are equal, round, and reactive to light.   Neck:      Musculoskeletal: Normal range of motion and neck supple.   Cardiovascular:      Rate and Rhythm: Normal rate and regular rhythm.      Heart sounds: Normal heart sounds, S1 normal and S2 normal. Heart sounds not distant. No murmur. No friction rub. No gallop.    Pulmonary:      Effort: Pulmonary effort is normal. No respiratory distress.      Breath sounds: Normal breath sounds. No  decreased breath sounds, wheezing, rhonchi or rales.   Abdominal:      General: Bowel sounds are normal. There is no distension.      Palpations: Abdomen is soft.      Tenderness: There is no abdominal tenderness.   Lymphadenopathy:      Cervical: No cervical adenopathy.   Skin:     General: Skin is warm and dry.      Findings: No rash.             Comments: Bruising of the lower R torso.  No pain with palpation.  Pain with palpation of the R side ribs in the midaxillary area.  No swelling or deformity.     Neurological:      Mental Status: He is alert.      Cranial Nerves: No cranial nerve deficit.   Psychiatric:         Attention and Perception: He is attentive.         Speech: Speech normal.         Behavior: Behavior normal. Behavior is cooperative.         Thought Content: Thought content normal.         Judgment: Judgment normal.             Will Sibley PA-C  7/1/2021, 11:14 AM

## 2021-07-01 NOTE — PATIENT INSTRUCTIONS
Patient Education     Rib Contusion or Minor Fracture    A rib contusion is a bruise to one or more rib bones. It may cause pain, tenderness, swelling, and a purplish color to the skin. There may be a sharp pain with each breath. A rib contusion takes anywhere from a few days to a few weeks to heal. A minor rib fracture or break may cause the same symptoms as a rib contusion. The small crack may not be seen on a regular chest X-ray. Treatment for both problems is basically the same.  Home care    You may use over-the-counter pain medicine to control pain, unless another pain medicine was prescribed. If you have chronic liver or kidney disease or ever had a stomach ulcer or GI (gastrointestinal) bleeding, talk with your healthcare provider before using these medicines.    Rest. Don't lift anything heavy or do any activity that causes pain.    Apply an ice pack over the injured area for 15 to 20 minutes every 1 to 2 hours. You should do this for the first 24 to 48 hours. To make an ice pack, put ice cubes in a plastic bag that seals at the top. Wrap the bag in a clean, thin towel or cloth. Never put ice or an ice pack directly on the skin. Continue with ice packs as needed for the relief of pain and swelling.    The first 3 to 4 weeks of healing will be the most painful. If your pain is not under control with the treatment given, call your healthcare provider. Sometimes a stronger pain medicine may be needed. A nerve block can be done in case of severe pain. It will numb the nerve between the ribs.  Follow-up care  Follow up with your healthcare provider, or as advised.  If X-rays were taken, you will be told of any new findings that may affect your care.  Call 911  Call 911 if you have:    Dizziness, weakness or fainting    Shortness of breath with or without chest discomfort    New or worsening pain  When to seek medical advice  Call your healthcare provider right away if any of these occur:    Fever of 100.4 F  (38 C) or higher, or as directed by your healthcare provider    Chills    Stomach pain, vomiting  Monica last reviewed this educational content on 6/1/2018 2000-2021 [employer]. All rights reserved. This information is not intended as a substitute for professional medical care. Always follow your healthcare professional's instructions.

## 2021-07-06 NOTE — PROGRESS NOTES
SUBJECTIVE:   CC: Dima Olivas is an 61 year old male who presents for preventative health visit.     {Split Bill scripting  The purpose of this visit is to discuss your medical history and prevent health problems before you are sick. You may be responsible for a co-pay, coinsurance, or deductible if your visit today includes services such as checking on a sore throat, having an x-ray or lab test, or treating and evaluating a new or existing condition :679477}  Patient has been advised of split billing requirements and indicates understanding: {Yes and No:900761}  HPI  {Add if <65 person on Medicare  - Required Questions (Optional):936671}  {Outside tests to abstract? :521514}    {additional problems to add (Optional):597037}    Today's PHQ-2 Score:   PHQ-2 ( 1999 [employer]) 3/3/2020   Q1: Little interest or pleasure in doing things 1   Q2: Feeling down, depressed or hopeless 1   PHQ-2 Score 2   Q1: Little interest or pleasure in doing things Several days   Q2: Feeling down, depressed or hopeless Several days   PHQ-2 Score 2       Abuse: Current or Past(Physical, Sexual or Emotional)- { :033677}  Do you feel safe in your environment? { :837197}    Have you ever done Advance Care Planning? (For example, a Health Directive, POLST, or a discussion with a medical provider or your loved ones about your wishes): { :721775}    Social History     Tobacco Use     Smoking status: Current Every Day Smoker     Packs/day: 0.30     Types: Cigarettes     Smokeless tobacco: Never Used   Substance Use Topics     Alcohol use: Yes     Alcohol/week: 7.0 standard drinks     Types: 7 Cans of beer per week     Frequency: 4 or more times a week     Drinks per session: 1 or 2     Comment: 1-2 drinks a day      {Rooming Staff- Complete this question if Prescreen response is not shown below for today's visit. If you drink alcohol do you typically have >3 drinks per day or >7 drinks per week? (Optional):395664}    No flowsheet data found.{add  "AUDIT responses (Optional) (A score of 7 for adult men is an indication of hazardous drinking; a score of 8 or more is an indication of an alcohol use disorder.  A score of 7 or more for adult women is an indication of hazardous drinking or an alchohol use disorder):673226}    Last PSA: No results found for: PSA    Reviewed orders with patient. Reviewed health maintenance and updated orders accordingly - { :038514::\"Yes\"}  {Chronicprobdata (optional):993614}    Reviewed and updated as needed this visit by clinical staff                 Reviewed and updated as needed this visit by Provider                {HISTORY OPTIONS (Optional):995114}    Review of Systems  {MALE ROS (Optional):675299::\"CONSTITUTIONAL: NEGATIVE for fever, chills, change in weight\",\"INTEGUMENTARY/SKIN: NEGATIVE for worrisome rashes, moles or lesions\",\"EYES: NEGATIVE for vision changes or irritation\",\"ENT: NEGATIVE for ear, mouth and throat problems\",\"RESP: NEGATIVE for significant cough or SOB\",\"CV: NEGATIVE for chest pain, palpitations or peripheral edema\",\"GI: NEGATIVE for nausea, abdominal pain, heartburn, or change in bowel habits\",\" male: negative for dysuria, hematuria, decreased urinary stream, erectile dysfunction, urethral discharge\",\"MUSCULOSKELETAL: NEGATIVE for significant arthralgias or myalgia\",\"NEURO: NEGATIVE for weakness, dizziness or paresthesias\",\"PSYCHIATRIC: NEGATIVE for changes in mood or affect\"}    OBJECTIVE:   There were no vitals taken for this visit.    Physical Exam  {Exam Choices (Optional):582000}    {Diagnostic Test Results (Optional):225159::\"Diagnostic Test Results:\",\"Labs reviewed in Epic\"}    ASSESSMENT/PLAN:   {Diag Picklist:103639}    Patient has been advised of split billing requirements and indicates understanding: {YES / NO:117581::\"Yes\"}  COUNSELING:   {MALE COUNSELING MESSAGES:055654::\"Reviewed preventive health counseling, as reflected in patient instructions\"}    Estimated body mass index is 28.32 " "kg/m  as calculated from the following:    Height as of 2/10/20: 1.778 m (5' 10\").    Weight as of 3/4/20: 89.5 kg (197 lb 6.4 oz).     {Weight Management Plan (ACO) Complete if BMI is abnormal-  Ages 18-64  BMI >24.9.  Age 65+ with BMI <23 or >30 (Optional):118608}    He reports that he has been smoking cigarettes. He has been smoking about 0.30 packs per day. He has never used smokeless tobacco.  Tobacco Cessation Action Plan:   {TOBACCO CESSATION ACTION PLAN:089907}      Counseling Resources:  ATP IV Guidelines  Pooled Cohorts Equation Calculator  FRAX Risk Assessment  ICSI Preventive Guidelines  Dietary Guidelines for Americans, 2010  USDA's MyPlate  ASA Prophylaxis  Lung CA Screening    Yasmin Gayle MD  [facility]  "

## 2021-07-07 PROBLEM — B19.20 HEPATITIS C VIRUS INFECTION WITHOUT HEPATIC COMA, UNSPECIFIED CHRONICITY: Status: ACTIVE | Noted: 2021-01-01

## 2021-07-07 PROBLEM — R60.0 PERIPHERAL EDEMA: Status: ACTIVE | Noted: 2021-01-01

## 2021-07-07 PROBLEM — R03.0 ELEVATED BLOOD PRESSURE READING WITHOUT DIAGNOSIS OF HYPERTENSION: Status: ACTIVE | Noted: 2021-01-01

## 2021-07-07 NOTE — PROGRESS NOTES
Assessment & Plan     Compensated HCV cirrhosis (H)  Cirrhosis possibly related to both hepatitis C and alcohol.  Previously seen outside and those records are reviewed with patient and through care everywhere.  They are looking to establish with GI within the [place] system.  Needs an updated MRI.  Last one done in January of this year with plans for 6-month follow-up.  There was a lesion concerning for paddle cellular carcinoma but biopsy was negative.  - GASTROENTEROLOGY ADULT REF CONSULT ONLY; Future  - INR  - CBC with platelets  - Comprehensive metabolic panel  - MR Liver wo & w Contrast; Future    Hepatitis C virus infection without hepatic coma, unspecified chronicity  As above  - GASTROENTEROLOGY ADULT REF CONSULT ONLY; Future  - MR Liver wo & w Contrast; Future    Liver lesion  As above  - MR Liver wo & w Contrast; Future    Peripheral edema  Worsening issue that he understands is related to the cirrhosis itself.  Discussed this is a tricky measure to deal with and typically takes a combination of furosemide and spironolactone with close follow-up of blood pressure and electrolytes.  - furosemide (LASIX) 20 MG tablet; Take 1 tablet (20 mg) by mouth daily  - spironolactone (ALDACTONE) 50 MG tablet; Take 1 tablet (50 mg) by mouth daily    Elevated blood pressure  Possible hypertension we will continue to follow with his diuretic therapy.       See Patient Instructions    Return in about 3 months (around 10/7/2021) for Follow up of Chronic Issues, in office.    Yasmin Gayle MD  [facility] ELZBIETA Kimball is a 61 year old who presents for the following health issues     Here today to establish care regarding cirrhosis and hepatitis C.  Previous records reviewed through care everywhere and with patient.  Looking to establish new GI and is due for follow-up MRI.  Having some significant swelling in his legs.    Review of systems otherwise negative      Objective    BP  (!) 170/72   Pulse 110   Resp 16   Wt 93.8 kg (206 lb 12.8 oz)   SpO2 99%   BMI 29.67 kg/m    Body mass index is 29.67 kg/m .  Physical Exam   Alert, pleasant, upbeat, and in no apparent discomfort.  S1 and S2 normal, no murmurs, clicks, gallops or rubs. Regular rate and rhythm. Chest is clear; no wheezes or rales.   2+ edema up to the knees bilaterally  Past labs reviewed with the patient.     Reviewed outside imaging            Answers for HPI/ROS submitted by the patient on 7/7/2021   Annual Exam:  Frequency of exercise:: 1 day/week  Getting at least 3 servings of Calcium per day:: Yes  Diet:: Regular (no restrictions)  Taking medications regularly:: Not Applicable  Medication side effects:: Not applicable  Bi-annual eye exam:: Yes  Dental care twice a year:: NO  Sleep apnea or symptoms of sleep apnea:: Daytime drowsiness  abdominal pain: Yes  Blood in stool: Yes  Blood in urine: No  chest pain: No  chills: No  congestion: Yes  constipation: No  cough: No  diarrhea: Yes  dizziness: No  ear pain: No  eye pain: No  nervous/anxious: No  fever: No  frequency: No  genital sores: No  headaches: No  hearing loss: No  heartburn: Yes  arthralgias: Yes  joint swelling: Yes  peripheral edema: Yes  mood changes: No  myalgias: Yes  nausea: No  dysuria: No  palpitations: No  Skin sensation changes: Yes  sore throat: No  urgency: No  rash: No  shortness of breath: No  visual disturbance: No  weakness: No  impotence: No  penile discharge: No  Additional concerns today:: No  Duration of exercise:: 15-30 minutes

## 2021-07-09 NOTE — RESULT ENCOUNTER NOTE
Jigar,  Well, there are some changes in blood counts and liver related test that directly relate to the cirrhosis.  So we should get that scan done and make sure to get you in with a specialist that can help direct what to do with all of this.  SALOMON Gayle M.D.

## 2021-08-02 NOTE — RESULT ENCOUNTER NOTE
Jigar Nunez,  The MRI looks, to me, roughly the same as the previous results in the sense that there are some scattered lesions here and there.  I know the previous biopsies were negative.  So I think the GI specialist will have more of a sense of what to do with these as to whether we continue to follow periodically or what.  I know that appointment is coming up soon so we will keep an eye on things.  SALOMON Gayle M.D.

## 2021-08-06 NOTE — LETTER
8/6/2021         RE: Dima Olivas  [address]  [facility] [address]        Dear Colleague,    Thank you for referring your patient, Dima Olivas, to the [facility] [place]. Please see a copy of my visit note below.    Jigar is a 61 year old who is being evaluated via a billable video visit.      How would you like to obtain your AVS? MyChart  If the video visit is dropped, the invitation should be resent by: Send to e-mail at: [email or web address]  Will anyone else be joining your video visit? No      Video Start Time: 09:48 am   Video-Visit Details    Type of service:  Video Visit    Video End Time: 10:13 am     Originating Location (pt. Location): Home     Distant Location (provider location):  [facility]     Platform used for Video Visit: [facility]     Hepatology Follow-up Clinic note  Dima Olivas   Date of Birth 1960  Date of Service 8/6/2021    Reason for follow-up: Hep C/ETOH cirrhossi          Assessment/plan:   Dima Olivas is a 61 year old male with Hep C (treatment naive)ETOH cirrhosis and history of liver lesion, previously biopsied and lost to follow up for past year. 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 He does not have any overt evidence of ascites or hepatic encephalopathy. Lower extremity improved with started diuretics. MELD-Na 16, driven by Tbili 3.3 (last Dbili was 1.5) and INR of 1.55. No previous EGD for variceal screening. If patient is candidate for systemic treatment, would recommend EGD.     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     - Oncology referral for consideration of systemic treatment   - Liver function may preclude him from systemic treatment, which was discussed with patient  - He does have untreated Hepatitis C, so attempt could be made to treat  Hepatitis C in attempt improve liver function    # Variceal screening/Colon cancer screening:   - EGD/Colonoscopy based on above plan     # CARMEN, amilcar,   - Continue 20 mg furosemide  - Continue 50 mg spironolactone    # Cramping:   - Trial of 220 mg Zinc twice daily     # Follow-up in clinic in six months with Dr. Parish or sooner as needed    Romy Lloyd PA-C   [facility]    -----------------------------------------------------       HPI:   Dima Olivas is a 61 year old male presenting for follow-up.     HPI:  Cirrhosis  - dx Jan 2020  - HCV/ETOH  - no hx HE, ascites or variceal bleed  - no prior EGD  - HCC screening- see below     HCV  - dx 2009  - hx IVDU, YOSHI 1980s  - GT-3  - no prior rx    Patient was last seen Dr. Parish by 3/4/2020. He was found to have a liver lesion and had US guided liver biopsy  Did not follow up.      Patient states he's felt a big lump on his abdomen. Lost 26 lbs in the last two weeks, much of which was fluid weight. Lost some muscle mass.Taking 50 mg and 20 mg lasix.  No problems with confusion.     Feeling very fatigued.  Appetite is has been good.   Sleeps 12 hours at night and also want to sleep throughout day.   Having blood and black in stools for the past year.     Patient denies jaundice, abdominal distension or confusion.    Patient also denies melena, hematochezia or hematemesis.  Patient denies  fevers, sweats.     Lives with wife and youngest son that 22 years. Have 5 sons.   No alcohol in the past six months.       Medical hx Surgical hx   Past Medical History:   Diagnosis Date     Cirrhosis (H) 01/2020     Hepatitis C 2009     Hx of intravenous drug use in remission      Smoker     Past Surgical History:   Procedure Laterality Date     IR LIVER BIOPSY PERCUTANEOUS  3/11/2020     NO HISTORY OF SURGERY                   Medications:     Current Outpatient Medications   Medication     furosemide (LASIX) 20 MG tablet     spironolactone  (ALDACTONE) 50 MG tablet     acetaminophen (TYLENOL) 500 MG tablet     No current facility-administered medications for this visit.            Allergies:   No Known Allergies         Review of Systems:   10 points ROS was obtained and highlighted in the HPI, otherwise negative.          Physical Exam:   GENERAL: healthy, alert and no distress  EYES: Eyes grossly normal to inspection, conjunctivae and sclerae normal  RESP: no audible wheeze, cough, or visible cyanosis.  No visible retractions or increased work of breathing.  Able to speak fully in complete sentences  NEURO: Cranial nerves grossly intact, mentation intact and speech normal  PSYCH: mentation appears normal, affect normal/bright, judgement and insight intact, normal speech and appearance well-groomed           Data:   Reviewed in person and significant for:    Lab Results   Component Value Date     07/07/2021      Lab Results   Component Value Date    POTASSIUM 3.7 07/07/2021     Lab Results   Component Value Date    CHLORIDE 110 07/07/2021     Lab Results   Component Value Date    CO2 28 07/07/2021     Lab Results   Component Value Date    BUN 8 07/07/2021     Lab Results   Component Value Date    CR 0.89 07/07/2021       Lab Results   Component Value Date    WBC 4.4 07/07/2021     Lab Results   Component Value Date    HGB 10.6 07/07/2021     Lab Results   Component Value Date    HCT 31.7 07/07/2021     Lab Results   Component Value Date     07/07/2021     Lab Results   Component Value Date    PLT 57 07/07/2021       Lab Results   Component Value Date     07/07/2021     Lab Results   Component Value Date    ALT 59 07/07/2021     No results found for: BILICONJ   Lab Results   Component Value Date    BILITOTAL 3.3 07/07/2021       Lab Results   Component Value Date    ALBUMIN 1.8 07/07/2021     Lab Results   Component Value Date    PROTTOTAL 6.5 07/07/2021      Lab Results   Component Value Date    ALKPHOS 149 07/07/2021       Lab  Results   Component Value Date    INR 1.55 07/07/2021     MR LIVER WO W CONTRAST:   7/29/2021:   1. Cirrhosis and evidence of portal hypertension. Mild hepatic  steatosis.  2. In hepatic segment 8, there is a 3.2 cm observation with T1  hyperintense rim demonstrating progressive enhancement. Minimally  increased in size since prior without meeting criteria for partial  growth; LIRADS M. This has been previously biopsied.  3. In hepatic segment 7, there is a 17 mm likely arterial enhancing  lesion with suspected washout, pseudocapsule formation, and threshold  growth; LIRADS 5.  4. In hepatic segment 8, there is a 14 mm arterial enhancing lesion  with enhancement characteristics similar to the blood pool, to  represent hepatic hemangioma; LIRADS 2.  5. Interval increase in size of the left lateral segment. Diffuse  increased T2 signal with heterogeneous enhancement throughout the left  hepatic lobe is more apparent than on prior study. There is also now  interval dilatation and thrombosis of the left intrahepatic portal  veins extending to the bifurcation and superior mesenteric aspect of  the main portal vein.     Findings are concerning for diffuse infiltrative HCC with tumor thrombus in the portal venous system. Tissue sampling could be considered to confirm.    MR Liver W/WO IV CONT  1/28/2020:   1. Cirrhosis with sequelae of portal hypertension including splenomegaly and a recannulized paraumbilical vein. No ascites.   2. In Couinaud segment 8, there is a 3.1 x 2.5 cm lesion with features concerning for malignancy, not meeting strict diagnostic criteria for hepatocellular carcinoma (LI-RADS Category M).   3. In Couinaud segment 7 at the hepatic dome, there is a 1.2 x 1.0 cm T1 hyperintense arterially enhancing observation without washout/pseudocapsule formation (LI-RADS Category 3 - intermediate probability of malignancy).   4. In Couinaud segment 8 adjacent to the middle hepatic vein, there is a 1.9 x 1.4 cm  bilobed T2 hyperintense, homogeneously arterially enhancing structure which matches the blood pool, favored to represent a hemangioma (LI-RADS Category 2 - probably benign). Recommend attention on follow-up.   5. Scattered areas of subcapsular retraction with mild T2 hyperintensity and progressive enhancement, most suggestive of confluent fibrosis. Recommend continued attention on follow-up.   6. Mildly prominent 1.1 cm masood hepatis lymph node which could be reactive. Recommend attention on follow-up.        Again, thank you for allowing me to participate in the care of your patient.        Sincerely,        Romy Lloyd PA-C

## 2021-08-06 NOTE — PROGRESS NOTES
Jigar is a 61 year old who is being evaluated via a billable video visit.      How would you like to obtain your AVS? MyChart  If the video visit is dropped, the invitation should be resent by: Send to e-mail at: [email or web address]  Will anyone else be joining your video visit? No      Video Start Time: 09:48 am   Video-Visit Details    Type of service:  Video Visit    Video End Time: 10:13 am     Originating Location (pt. Location): Home     Distant Location (provider location):  [facility] [place]     Platform used for Video Visit: [facility]     Hepatology Follow-up Clinic note  Dima Olivas   Date of Birth 1960  Date of Service 8/6/2021    Reason for follow-up: Hep C/ETOH cirrhossi          Assessment/plan:   Dima Olivas is a 61 year old male with Hep C (treatment naive)ETOH cirrhosis and history of liver lesion, previously biopsied and lost to follow up for past year. 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 He does not have any overt evidence of ascites or hepatic encephalopathy. Lower extremity improved with started diuretics. MELD-Na 16, driven by Tbili 3.3 (last Dbili was 1.5) and INR of 1.55. No previous EGD for variceal screening. If patient is candidate for systemic treatment, would recommend EGD.     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     - Oncology referral for consideration of systemic treatment   - Liver function may preclude him from systemic treatment, which was discussed with patient  - He does have untreated Hepatitis C, so attempt could be made to treat Hepatitis C in attempt improve liver function    # Variceal screening/Colon cancer screening:   - EGD/Colonoscopy based on above plan     # CARMEN, improved,   - Continue 20 mg furosemide  - Continue 50 mg spironolactone    # Cramping:   - Trial of 220 mg Zinc twice daily      # Follow-up in clinic in six months with Dr. Parish or sooner as needed    Romy Lloyd PA-C   [facility]    -----------------------------------------------------       HPI:   Dima Olivas is a 61 year old male presenting for follow-up.     HPI:  Cirrhosis  - dx Jan 2020  - HCV/ETOH  - no hx HE, ascites or variceal bleed  - no prior EGD  - HCC screening- see below     HCV  - dx 2009  - hx IVDU, YOSHI 1980s  - GT-3  - no prior rx    Patient was last seen Dr. Parish by 3/4/2020. He was found to have a liver lesion and had US guided liver biopsy  Did not follow up.      Patient states he's felt a big lump on his abdomen. Lost 26 lbs in the last two weeks, much of which was fluid weight. Lost some muscle mass.Taking 50 mg and 20 mg lasix.  No problems with confusion.     Feeling very fatigued.  Appetite is has been good.   Sleeps 12 hours at night and also want to sleep throughout day.   Having blood and black in stools for the past year.     Patient denies jaundice, abdominal distension or confusion.    Patient also denies melena, hematochezia or hematemesis.  Patient denies  fevers, sweats.     Lives with wife and youngest son that 22 years. Have 5 sons.   No alcohol in the past six months.       Medical hx Surgical hx   Past Medical History:   Diagnosis Date     Cirrhosis (H) 01/2020     Hepatitis C 2009     Hx of intravenous drug use in remission      Smoker     Past Surgical History:   Procedure Laterality Date     IR LIVER BIOPSY PERCUTANEOUS  3/11/2020     NO HISTORY OF SURGERY                   Medications:     Current Outpatient Medications   Medication     furosemide (LASIX) 20 MG tablet     spironolactone (ALDACTONE) 50 MG tablet     acetaminophen (TYLENOL) 500 MG tablet     No current facility-administered medications for this visit.            Allergies:   No Known Allergies         Review of Systems:   10 points ROS was obtained and highlighted in the HPI, otherwise negative.           Physical Exam:   GENERAL: healthy, alert and no distress  EYES: Eyes grossly normal to inspection, conjunctivae and sclerae normal  RESP: no audible wheeze, cough, or visible cyanosis.  No visible retractions or increased work of breathing.  Able to speak fully in complete sentences  NEURO: Cranial nerves grossly intact, mentation intact and speech normal  PSYCH: mentation appears normal, affect normal/bright, judgement and insight intact, normal speech and appearance well-groomed           Data:   Reviewed in person and significant for:    Lab Results   Component Value Date     07/07/2021      Lab Results   Component Value Date    POTASSIUM 3.7 07/07/2021     Lab Results   Component Value Date    CHLORIDE 110 07/07/2021     Lab Results   Component Value Date    CO2 28 07/07/2021     Lab Results   Component Value Date    BUN 8 07/07/2021     Lab Results   Component Value Date    CR 0.89 07/07/2021       Lab Results   Component Value Date    WBC 4.4 07/07/2021     Lab Results   Component Value Date    HGB 10.6 07/07/2021     Lab Results   Component Value Date    HCT 31.7 07/07/2021     Lab Results   Component Value Date     07/07/2021     Lab Results   Component Value Date    PLT 57 07/07/2021       Lab Results   Component Value Date     07/07/2021     Lab Results   Component Value Date    ALT 59 07/07/2021     No results found for: BILICONJ   Lab Results   Component Value Date    BILITOTAL 3.3 07/07/2021       Lab Results   Component Value Date    ALBUMIN 1.8 07/07/2021     Lab Results   Component Value Date    PROTTOTAL 6.5 07/07/2021      Lab Results   Component Value Date    ALKPHOS 149 07/07/2021       Lab Results   Component Value Date    INR 1.55 07/07/2021     MR LIVER WO W CONTRAST:   7/29/2021:   1. Cirrhosis and evidence of portal hypertension. Mild hepatic  steatosis.  2. In hepatic segment 8, there is a 3.2 cm observation with T1  hyperintense rim demonstrating progressive  enhancement. Minimally  increased in size since prior without meeting criteria for partial  growth; LIRADS M. This has been previously biopsied.  3. In hepatic segment 7, there is a 17 mm likely arterial enhancing  lesion with suspected washout, pseudocapsule formation, and threshold  growth; LIRADS 5.  4. In hepatic segment 8, there is a 14 mm arterial enhancing lesion  with enhancement characteristics similar to the blood pool, to  represent hepatic hemangioma; LIRADS 2.  5. Interval increase in size of the left lateral segment. Diffuse  increased T2 signal with heterogeneous enhancement throughout the left  hepatic lobe is more apparent than on prior study. There is also now  interval dilatation and thrombosis of the left intrahepatic portal  veins extending to the bifurcation and superior mesenteric aspect of  the main portal vein.     Findings are concerning for diffuse infiltrative HCC with tumor thrombus in the portal venous system. Tissue sampling could be considered to confirm.    MR Liver W/WO IV CONT  1/28/2020:   1. Cirrhosis with sequelae of portal hypertension including splenomegaly and a recannulized paraumbilical vein. No ascites.   2. In Couinaud segment 8, there is a 3.1 x 2.5 cm lesion with features concerning for malignancy, not meeting strict diagnostic criteria for hepatocellular carcinoma (LI-RADS Category M).   3. In Couinaud segment 7 at the hepatic dome, there is a 1.2 x 1.0 cm T1 hyperintense arterially enhancing observation without washout/pseudocapsule formation (LI-RADS Category 3 - intermediate probability of malignancy).   4. In Couinaud segment 8 adjacent to the middle hepatic vein, there is a 1.9 x 1.4 cm bilobed T2 hyperintense, homogeneously arterially enhancing structure which matches the blood pool, favored to represent a hemangioma (LI-RADS Category 2 - probably benign). Recommend attention on follow-up.   5. Scattered areas of subcapsular retraction with mild T2 hyperintensity  and progressive enhancement, most suggestive of confluent fibrosis. Recommend continued attention on follow-up.   6. Mildly prominent 1.1 cm masood hepatis lymph node which could be reactive. Recommend attention on follow-up.

## 2021-08-30 PROBLEM — R76.8 HEPATITIS C ANTIBODY TEST POSITIVE: Status: ACTIVE | Noted: 2021-01-01

## 2021-08-30 NOTE — PROGRESS NOTES
Patient Location [place]  How would you like to obtain your AVS? MyChart  If the video visit is dropped, the invitation should be resent by: Text to cell phone: [phone]  Will anyone else be joining your video visit? Yes: Spouse . How would they like to receive their invitation? Other e-mail: NA   Pt notes last week fever/cough/symptoms; no results back     I am seeing Jigar Olivas today for evaluation for possible hepatocellular carcinoma.    He is a 61-year-old gentleman with known hepatitis C and associated cirrhosis who a year ago was noted on imaging to have a greater than 3 cm lesion in the right lobe of his liver with peripheral and progressive enhancement which she had a biopsy demonstrating normal liver.  His alpha-fetoprotein at that time was normal.  He was lost to subsequent follow-up but now has returned.  At present he notes some degree of fatigue but generally feels well.  He intermittently has red blood in his stools or black stools.  He has never vomited blood.  He has poor sleep schedules but has had no overt confusion.  He was having significant lower extremity edema which is nearly completely resolved with Lasix and Aldactone.  He has no pain he has not lost any weight and I could elicit no other significant symptoms.    Last week he had a febrile illness with cough, vomiting and diarrhea all of which is since resolved.  He has had a Covid test done but has not yet received the results.    His past medical history is notable for hepatitis C for which she has not received any specific therapy.    His family history is unknown as he is adopted.    He is  and accompanied on today's visit by his wife.  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  He has been actively looking for work unable to find another job.  He and his wife Petrona have 3 children together and the each have one other child by prior marriages.  He has been a longtime smoker but has been able to cut down  recently to where he is smoking about 1/2 pack/day.    GENERAL: Healthy, alert and no distress  EYES: Eyes grossly normal to inspection.  No discharge or erythema, or obvious scleral/conjunctival abnormalities.  RESP: No audible wheeze, cough, or visible cyanosis.  No visible retractions or increased work of breathing.    SKIN: Visible skin clear. No significant rash, abnormal pigmentation or lesions.  NEURO: Cranial nerves grossly intact.  Mentation and speech appropriate for age.  PSYCH: Mentation appears normal, affect normal/bright, judgement and insight intact, normal speech and appearance well-groomed.      I personally reviewed his imaging studies and went over the results with the patient and his wife.  In segment 8 is the same 3 cm tumor with out much change in size and characterized as LI-RADS M.  In segment 7 is a 1.7 cm lesion which has uncertain arterial enhancement but has washout and a pseudocapsule and is grown from 1.2 cm a year ago making it a LI-RADS 5.  New in the left lobe of his liver is expansile thrombus in his portal vein which does not clearly have arterial enhancement on my review and then diffuse areas of irregular enhancement throughout the enlarged left lobe.    His most recent lab work shows an albumin of 1.8, a bilirubin of 3.3, and INR of 1.5, and platelets of 57.    Assessment/plan: Poorly compensated cirrhosis due to hepatitis C and prior alcohol use in a patient with a good performance status and multiple liver lesions highly suspicious for hepatocellular carcinoma.  The smaller lesion on the basis of growth meets Li-RADS criteria for for HCC.  The larger lesion has an appearance to me more typical of a cholangiocarcinoma.  The diffuse infiltrative changes with the expansile thrombus in the portal vein is highly suspicious for an infiltrative HCC with portal vein tumor.  I told the patient I think we should repeat his lab work both to help assess the status of his liver function  again and to check an alpha-fetoprotein to help us determine our course of action.  Currently his child's Jang score is 9 making him a marginal candidate for systemic therapy.  I discussed with him that our best frontline therapy in the setting would be with atezolizumab plus bevacizumab but before considering that we would need to clarify if he has varices that would present an untenable bleeding risk and also that we see his liver function perhaps be a little better than it was on last check.  Alternatively we could consider the use of a tyrosine kinase inhibitor.  I explained to him in the setting that his survival is dependent is much on his liver function and has his presence of his tumor and if his liver function deteriorates at all that may become a bigger problem than the cancer itself.  If his alpha-fetoprotein is extremely high, given the presence of 1 small HCC by imaging criteria his liver and cavity except that the other changes represent the same disease process.  On the other hand the alpha-fetoprotein is not extremely high, we had talked about him previously at tumor conference and I think I would like to review that discussion again to see what our team thinks of pursuing a biopsy of the left lobe to clarify if he does have diffuse infiltrative HCC.  I do not think I want to rebiopsy the relatively stable lesion in segment 8 that looks more like a cholangiocarcinoma but that would be a consideration as well.  I told the patient we would reconvene for further discussion once I see the results of repeat lab work, had the above discussion with radiology regarding biopsy and an upper endoscopy to assess for varices.    Total time by me today inclusive of the above discussion, review of his internal and external imaging, review of his extensive prior records, coordination of care, ordering and documentation was 95 minutes.

## 2021-08-30 NOTE — LETTER
8/30/2021         RE: Dima Olivas  [address]  [facility] [address]        Dear Colleague,    Thank you for referring your patient, Dima Olivas, to the [facility]. Please see a copy of my visit note below.    Patient Location [place]  How would you like to obtain your AVS? MyChart  If the video visit is dropped, the invitation should be resent by: Text to cell phone: [phone]  Will anyone else be joining your video visit? Yes: Spouse . How would they like to receive their invitation? Other e-mail: NA   Pt notes last week fever/cough/symptoms; no results back     I am seeing Jigar Olivas today for evaluation for possible hepatocellular carcinoma.    He is a 61-year-old gentleman with known hepatitis C and associated cirrhosis who a year ago was noted on imaging to have a greater than 3 cm lesion in the right lobe of his liver with peripheral and progressive enhancement which she had a biopsy demonstrating normal liver.  His alpha-fetoprotein at that time was normal.  He was lost to subsequent follow-up but now has returned.  At present he notes some degree of fatigue but generally feels well.  He intermittently has red blood in his stools or black stools.  He has never vomited blood.  He has poor sleep schedules but has had no overt confusion.  He was having significant lower extremity edema which is nearly completely resolved with Lasix and Aldactone.  He has no pain he has not lost any weight and I could elicit no other significant symptoms.    Last week he had a febrile illness with cough, vomiting and diarrhea all of which is since resolved.  He has had a Covid test done but has not yet received the results.    His past medical history is notable for hepatitis C for which she has not received any specific therapy.    His family history is unknown as he is adopted.    He is  and accompanied on today's visit by his wife.  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  He has been actively looking for work unable to find another job.  He and his wife Petrona have 3 children together and the each have one other child by prior marriages.  He has been a longtime smoker but has been able to cut down recently to where he is smoking about 1/2 pack/day.    GENERAL: Healthy, alert and no distress  EYES: Eyes grossly normal to inspection.  No discharge or erythema, or obvious scleral/conjunctival abnormalities.  RESP: No audible wheeze, cough, or visible cyanosis.  No visible retractions or increased work of breathing.    SKIN: Visible skin clear. No significant rash, abnormal pigmentation or lesions.  NEURO: Cranial nerves grossly intact.  Mentation and speech appropriate for age.  PSYCH: Mentation appears normal, affect normal/bright, judgement and insight intact, normal speech and appearance well-groomed.      I personally reviewed his imaging studies and went over the results with the patient and his wife.  In segment 8 is the same 3 cm tumor with out much change in size and characterized as LI-RADS M.  In segment 7 is a 1.7 cm lesion which has uncertain arterial enhancement but has washout and a pseudocapsule and is grown from 1.2 cm a year ago making it a LI-RADS 5.  New in the left lobe of his liver is expansile thrombus in his portal vein which does not clearly have arterial enhancement on my review and then diffuse areas of irregular enhancement throughout the enlarged left lobe.    His most recent lab work shows an albumin of 1.8, a bilirubin of 3.3, and INR of 1.5, and platelets of 57.    Assessment/plan: Poorly compensated cirrhosis due to hepatitis C and prior alcohol use in a patient with a good performance status and multiple liver lesions highly suspicious for hepatocellular carcinoma.  The smaller lesion on the basis of growth meets Li-RADS criteria for for HCC.  The larger lesion has an appearance to me more typical of a cholangiocarcinoma.   The diffuse infiltrative changes with the expansile thrombus in the portal vein is highly suspicious for an infiltrative HCC with portal vein tumor.  I told the patient I think we should repeat his lab work both to help assess the status of his liver function again and to check an alpha-fetoprotein to help us determine our course of action.  Currently his child's Jang score is 9 making him a marginal candidate for systemic therapy.  I discussed with him that our best frontline therapy in the setting would be with atezolizumab plus bevacizumab but before considering that we would need to clarify if he has varices that would present an untenable bleeding risk and also that we see his liver function perhaps be a little better than it was on last check.  Alternatively we could consider the use of a tyrosine kinase inhibitor.  I explained to him in the setting that his survival is dependent is much on his liver function and has his presence of his tumor and if his liver function deteriorates at all that may become a bigger problem than the cancer itself.  If his alpha-fetoprotein is extremely high, given the presence of 1 small HCC by imaging criteria his liver and cavity except that the other changes represent the same disease process.  On the other hand the alpha-fetoprotein is not extremely high, we had talked about him previously at tumor conference and I think I would like to review that discussion again to see what our team thinks of pursuing a biopsy of the left lobe to clarify if he does have diffuse infiltrative HCC.  I do not think I want to rebiopsy the relatively stable lesion in segment 8 that looks more like a cholangiocarcinoma but that would be a consideration as well.  I told the patient we would reconvene for further discussion once I see the results of repeat lab work, had the above discussion with radiology regarding biopsy and an upper endoscopy to assess for varices.    Total time by me today  inclusive of the above discussion, review of his internal and external imaging, review of his extensive prior records, coordination of care, ordering and documentation was 95 minutes.      Again, thank you for allowing me to participate in the care of your patient.        Sincerely,        Basim Germain MD

## 2021-09-01 NOTE — TELEPHONE ENCOUNTER
Screening Questions  1. Are you active on mychart? YES     2. What insurance is in the chart? [facility]     2.  Ordering/Referring Provider: Basim Germain MD in  ONCOLOGY ADULT    3. BMI 25.8 5'10/180#    4. Are you on daily home oxygen? No     5. Do you have a history of difficult airway? No     6. Have you had a heart, lung, or liver transplant? No     7. Are you currently on dialysis? no    8. Have you had a stroke or Transient ischemic atttack (TIA) within 6 months? No     9. In the past 6 months, have you had any heart related issues including cardiomyopathy or heart attack?         If yes, did it require cardiac stenting or other implantable device? No     10. Do you have any implantable devices in your body (pacemaker, defib, LVAD)?  No     11. Do you take nitroglycerin? If yes, how often? No     12. Are you currently taking any blood thinners? No     13. Are you a diabetic? No     14. (Females) Are you currently pregnant? NA  If yes, how many weeks?    15. Have you had a procedure in the past that was difficult to tolerate with conscious sedation? Any allergies to Fentanyl or Versed  No     16. Are you taking any scheduled prescription narcotics more than once daily? No     17. Do you have any chemical dependencies such as alcohol, street drugs, or methadone? No     18. Do you have any history of post-traumatic stress syndrome or mental health issues? No     19. Do you transfer independently? Yes     20.  Do you have any issues with constipation?     21. Preferred Pharmacy for Pre Prescription     Scheduling Details    Which Colonoscopy Prep was Sent?:   Procedure Scheduled: EGD   Provider/Surgeon: PAUL  Date of Procedure: 9/24/2021  [place] [facility] [place]   Caller (Please ask for phone number if not scheduled by patient): PATIENT       Sedation Type: CS  Conscious Sedation- Needs  for 6 hours after the procedure  MAC/General-Needs  for 24 hours after procedure    Pre-op  Required at [facility], [facility] and OR for MAC sedation:   (if yes advise patient they will need a pre-op prior to procedure)      Is patient on blood thinners? -NO  (If yes- inform patient to follow up with PCP or provider for follow up instructions)     Informed patient they will need an adult  YES   Cannot take any type of public or medical transportation alone    Informed Patient of COVID Test Requirement YES-SCHEDULED     Confirmed Nurse will call to complete assessment YES     Additional comments:

## 2021-09-16 NOTE — TELEPHONE ENCOUNTER
Attempted to contact patient regarding upcoming upper endoscopy procedure on 9-24 for pre assessment questions. No answer.     Left message to return call to [phone] #2    Covid test scheduled: 9-21 M [facility]    Arrival time: 0920    Facility location: [facility]    Sedation type: conscious    Bowel prep recommendation: N/a    Lila Ghotra RN

## 2021-09-20 NOTE — TELEPHONE ENCOUNTER
Second attempt.     Attempted to contact patient regarding upcoming EGD procedure on 9/24/21 for pre assessment questions. No answer.     Left message to return call to [phone] #2    Covid test scheduled: 9/21/21    Sedation type: CS. Per chart review patient has refrained from alcohol for past six months.     Will send [facility] [employer] message to return call.     Gayle Reid RN

## 2021-09-21 NOTE — TELEPHONE ENCOUNTER
Covid test scheduled: 9-21 M [facility]    Arrival time: 0920    Facility location: [facility]    Sedation type: conscious    Bowel prep recommendation: N/a    Lila Ghotra RN    Instructions, policy, conscious sedation plan reviewed. Instructed patient to have someone stay with them for 6 hours post exam.

## 2021-10-11 NOTE — Clinical Note
10/11/2021         RE: Dima Olivas  [address]  [facility] [address]        Dear Colleague,    Thank you for referring your patient, Dima Olivas, to the [facility]. Please see a copy of my visit note below.    Jigar is a 61 year old who is being evaluated via a billable video visit.      How would you like to obtain your AVS? MyChart  If the video visit is dropped, the invitation should be resent by: Text to cell phone: [phone]   Will anyone else be joining your video visit? Yes, wife at home with pt.  {If patient encounters technical issues they should call [phone] :[id number]}    Video Start Time: {video visit start/end time for provider to select:465849}  Video-Visit Details    Type of service:  Video Visit    Video End Time:{video visit start/end time for provider to select:[id number]}    Originating Location (pt. Location): Home    Distant Location (provider location):  [facility]     Platform used for Video Visit: [employer]       I am seeing Jigar Olivas today for follow up for possible hepatocellular carcinoma.    He is a 61-year-old gentleman with known hepatitis C and associated cirrhosis who a year ago was noted on imaging to have a greater than 3 cm lesion in the right lobe of his liver with peripheral and progressive enhancement which he had a biopsy demonstrating normal liver.  His alpha-fetoprotein at that time was normal.  He was lost to subsequent follow-up for some time, but returned for f/u this summer.  MRI liver on 7/29 showed multiple liver lesions highly suspicious for hepatocellular carcinoma.  The smaller lesion on the basis of growth meets Li-RADS criteria for for HCC.  The larger lesion has an appearance to me more typical of a cholangiocarcinoma.  The diffuse infiltrative changes with the expansile thrombus in the portal vein is highly suspicious for an infiltrative HCC with portal vein tumor.  Following our  "initial visit, we made the decision to reconvene following repeat lab work and EGD.  EGD was obtained which showed no esophageal varices, and lab work from 9/7 remained relatively stable to somewhat improved from prior with T bili of 3.1, albumin of 2.2, INR of 1.5.    Interval History:  At present, he reports \"extreme fatigue\" and wanting to sleep all the time, which he has felt for the past year.  He is otherwise feeling well without notable abdominal pain, fevers, chills, or black or bloody stools.  He notes ~monthly episodes of nausea and vomiting, but has not had this in about one month.  No confusion.  He is still taking the Lasix and Aldactone.  He initially lost 26 pounds in the first two weeks after starting these in early July, but has remained stable since then. He otherwise notes the feeling of \"a ball in his [abdomen]\" for the past several months, which has gotten more noticeable at his midline.    Examination limited by virtue of video visit  GENERAL: Healthy, alert and no distress  EYES: Eyes grossly normal to inspection.  No discharge or erythema, or obvious scleral/conjunctival abnormalities.  RESP: No visible retractions or increased work of breathing.    SKIN: Visible skin clear. No significant rash, abnormal pigmentation or lesions.  NEURO: Cranial nerves grossly intact.  Mentation and speech appropriate for age.  PSYCH: Mentation appears normal, affect normal/bright, judgement and insight intact, normal speech and appearance well-groomed.      MR Liver 7/29:   In segment 8 is the same 3 cm tumor with out much change in size and characterized as LI-RADS M.  In segment 7 is a 1.7 cm lesion which has uncertain arterial enhancement but has washout and a pseudocapsule and is grown from 1.2 cm a year ago making it a LI-RADS 5.  New in the left lobe of his liver is expansile thrombus in his portal vein which does not clearly have arterial enhancement on my review and then diffuse areas of irregular " enhancement throughout the enlarged left lobe.     Ref. Range 9/7/2021 13:07   Sodium Latest Ref Range: 133 - 144 mmol/L 143   Potassium Latest Ref Range: 3.4 - 5.3 mmol/L 3.8   Chloride Latest Ref Range: 94 - 109 mmol/L 111 (H)   Carbon Dioxide Latest Ref Range: 20 - 32 mmol/L 29   Urea Nitrogen Latest Ref Range: 7 - 30 mg/dL 8   Creatinine Latest Ref Range: 0.66 - 1.25 mg/dL 0.95   GFR Estimate Latest Ref Range: >60 mL/min/1.73m2 87   Calcium Latest Ref Range: 8.5 - 10.1 mg/dL 8.7   Anion Gap Latest Ref Range: 3 - 14 mmol/L 3   Albumin Latest Ref Range: 3.4 - 5.0 g/dL 2.2 (L)   Protein Total Latest Ref Range: 6.8 - 8.8 g/dL 7.0   Bilirubin Total Latest Ref Range: 0.2 - 1.3 mg/dL 3.1 (H)   Alkaline Phosphatase Latest Ref Range: 40 - 150 U/L 127   ALT Latest Ref Range: 0 - 70 U/L 74 (H)   AST Latest Ref Range: 0 - 45 U/L 171 (H)   Alpha Fetoprotein Latest Ref Range: 0.0 - 8.0 ug/L 10.5 (H)   Glucose Latest Ref Range: 70 - 99 mg/dL 149 (H)   WBC Latest Ref Range: 4.0 - 11.0 10e3/uL 5.4   Hemoglobin Latest Ref Range: 13.3 - 17.7 g/dL 12.0 (L)   Hematocrit Latest Ref Range: 40.0 - 53.0 % 35.3 (L)   Platelet Count Latest Ref Range: 150 - 450 10e3/uL 64 (L)   RBC Count Latest Ref Range: 4.40 - 5.90 10e6/uL 3.74 (L)   MCV Latest Ref Range: 78 - 100 fL 94   MCH Latest Ref Range: 26.5 - 33.0 pg 32.1   MCHC Latest Ref Range: 31.5 - 36.5 g/dL 34.0   RDW Latest Ref Range: 10.0 - 15.0 % 14.6   % Neutrophils Latest Units: % 24   % Lymphocytes Latest Units: % 19   % Monocytes Latest Units: % 13   % Eosinophils Latest Units: % 42   Absolute Basophils Latest Ref Range: 0.0 - 0.2 10e3/uL 0.1   % Basophils Latest Units: % 2   Absolute Eosinophils Latest Ref Range: 0.0 - 0.7 10e3/uL 2.2 (H)   Absolute Immature Granulocytes Latest Ref Range: <=0.0 10e3/uL 0.0   Absolute Lymphocytes Latest Ref Range: 0.8 - 5.3 10e3/uL 1.0   Absolute Monocytes Latest Ref Range: 0.0 - 1.3 10e3/uL 0.7   % Immature Granulocytes Latest Units: % 0   Absolute  Neutrophils Latest Ref Range: 1.6 - 8.3 10e3/uL 1.3 (L)   Absolute NRBCs Latest Units: 10e3/uL 0.0   NRBCs per 100 WBC Latest Ref Range: <1 /100 0   INR Latest Ref Range: 0.85 - 1.15  1.49 (H)       Assessment/plan: Poorly compensated cirrhosis due to hepatitis C and prior alcohol use in a patient with a good performance status and multiple liver lesions highly suspicious for hepatocellular carcinoma.  I reviewed prior imaging results with the patient: the smaller lesion on the basis of growth meets Li-RADS criteria for for HCC.  The larger lesion has an appearance to me more typical of a cholangiocarcinoma.  The diffuse infiltrative changes with the expansile thrombus in the portal vein is highly suspicious for an infiltrative HCC with portal vein tumor.    EGD was performed and showed no esophageal varices, however given that we are nearly 2.5 months after his last imaging, I discussed with the patient that we should repeat his MRI to further evaluate his disease, and lab work to reevaluate his liver function this week.  Pending results, I will discuss his case at tumor conference this Friday to see what our team thinks of whether a biopsy of the left lobe to clarify if he does have diffuse infiltrative HCC is warranted.  I will then reconvene with the patient for further discussion to better determine our course of action.    Total time by me today inclusive of the above discussion, review of his internal and external imaging, review of his extensive prior records, coordination of care, ordering and documentation was *** minutes.    Denise Ortiz, MS4  [facility] [employer]      Again, thank you for allowing me to participate in the care of your patient.        Sincerely,        Basim Germain MD

## 2021-10-11 NOTE — LETTER
"    10/11/2021         RE: Dima Olivas  [address]  [facility] [id number]      I am seeing Jigar Olivas today for follow up for possible hepatocellular carcinoma.    He is a 61-year-old gentleman with known hepatitis C and associated cirrhosis who a year ago was noted on imaging to have a greater than 3 cm lesion in the right lobe of his liver with peripheral and progressive enhancement which he had a biopsy demonstrating normal liver.  His alpha-fetoprotein at that time was normal.  He was lost to subsequent follow-up for some time, but returned for f/u this summer.  MRI liver on 7/29 showed multiple liver lesions highly suspicious for hepatocellular carcinoma.  The smaller lesion on the basis of growth meets Li-RADS criteria for for HCC.  The larger lesion has an appearance to me more typical of a cholangiocarcinoma.  The diffuse infiltrative changes with the expansile thrombus in the portal vein is highly suspicious for an infiltrative HCC with portal vein tumor.  Following our initial visit, we made the decision to reconvene following repeat lab work and EGD.  EGD was obtained which showed no esophageal varices, and lab work from 9/7 remained relatively stable to somewhat improved from prior with T bili of 3.1, albumin of 2.2, INR of 1.5.    Interval History:  At present, he reports \"extreme fatigue\" and wanting to sleep all the time, which he has felt for the past year.  He is otherwise feeling well without notable abdominal pain, fevers, chills, or black or bloody stools.  He notes ~monthly episodes of nausea and vomiting, but has not had this in about one month.  No confusion.  He is still taking the Lasix and Aldactone.  He initially lost 26 pounds in the first two weeks after starting these in early July, but has remained stable since then. He otherwise notes the feeling of \"a ball in his [abdomen]\" for the past several months, which has gotten more noticeable at his midline.    Examination " limited by virtue of video visit  GENERAL: Healthy, alert and no distress  EYES: Eyes grossly normal to inspection.  No discharge or erythema, or obvious scleral/conjunctival abnormalities.  RESP: No visible retractions or increased work of breathing.    SKIN: Visible skin clear. No significant rash, abnormal pigmentation or lesions.  NEURO: Cranial nerves grossly intact.  Mentation and speech appropriate for age.  PSYCH: Mentation appears normal, affect normal/bright, judgement and insight intact, normal speech and appearance well-groomed.      MR Liver 7/29:   In segment 8 is the same 3 cm tumor with out much change in size and characterized as LI-RADS M.  In segment 7 is a 1.7 cm lesion which has uncertain arterial enhancement but has washout and a pseudocapsule and is grown from 1.2 cm a year ago making it a LI-RADS 5.  New in the left lobe of his liver is expansile thrombus in his portal vein which does not clearly have arterial enhancement on my review and then diffuse areas of irregular enhancement throughout the enlarged left lobe.     Ref. Range 9/7/2021 13:07   Sodium Latest Ref Range: 133 - 144 mmol/L 143   Potassium Latest Ref Range: 3.4 - 5.3 mmol/L 3.8   Chloride Latest Ref Range: 94 - 109 mmol/L 111 (H)   Carbon Dioxide Latest Ref Range: 20 - 32 mmol/L 29   Urea Nitrogen Latest Ref Range: 7 - 30 mg/dL 8   Creatinine Latest Ref Range: 0.66 - 1.25 mg/dL 0.95   GFR Estimate Latest Ref Range: >60 mL/min/1.73m2 87   Calcium Latest Ref Range: 8.5 - 10.1 mg/dL 8.7   Anion Gap Latest Ref Range: 3 - 14 mmol/L 3   Albumin Latest Ref Range: 3.4 - 5.0 g/dL 2.2 (L)   Protein Total Latest Ref Range: 6.8 - 8.8 g/dL 7.0   Bilirubin Total Latest Ref Range: 0.2 - 1.3 mg/dL 3.1 (H)   Alkaline Phosphatase Latest Ref Range: 40 - 150 U/L 127   ALT Latest Ref Range: 0 - 70 U/L 74 (H)   AST Latest Ref Range: 0 - 45 U/L 171 (H)   Alpha Fetoprotein Latest Ref Range: 0.0 - 8.0 ug/L 10.5 (H)   Glucose Latest Ref Range: 70 - 99  mg/dL 149 (H)   WBC Latest Ref Range: 4.0 - 11.0 10e3/uL 5.4   Hemoglobin Latest Ref Range: 13.3 - 17.7 g/dL 12.0 (L)   Hematocrit Latest Ref Range: 40.0 - 53.0 % 35.3 (L)   Platelet Count Latest Ref Range: 150 - 450 10e3/uL 64 (L)   RBC Count Latest Ref Range: 4.40 - 5.90 10e6/uL 3.74 (L)   MCV Latest Ref Range: 78 - 100 fL 94   MCH Latest Ref Range: 26.5 - 33.0 pg 32.1   MCHC Latest Ref Range: 31.5 - 36.5 g/dL 34.0   RDW Latest Ref Range: 10.0 - 15.0 % 14.6   % Neutrophils Latest Units: % 24   % Lymphocytes Latest Units: % 19   % Monocytes Latest Units: % 13   % Eosinophils Latest Units: % 42   Absolute Basophils Latest Ref Range: 0.0 - 0.2 10e3/uL 0.1   % Basophils Latest Units: % 2   Absolute Eosinophils Latest Ref Range: 0.0 - 0.7 10e3/uL 2.2 (H)   Absolute Immature Granulocytes Latest Ref Range: <=0.0 10e3/uL 0.0   Absolute Lymphocytes Latest Ref Range: 0.8 - 5.3 10e3/uL 1.0   Absolute Monocytes Latest Ref Range: 0.0 - 1.3 10e3/uL 0.7   % Immature Granulocytes Latest Units: % 0   Absolute Neutrophils Latest Ref Range: 1.6 - 8.3 10e3/uL 1.3 (L)   Absolute NRBCs Latest Units: 10e3/uL 0.0   NRBCs per 100 WBC Latest Ref Range: <1 /100 0   INR Latest Ref Range: 0.85 - 1.15  1.49 (H)     Assessment/plan: Poorly compensated cirrhosis due to hepatitis C and prior alcohol use in a patient with a good performance status and multiple liver lesions highly suspicious for hepatocellular carcinoma.  I reviewed prior imaging results with the patient: the smaller lesion on the basis of growth meets Li-RADS criteria for for HCC.  The larger lesion has an appearance to me more typical of a cholangiocarcinoma.  The diffuse infiltrative changes with the expansile thrombus in the portal vein is highly suspicious for an infiltrative HCC with portal vein tumor.    EGD was performed and showed no esophageal varices, however given that we are nearly 2.5 months after his last imaging, I discussed with the patient that we should repeat  his MRI to further evaluate his disease, and lab work to reevaluate his liver function this week.  Pending results, I will discuss his case at tumor conference this Friday to see what our team thinks of whether a biopsy of the left lobe to clarify if he does have diffuse infiltrative HCC is warranted.  I will then reconvene with the patient for further discussion to better determine our course of action.    Denise Ortiz, MS4  [facility] [employer]    I saw the patient in conjunction with the medical student, who served as a scribe for this visit. The above note has been edited by me and I have personally confirmed the history and physical exam and developed the assessment and plan.    Addendum: After discussion at Tumor conference, the consensus was that the bulk of his disease was clearly HCC and whether the one lesion might represent cholangiocarcinoma is not relevant to the expected immediate disease course--particularly given the tumor thrombus and what is probably extensive infiltrative HCC throughout the left lobe.. We will therefore proceed with appropriate therapy for HCC with atezo/lisa. We think his underlying liver disease is well enough compensated and stable over the past couple of months to make this tolerable and beneficial to him. If he has an excellent response of everything other than the one discordant-appearing lesion, we will readdress the issue of whether it is a cholangiocarcinoma at that point.    Total time by me on the date of service inclusive of the above visit, direct review of imaging studies, coordination of care, documentation and ordering was 70 minutes.        Basim Germain MD

## 2021-10-19 PROBLEM — C22.0 HCC (HEPATOCELLULAR CARCINOMA) (H): Status: ACTIVE | Noted: 2021-01-01

## 2021-10-19 NOTE — PROGRESS NOTES
RN Care Coordination Note  Placed call to patient to review plan from liver tumor conference. Team left tumors were grossly HCC. Will plan to proceed with atezo/lisa. Was unable to reach patient. Left detailed message. Asked patient to return call to further review info. [employer] message also sent.        Billie Yates RN, BSN, OCN   RN Care Coordinator   [facility]

## 2021-10-20 NOTE — PROGRESS NOTES
Jigar is a 61 year old who is being evaluated via a billable video visit.      How would you like to obtain your AVS? Melboss  If the video visit is dropped, the invitation should be resent by: Text to cell phone: [phone]  Will anyone else be joining your video visit? No      Video Start Time: 1:35  Video-Visit Details    Type of service:  Video Visit    Video End Time:2:00    Originating Location (pt. Location): Home    Distant Location (provider location):  [facility]     Platform used for Video Visit: [employer] computer    Reason for visit: Hepatocellular carcinoma.    He is a 61-year-old gentleman with known hepatitis C and associated cirrhosis who a year ago was noted on imaging to have a greater than 3 cm lesion in the right lobe of his liver with peripheral and progressive enhancement which he had a biopsy demonstrating normal liver.  His alpha-fetoprotein at that time was normal.  He was lost to subsequent follow-up for some time, but returned for f/u this summer.  MRI liver on 7/29 showed multiple liver lesions highly suspicious for hepatocellular carcinoma.  The smaller lesion on the basis of growth meets Li-RADS criteria for for HCC.  The larger lesion has an appearance to me more typical of a cholangiocarcinoma.  The diffuse infiltrative changes with the expansile thrombus in the portal vein is highly suspicious for an infiltrative HCC with portal vein tumor.  Following our initial visit, we made the decision to reconvene following repeat lab work and EGD.  EGD was obtained which showed no esophageal varices, and lab work from 9/7 remained relatively stable to somewhat improved from prior with T bili of 3.1, albumin of 2.2, INR of 1.5.     His case was reviewed at tumor conference, the consensus was that the bulk of his disease was clearly HCC and whether the one lesion might represent cholangiocarcinoma is not relevant to the expected immediate disease course--particularly given the  tumor thrombus and what is probably extensive infiltrative HCC throughout the left lobe. It was decided that he proceed with appropriate therapy for HCC with atezo/lisa. We think his underlying liver disease is well enough compensated and stable over the past couple of months to make this tolerable and beneficial to him. If he has an excellent response of everything other than the one discordant-appearing lesion, we will readdress the issue of whether it is a cholangiocarcinoma at that point.    Interval History:  Today, he is seen via video visit with his wife.  He states that he overall has not had any changes in his health since he was last seen.  He continues to feel fatigued, and night sweats.  He is not having any pain.  The swelling in his legs has greatly improved, he continues to take Lasix and spironolactone.  He has been able to eat and drink regularly.  His bowels are moving regularly. He denies any nausea or vomiting. He denies any double or blurry vision.       ROS:  Patient denies the following: fevers, body aches, chills, headaches, vision changes, dizziness, chest pain, shortness of breath, nausea, vomiting, diarrhea, constipation, abdominal pain, rashes, bruising/bleeding, pain in the legs.     Current Outpatient Medications   Medication     acetaminophen (TYLENOL) 500 MG tablet     furosemide (LASIX) 20 MG tablet     omeprazole (PRILOSEC) 40 MG DR capsule     spironolactone (ALDACTONE) 50 MG tablet     zinc sulfate (ZINCATE) 220 (50 Zn) MG capsule     No current facility-administered medications for this visit.         Physical exam:  General: Resting comfortably in no acute distress  Head: Normocephalic, atraumatic   EENT: No scleral icteris, EOMS intact.   Lung: Normal respiratory effort. Speaking fluently without shortness of breath. No audible wheezes or coughing.   Neuro: AAO x3. Moving upper extremities equally and symmetrically   Skin: Warm, dry, intact. No rashes, no suspicious lesions. No  petechia or bruising noted on visible skin     The rest of a comprehensive physical examination is deferred due to PHE (public health emergency) video restrictions        Labs:    Most Recent 3 CBC's:  Recent Labs   Lab Test 10/13/21  1514 09/07/21  1307 07/07/21  1504   WBC 6.5 5.4 4.4   HGB 12.0* 12.0* 10.6*   MCV 94 94 102*   PLT 75* 64* 57*    Most Recent 3 BMP's:  Recent Labs   Lab Test 10/13/21  1514 09/07/21  1307 07/07/21  1504    143 144   POTASSIUM 3.6 3.8 3.7   CHLORIDE 107 111* 110*   CO2 25 29 28   BUN 10 8 8   CR 1.01 0.95 0.89   ANIONGAP 5 3 6   HERNESOT 8.8 8.7 8.3*   GLC 99 149* 137*    Most Recent 2 LFT's:  Recent Labs   Lab Test 10/13/21  1514 09/07/21  1307   * 171*   ALT 64 74*   ALKPHOS 135 127   BILITOTAL 4.7* 3.1*      Results for LUCIO LU (MRN [id number]) as of 10/21/2021 15:30   Ref. Range 9/7/2021 13:07 10/13/2021 15:14   Alpha Fetoprotein Latest Ref Range: 0.0 - 8.0 ug/L 10.5 (H) 39.4 (H)       Assessment/plan:    1. Poorly compensated cirrhosis due to hepatitis C and prior alcohol use in a patient with a good performance status and multiple liver lesions consistent with HCC. Reviewed with tumor board as well.     - Plan to proceed with atezolizumab/bevacizumab tomorrow if labs are appropriate.    - Reviewed side effects, including immune mediated toxicities with virus, hepatitis, nephritis, dermatologic changes, colitis, reviewed hypertension, risk of bleeding, risk of clots.   -This will be given every 3 weeks, interval imaging to be determined by Dr. Germain.  - EGD 9/24/2021 was performed and showed no esophageal varices    2. Lower extremity edema  - Currently resolved per patient  - Taking lasix and spironolactone since July       Follow up: Plans to follow up in [place] for future infusion appointments     Patient will call in the interim with any questions or concerns. They voice understanding and agree with the above plan.     34 minutes spent on the date of the  encounter doing chart review, review of test results, interpretation of tests, patient visit and documentation     Keren Crabtree PA-C      ADDENDUM:   10/22- Received page regarding patient's elevated bilirubin 5.3, continues to trend up. Reviewed case with Dr. Germain, who recommends proceeding with treatment today with atezo/lisa, and continue to monitor bilirubin, likely from disease.

## 2021-10-22 NOTE — TELEPHONE ENCOUNTER
PA Initiation    Medication: Lorazepam  Insurance Company: [employer] - Phone [phone] Fax [phone]  Pharmacy Filling the Rx: [place], [place] - [address] [place] [address] [phone]  Filling Pharmacy Phone:    Filling Pharmacy Fax:    Start Date: 10/22/2021

## 2021-10-22 NOTE — NURSING NOTE
Chief Complaint   Patient presents with     Labs Only     venipuncture, vitals checked, PIV placed     Vesta Chung RN on 10/22/2021 at 12:10 PM

## 2021-10-22 NOTE — PROGRESS NOTES
"RN Care Coordination    Met with Dima to discuss immunotherapy and resources available at the [facility]. Provided patient with \"My Cancer Guidebook\", and [facility] printouts on Atezolizumab and Bevacizumab. Reviewed administration, side effects (including nausea/vomiting, diarrhea/constipation and possible IRAE) and ongoing symptom management by APPs in clinic. Provided phone numbers for triage and after hours care. Highlighted steps to expect when getting immunotherapy (check in, labs, pre- meds, infusion), Discussed that chemo treatment may be delayed a day or two due to blood counts, infusion schedule or patient's need to modify. Included a one page resource of symptoms and when to contact the Cancer Clinic with questions or concerns.      Dima signed Authorization to Discuss paperwork. Answered all Dima's questions to his stated satisfaction. Encouraged Jigar to call with any additional questions or concerns.       RADHA CarpenterN, RN  RN Care Coordinator  [facility]                                                                                                                                                       "

## 2021-10-22 NOTE — PROGRESS NOTES
Infusion Nursing Note:  Dima Olivas presents today for Cycle 1 Day 1 Bevacizumab and Tecentriq.    Patient seen by provider today: Yes: CONI Mcduffie yesterday.   present during visit today: Not Applicable.    Note: Patient is new to the infusion room today and is receiving Bevacizumab-bvzr and Tecentriq for the first time.  Patient oriented to infusion room, location of bathrooms and nutrition stations, and call light.  Verified that patient recieved written chemotherapy information previously.  Verbally reviewed chemotherapy teaching, side effects, take-home medications, and follow-up schedule with patient. Patient instructed to call triage with any questions or if he experiences a temperature >100.5, shaking chills, uncontrolled nausea/vomiting/diarrhea, dizziness, shortness of breath, bleeding not relieved with pressure, or with any other concerns.     Pt denies any new concerns or complaints overnight. No s/s of infection.     TORB 1342 CONI Mcduffie / Dana Edward RN  - Okay to proceed with treatment today with Bili of 5.3 and elevated Blood Pressure. May have to start him on a blood pressure medication in the future.     Intravenous Access:  Peripheral IV placed.    Treatment Conditions:  Lab Results   Component Value Date    HGB 11.7 (L) 10/22/2021    WBC 6.3 10/22/2021    ANEUTAUTO 2.4 10/13/2021    PLT 71 (L) 10/22/2021      Lab Results   Component Value Date     10/22/2021    POTASSIUM 3.6 10/22/2021    CR 0.92 10/22/2021    HERNESTO 8.9 10/22/2021    BILITOTAL 5.3 (H) 10/22/2021    ALBUMIN 2.0 (L) 10/22/2021    ALT 86 (H) 10/22/2021     (H) 10/22/2021     Results reviewed, labs did NOT meet treatment parameters: see TORB.    Post Infusion Assessment:  Patient tolerated infusion without incident.  Blood return noted pre and post infusion.  Site patent and intact, free from redness, edema or discomfort.  No evidence of extravasations.  Access discontinued per protocol.      Discharge Plan:   Prescription refills given for Ativan and Compazine.  AVS to patient via [employer].  Patient will return 11/12 for next appointment.   Patient discharged in stable condition accompanied by: self.  Departure Mode: Ambulatory.  Face to Face time: 10.      Dana Edward RN

## 2021-11-01 NOTE — TELEPHONE ENCOUNTER
Azezo/Liz one week ago. Infusion on 10/22/21    Has had the following issues since infusion:  Abdominal Pain:  Describes it as miserable.  Abdomen is distended, hard, and painful.  Constipated x 7 days. Is passing gas, but it is painful.  Went a little bit this morning and it was dry, hard and small.   Recommendations: Use Senna, 1-4 tabs twice per day  Miralax once per day, hold if loose stools.  Drink 8-10 glasses clear liquid per day.  Exercise--walk around house every hour.  Drink hot beverages.    Urination:  Having urination issues, painful with urination.   Very hard to start a stream.  Urine looks like yellow gaffney, no blood  Feels a lot of pressure to start stream but has to go frequently.  Drinking 4-5 glasses of liquids daily.   Recommendations: Advised pt increase to 8-10 glasses per day.   UA order entered.    Nausea:  Nauseated a few times since infusion. Vomited several times since infusion. No N/V today. Does not feel like eating, but food tastes fine.   Recommendations: Reviewed how to take antiemetics and alternate for better coverage, take 20 minutes before meal.  Small, frequent meals.    Other Symptoms:  Fatigued--has difficulty sleeping. Discussed using ativan before sleep.  SOB when walking 30 feet which is not usual.  Not exercising as much.   Feels like he has the flu  Not able to take temp because he does not have thermometer. Occasional chills, but not today.  Hiccoughs constantly during the day and night.   Coughing occasionally, some clear phlegm once in a while.    Jaundice:  Wife reports color looks goldish-orange and like he has an orange tan. Whites of eyes are yellow. This has been going on for several days.     A: Informed pt that I would be reaching out to providers to discuss. Provider may want pt to be seen in ED.   Paged RIGO Mcduffie who spoke with Dr. Germain and they would like pt to be evaluated in the ED.    Call made to pt and advised he go to the Emergency Department  for evaluation. Encouraged pt to go to the [facility] because  all of his oncology providers are here but he insisted on [facility] instead.     Call made to [place] ED. Gave report to Misti, Charge nurse.    R: Routed to Provider and Care Team.

## 2021-11-01 NOTE — TELEPHONE ENCOUNTER
Received voicemail from patient stating he had first infusion of Atezo/Liz about a week ago. Reports he has not been feeling well. Having increased pain. Routing to [facility] triage.     Billie Yates RN, BSN, OCN   RN Care Coordinator   [facility]

## 2021-11-02 PROBLEM — K72.00 ACUTE LIVER FAILURE WITHOUT HEPATIC COMA: Status: ACTIVE | Noted: 2021-01-01

## 2021-11-02 PROBLEM — R18.8 OTHER ASCITES: Status: ACTIVE | Noted: 2021-01-01

## 2021-11-02 PROBLEM — N17.9 ACUTE KIDNEY INJURY (H): Status: ACTIVE | Noted: 2021-01-01

## 2021-11-02 NOTE — CODE/RAPID RESPONSE
"Rapid Response Team Note    Assessment   In assessment a rapid response was called on Dima Olivas due to SIRS/Sepsis trigger and lactic acidosis. LA 2.1. This presentation is likely due to liver disease and possible infection.    Plan   -  Blood culture x 2  -  Repeat LA at 1900  -  Albumin with paracentesis  -  Recommend considering abx (not ordered by me)  -  Recommend echocardiogram given systolic murmur not previously documented in our system - though documented in outpatient [facility] visit - and no echocardiogram outpatient I can find (not ordered by me)    -  The [facility]  primary team visited the patient and is in agreement with plan.  -  Disposition: The patient will remain on the current unit. We will continue to monitor this patient closely.  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 Resulting LA draw 2.1.    MELD-Na score: 37 at 11/2/2021  6:44 AM  MELD score: 36 at 11/2/2021  6:44 AM  Calculated from:  Serum Creatinine: 2.82 mg/dL at 11/2/2021  6:44 AM  Serum Sodium: 128 mmol/L at 11/2/2021  6:44 AM  Total Bilirubin: 25.3 mg/dL at 11/2/2021  6:44 AM  INR(ratio): 1.92 at 11/2/2021  6:44 AM  Age: 61 years    Admission Diagnosis:   Other ascites [R18.8]  Acute kidney injury (H) [N17.9]  Acute liver failure without hepatic coma [K72.00]  Acute biliary pancreatitis, unspecified complication status " [K85.10]     Physical Exam   Temp: (!) 96.1  F (35.6  C) Temp  Min: 96  F (35.6  C)  Max: 97.7  F (36.5  C)  Resp: 18 Resp  Min: 16  Max: 18  SpO2: 98 % SpO2  Min: 98 %  Max: 100 %  Pulse: 76 Pulse  Min: 75  Max: 85    No data recorded  BP: 125/54 Systolic (24hrs), Av , Min:125 , Max:166   Diastolic (24hrs), Av, Min:40, Max:82     I/Os: No intake/output data recorded.     Exam:   General: chronically ill appearing. Not in acute distress  Mental Status: baseline mental status.  Resp: LS clear upper lobes, decreased bases  CV: systolic murmur loudest aortic area. S1, s2, RRR    Significant Results and Procedures   Lactic Acid:   Recent Labs   Lab Test 217 21  1607   LACT 2.6* 2.1*     CBC:   Recent Labs   Lab Test 21  1747 21  0644 10/22/21  1209   WBC 13.7* 13.4* 6.3   HGB 7.5* 8.1* 11.7*   HCT 23.3* 25.7* 35.1*   PLT 98* 135* 71*        Sepsis Evaluation   The patient is not known to have an infection.  NO EVIDENCE OF SEPSIS at this time.  Vital sign, physical exam, and lab findings are due to liver disease.

## 2021-11-02 NOTE — CONSULTS
Oncology Consult Note     Dima Olivas MRN# [id number]   Age: 61 year old YOB: 1960   Date of Admission: November 2, 2021       Reason For Consult:           History of Present Illness:   History obtained from chart review and confirmed with patient.  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 LI-RADS scored 3 for 2 lesions, M for one and  5 for the fourth lesion. His case was discussed in tumor board and thought to be HCC on cirrhotic liver (child Jang score B, 9 points at that time) as background therefore started systemic treatment with bevacizumab and atezolizumab on 10/22/2021 by Dr. Germain.    Patient presented to the hospital with increased abdominal size with worsening pain and jaundice.   Up to his wife patient started to feel poorly in 2-3 days after infusion.  He had fever 1 day after infusion, other than this he denied any fever or chills.  His wife has not noticed any obvious confusion but more fatigue.  No sign or symptoms of bleeding.  Also complaining of shortness of breath on lying flat, no chest pain or cough.  No urine symptoms.  Up to the record patient still drinking alcohol.     His labs in the ED showed elevated lipase in 6327 range, total bilirubin 25.3, mostly direct at 20.2.  ALT and AST in range 170-180.  CBC showed important drop in his hemoglobin to 8.1 from 11.7 on 10/22/2021.  WBC 13.4 and platelets 135 from 71 on 10/22. Positive creatinine 2.8 from normal 0.9 on 10/22.       Patient had EGD in the past and no esophageal varices.  However he reported having black stool multiple times last event was a week ago.  No hemoptysis.      Review of Systems:10 system were reviewed and all negative except what mentioned in the HPI.        Oncology  History:     From Onc clinic note 10/21/21  He is a 61-year-old gentleman with known hepatitis C and associated cirrhosis who a year ago was noted on imaging to have a greater than 3 cm lesion in the right lobe of his liver with peripheral and progressive enhancement which he had a biopsy demonstrating normal liver.  His alpha-fetoprotein at that time was normal.  He was lost to subsequent follow-up for some time, but returned for f/u this summer.  MRI liver on 7/29 showed multiple liver lesions highly suspicious for hepatocellular carcinoma.  The smaller lesion on the basis of growth meets Li-RADS criteria for for HCC.  The larger lesion has an appearance to me more typical of a cholangiocarcinoma.  The diffuse infiltrative changes with the expansile thrombus in the portal vein is highly suspicious for an infiltrative HCC with portal vein tumor.  Following our initial visit, we made the decision to reconvene following repeat lab work and EGD.  EGD was obtained which showed no esophageal varices, and lab work from 9/7 remained relatively stable to somewhat improved from prior with T bili of 3.1, albumin of 2.2, INR of 1.5.      His case was reviewed at tumor conference, the consensus was that the bulk of his disease was clearly HCC and whether the one lesion might represent cholangiocarcinoma is not relevant to the expected immediate disease course--particularly given the tumor thrombus and what is probably extensive infiltrative HCC throughout the left lobe. It was decided that he proceed with appropriate therapy for HCC with atezo/lisa. We think his underlying liver disease is well enough compensated and stable over the past couple of months to make this tolerable and beneficial to him. If he has an excellent response of everything other than the one discordant-appearing lesion, we will readdress the issue of whether it is a cholangiocarcinoma at that point.         Past Medical History:     Past Medical History:  "  Diagnosis Date     Cirrhosis (H) 01/2020     Hepatitis C 2009     Hx of intravenous drug use in remission     quit 30 yrs ago     Smoker           Past Surgical History:     Past Surgical History:   Procedure Laterality Date     ESOPHAGOSCOPY, GASTROSCOPY, DUODENOSCOPY (EGD), COMBINED N/A 9/24/2021    Procedure: ESOPHAGOGASTRODUODENOSCOPY (EGD);  Surgeon: Salvador Covington MD;  [place] [facility] [place]     IR LIVER BIOPSY PERCUTANEOUS  3/11/2020     NO HISTORY OF SURGERY            Social History:     Social History     Tobacco Use     Smoking status: Current Every Day Smoker     Packs/day: 0.30     Types: Cigarettes     Smokeless tobacco: Never Used   Substance Use Topics     Alcohol use: Yes     Alcohol/week: 7.0 standard drinks     Types: 7 Cans of beer per week     Comment: 1-2 drinks a day      Drug use: No           Family History:     Family History   Problem Relation Age of Onset     Unknown/Adopted Mother      Unknown/Adopted Father           Allergies:   No Known Allergies       Medications:   (Not in a hospital admission)         Physical Exam:     /60   Pulse 76   Temp 97.7  F (36.5  C) (Oral)   Resp 16   Ht 1.778 m (5' 10\")   Wt 88.9 kg (196 lb)   SpO2 99%   BMI 28.12 kg/m    Vitals:    11/02/21 0621   Weight: 88.9 kg (196 lb)     General: Appears well, not in acute distress.  Jaundice and tired  HEENT: NCAT. PERRL, EOMI, icteric sclera. Oral mucosa pink and moist with no lesions or thrush.  Neck: Neck supple. No jugular venous distention.  Respiratory: Non-labored breathing, good air exchange, lungs: clear in both lungs.  Cardiovascular: RRR. No murmur or rub.   Gastrointestinal: Distended, some tenderness  EXT: +1 pitting edema   Skin: Jaundice  Neurologic: A&O x 3, speech normal.           Data:   CBC  Recent Labs   Lab 11/02/21  0644   WBC 13.4*   RBC 2.66*   HGB 8.1*   HCT 25.7*   MCV 97   MCH 30.5   MCHC 31.5   RDW 17.3*   *     CMP  Recent Labs   Lab 11/02/21  0644   * "   POTASSIUM 4.2   CHLORIDE 95   CO2 23   ANIONGAP 10   *   BUN 75*   CR 2.82*   GFRESTIMATED 23*   HERNESTO 7.6*   PROTTOTAL 6.1*   ALBUMIN 1.5*   BILITOTAL 25.3*   ALKPHOS 183*   *   *     INR  Recent Labs   Lab 11/02/21  0644   INR 1.92*        Imaging      Results for orders placed or performed during the hospital encounter of 11/02/21   US Abdomen Limited    Narrative    EXAMINATION: US ABDOMEN LIMITED, 11/2/2021 7:56 AM     COMPARISON: MR Abdomen 10/13/2021    HISTORY: New ascites    TECHNIQUE: Gray scale ultrasound of the abdomen.    FINDINGS:  A targeted ultrasound of all 4 abdominal quadrants was performed and  demonstrated. Free fluid in all 4 abdominal quadrants.      Impression    IMPRESSION: Ascites in all 4 abdominal quadrants.    I have personally reviewed the examination and initial interpretation  and I agree with the findings.    STALIN PONCE MD         SYSTEM ID:  [id number]   US Abdomen Limited w Abd/Pelvis Duplex Complete    Narrative    EXAMINATION: US ABDOMEN LIMITED WITH DOPPLER COMPLETE 11/2/2021 9:53  AM     COMPARISON: Same day ascites check, MRI 10/13/2021    HISTORY: Worsening liver failure    Additional information from the EMR: 61-year-old male with history of  HCC status post one round of chemotherapy. Presents to the emergency  Department with worsening abdominal pain and distention.    TECHNIQUE: The abdomen was scanned in standard fashion with  specialized ultrasound transducer(s) using both gray-scale, color  Doppler, and spectral flow techniques.    Findings:  Liver: The liver demonstrates coarsened and heterogeneous parenchymal  echotexture with nodular contours. In the right hepatic lobe is a  heterogeneous lesion that measures 3.2 x 2.8 x 3.2 cm, correlating  with the malignant-appearing lesion in segment VII seen on the  10/13/2021 MRI.     A recanalized periumbilical vein is seen with flow of 27 cm/s.    Extrahepatic portal vein flow is retrograde at 19  cm/s.  Right portal vein flow is retrograde, measuring 16 cm/s.  Left portal vein flow is retrograde, measuring 17 cm/s.    Flow in the hepatic artery is towards the liver and:  112 cm/s peak systolic  0.82 resistive index.     The splenic vein is patent and flow is towards the liver.  There is  nonocclusive thrombus in the main portal vein and left portal vein.  The IVC is patent with flow towards the heart.   The visualized aorta  is not dilated.    Gallbladder: Stones and sludge within the gallbladder. Borderline  gallbladder wall thickness measuring 3 mm, negative sonographic Raymond  sign.    Bile Ducts: Both the intra- and extrahepatic biliary system are of  normal caliber.  The common bile duct measures 3 mm.    Pancreas: Visualized portions of the head and body of the pancreas are  unremarkable. Pancreas is partially obscured.    Kidneys: The right kidney is of normal echotexture, without mass or  hydronephrosis.   Renal length: 12.8 cm. Simple right renal cyst  measuring up to 1.4 cm.    Fluid: Moderate to large volume ascites.      Impression    Impression:   1. Cirrhotic liver morphology with moderate to large volume ascites.  2. Heterogeneous mass in the right lobe of liver correlating with the  HCC seen on the prior MRI.  3. Retrograde flow of the main, left, and right portal veins. Patent  hepatic arteries.  4. Nonocclusive thrombus in the main and left portal veins, correlates  with the malignant-appearing thrombosis seen on prior MRI.  5. Cholelithiasis with sludge and borderline gallbladder wall  thickness, likely secondary to ascites and chronic liver disease.    I have personally reviewed the examination and initial interpretation  and I agree with the findings.    STALIN PONCE MD         SYSTEM ID:  [id number]            Assessment and Plan:   Dima Olivas is a 61 year old gentleman with history of liver cirrhosis secondary to HCV/ETOH, recent discovered liver (four) lesions and tumor  thrombus with unremarkable biopsy and  MRI suggestive to HCC vs cholangiocarcinoma but discussed in tumor board and thought to be HCC more likely with cirrhotic liver picture (child Jang score B, 9 points at that time) who started systemic treatment with bevacizumab (No esophageal varises on EGD) and atezolizumab on 10/22/2021 by Dr. Germain, who presented with new increased abdominal size with worsening pain and jaundice.     His labs in the ED showed elevated lipase in 6327 range, total bilirubin 25.3, mostly direct 20.2.  ALT and AST 3-4 folds 170-180.  CBC showed important drop in his hemoglobin to 8.1 from 11.7 on 10/22/2021 with no sign/sym of bleeding.  Positive creatinine 2.8 from normal 0.9 on 10/22.   Ultrasound in the ED showed cirrhotic liver with large ascites and none occlusive tumor thrombus (known before) of the main and left portal vein, chronic cholelithiasis with some sludge.    Acute liver cirrhosis decompensation with new large ascites, worsening jaundice (possibly obstructive)  Elevated lipase suggestive to acute pancreatitis, pending imaging  Liver cirrhosis secondary to HCV and EtOH  Multiple liver lesion suggestive to HCC with systemic treatment atezolizumab and bevacizumab started on 10/22/2021  Hepatorenal syndrome-MICHELLE  Acute blood loss anemia with unknown source or etiology, bleeding has to rule out  Reactive leukocytosis    We discussed with the patient and primary team that bevacizumab may associated with acute bleed and thrombosis as well.  With significant drop in hemoglobin we are worried about bleeding vs Budd-Chiari syndrome versus other etiology therefore we will have CT scan triphasic with contrast which is higher yield than without contrast.  We discussed giving contrast with radiology and primary team because of current creatinine at 2.8 from normal.  Also we would like to have nephrology on board. Patient will benefit from paracentesis to alleviate pressure on urinary system,  and albumin replacing in context of hepatorenal syndrome.     Recommendations:   -Triphasic CT abdomen pelvis with contrast   -Consult nephrology for hepatorenal syndrome  -Appreciate GI input for possible obstructive jaundice and associated pancreatitis  -Hold any scheduled systemic therapy (atezolizumab/bevacizumab) for HCC  -Paracentesis and replace albumin  -We will update the outpatient oncology team after completion of the above work-up    Patient was seen and examined by my attending (Dr.GUPTA [facility] ) and I. A/P were created as mentioned above.     Arielle Woody MD  Hematology&Oncology Fellow   Pager: [phone]  November 2, 2021

## 2021-11-02 NOTE — H&P
"[facility]    History and Physical - [facility] Service        Date of Admission:  11/2/2021    Assessment & Plan      Dima Olivas is a 61 year old male admitted on 11/2/2021. 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       #Cirhossis 2/2 EtOH + HCV  #Acute decompensation of cirrhosis   #Ascites  #Portal hypertensive gastropathy   #Coagulopathy   MELD-Na score: 37 at 11/2/2021  6:44 AM  MELD score: 36 at 11/2/2021  6:44 AM  Calculated from:  Serum Creatinine: 2.82 mg/dL at 11/2/2021  6:44 AM  Serum Sodium: 128 mmol/L at 11/2/2021  6:44 AM  Total Bilirubin: 25.3 mg/dL at 11/2/2021  6:44 AM  INR(ratio): 1.92 at 11/2/2021  6:44 AM  Age: 61 years  Presenting with acute abdominal pain, new ascites (reported first episode). On spironolactone 50, lasix 20 at home. AOx3. Decompensation may be 2/2 to continued alcohol use vs progression of HCC. INR 1.92, likely 2/2 acute synthetic dysfunction. CT Chest/Abd/Pelv significant for blood in peritoneal cavity 2/2 to hepatic hemorrhage from HCC lesion. Likely acute contributing to decompensation. Denies melena, or hematochezia.  EGD 9/24/2021 w/o evidence of varices, but notable for portable hypertensive gastropathy. Concern for SBP given acute presentation with large ascites. Paracentesis significant for yessy, bloody output 500mL. Paracentesis labs pending.  Pt and wife informed of brevity of the situation, and are open to palliative discussion for goals of care.     -s/p paracentesis    >fluid studies, gram stain, cell count, differential, cultures  -Day 1 albumin 1.5mg/kg    >Day 3 1.5mg/kg to follow \"  -UA, Bcx x2, CXR   -Vit K challenge, 5 mg SQ or " "PO x3 days  -IV Zosyn q6 (renal adjustment)   -Dilaudid 0.3mg q4hr PRN   -Lactulose TID   -HOLD spironolactone   -HOLD lasix  -PTA PPI   -Daily MELD labs   -Hepatology following, appreciate recs  -Palliative consult, appreciate recs       #Acute Pancreatitis   #BISAP 1   Arrived to ED with complaints of lower abdominal pain in the setting of acute cirrhosis decompensation. Lipase elevated 6k. First noted abdominal pain days following 1st round of chemotherapy atezolizumab/nevacozimab, noted that he noted feeling \"worse\" following chemotherapy. Most likely 2/2 acute alcohol use. History of stones but no CBD dilatation. Non-specific history of possible biliary colic. No history of abdominal trauma. No new medications.     - NPO for treatment of pancreatitis  - Goal of 0.5-1cc/kg/hour urine output)   >Albumin 25% 100g   -Dilaudid 0.3mg q4hr PRN    -Avoid medications associated with pancreatitis (most commonly, loop diuretics, certain antibiotics, mesalamines, immunomodulators, valproic acid).      #MICHELLE   New MICHELLE, baseline ~0.92. Elevated acutely 2.82 in the setting of new ascites. Reported poor urine output even with continued use of lasix/spirinolactone. No dysuria, no hematuria. RUQ US does not illustrate any hydronephrosis. Intravascular hypovolemia in the setting of third spacing possibility. Must consider that pt may be developing Type 1, and less likely Type 2, HRS given the acute decompensation cirrhosis. Additionally, acute elevation in bili may be causing acute hyperbili nephrotoxicity.    -S/p 100g 25% albumin  -UA, eval for casts   -Repeat CMP AM   -Renal consulted, appreciate recs    #Hyponatremia   New hypoNa 128, decreased from 10/22 at 140. Reports few episode of emesis. Approximately 10 day history of generalized anorexia, with intermittent non-blood emesis. Poor PO intake. In the setting of ascites, and volume overload per physical exam (JVD, mild crackles in lung bases bilaterally) and MICHELLE. Concern " for intravascular hypovolemia hypoNa vs hypervolemic hypoNa. Must consider that pt may be developing Type 1, and less likely Type 2, HRS given the acute decompensation cirrhosis.     -S/p 100g 25% albumin  -Urine Osm  -Urine Na  -Random urea nitrogen with creat ratio  -Repeat CMP AM   -Renal consulted, appreciate recs.     #HCC   HCC 2/2 untreated HCV, presumably from history of IV drug use per patient. S/p 1 dose chemotherapy with Bevacizumab and Tecentriq on 10/22/2021.     -Oncology following, appreciate recx    #Acute Blood Loss Anemia   #Normocytic anemia   #Hepatic hemorrhage  New 3gm Hgb drop 10/22 -> now. 11.7 --> 8.1. Denies symptoms of palpitations, lightheadedness, pre-syncope, or dizziness. Vitals stable, no hypotension, tachycardia, or dyspnea. CT significant for 3.8 x 6.8 x 7.3 cm hematoma in the anterior abdomen  extending inferiorly from large mass in segment 2/3 of the liver.  No  active extravasation is demonstrated on CT scan. Likely 2/2 hemorrhagic HCC lesion. Paracentesis frankly bloody.     -Hgb 2300  -Transfusion <7   >Pt consented  -Stat CT if pt illustrates acute Hgb drop concern of bleeding.  -AM CBC      #Transaminitis   At time of infusion 10/22, transaminits 86/182 ALT > AST. CMP 10/13 , ALT 64. Chronic AST elevation.  New significant ALT elevation 184, . In the setting of     -AM CMP       #Acute on chronic Juandice  New worsened hyperbilirubinemia. ~2 1mo-2wks ago. RUQ US negative for acute obstruction with stone, or dilation of biliary tree. Discussion with panc/bili GI, unlikely obstructive cause of jaundice without observed dilation of biliary tree. Still actively using alcohol, approximately 5-6 drinks per week decreased 1 drink since symptoms started 1 weeks ago. Jaundice through abdomen, acute pruritis.  Given alcohol use history, may be from alcoholic hepatitis.     #Polysubstance Use   #AUD  #Nicotine Use   Long standing alcohol use, and nicotine use. Recently cut  "down from 5-6 drinks per night to 1 during acute illness past 10 days. Has never tried to quit in the past, is now interested. Has cut down from 1ppd to 1/3ppd in regards to nicotine. Open to speaking to chem dep.Last drink of alcohol approximately 24 hours ago.     -Chem dep consult, appreciate recs  -Nicotine patch in place.   -CIWA protocol     #Acute Deconditioning  #Malnutrition   Acute deconditioning and malnutrition in setting of catabolic state of cirrhosis. Presently NPO for acute pancreatitis, but plan for high protein diet when he can tolerate.     -IP nutrition consult, appreciate recs  -PT consult, appreciate recs        Diet:  NPO   DVT Prophylaxis: Pneumatic Compression Devices  Yanes Catheter: Not present  Fluids: None 100g 25% albumin   Central Lines: None  Code Status:  Full Code       Disposition Plan   Expected discharge:  recommended to prior living arrangement once renal function improved.     The patient's care was discussed with the Attending Physician, Dr. Tee.    Benji Agrawal MD  [address]  Securely message with the Vocera Web Console (learn more here)  Text page via e[employer] by [employer] Paging/Directory    Please see sign in/sign out for up to date coverage information  ______________________________________________________________________    Chief Complaint    \"I've had worsening abdominal pain, and then I turned jaundiced\"    History is obtained from the patient, electronic health record and patient's spouse    History of Present Illness   Dima Olivas is a 61 year old male who has a history of cirrhosis 2/2 alcohol use and untreated Hep C and HCC s/p 1st round of chemotherapy Bevacizumab and Tecentriq on 10/22/2021  and is admitted for acute cirrhosis decompensation in the setting of hepatic hemorrhage likely 2/2 HCC lesion presenting with significant hyperbili, MICHELLE, ABLA, and pancreatitis.     Following 1st round of chemo 10/22 " noted to have new abdominal pain the followin, lower quadrants. Progressed, and then noted new anorexia as well jaundice in the following days. Over past 10 days has noted new, slowly growing abdominal distension. Denies past history of ascites, SBP, hepatic encephalopathy, variceal hemorrhage. Denies acute melena, or hematemesis. Has had associated nausea with emesis, bilious emesis. Has had some subjective fevers, no home temperature and chills. Long standing history of night sweats unchanged. No changes in medications, abdominal trauma, recent travel, or biliary colic. Confirms pruritis. No confusion.     Decrease in urine output at approximately the same time. Previously noted increased output following diuretic use, no longer experiencing. Denies  Changes in LE edema. Denies hematuria, dysuria, or increased frequency with smaller volumes.     Last EGD 9/24. No past history of colonoscopy. Tried alleve 1x for abdominal pain, and 1500mg tylenol last night 11/1/2021 without improvement.     Still consuming alcohol.Since onset of symptoms 10 days ago, decreased consumption to 1 drink/night. Previously 5-6drinks/night. Has never attempted to quit in the past. Denies history of withdrawal. Last drink last night. Smokes 1/3 ppd.     Review of Systems    The 10 point Review of Systems is negative other than noted in the HPI or here.    Past Medical History    I have reviewed this patient's medical history and updated it with pertinent information if needed.   Past Medical History:   Diagnosis Date     Cirrhosis (H) 01/2020     Hepatitis C 2009     Hx of intravenous drug use in remission     quit 30 yrs ago     Smoker         Past Surgical History   I have reviewed this patient's surgical history and updated it with pertinent information if needed.  Past Surgical History:   Procedure Laterality Date     ESOPHAGOSCOPY, GASTROSCOPY, DUODENOSCOPY (EGD), COMBINED N/A 9/24/2021    Procedure: ESOPHAGOGASTRODUODENOSCOPY (EGD);   Surgeon: Salvador Covington MD;  [place] [facility] [place]IVER BIOPSY PERCUTANEOUS  3/11/2020     NO HISTORY OF SURGERY         Social History   I have reviewed this patient's social history and updated it with pertinent information if needed. Dima Olivas  reports that he has been smoking cigarettes. He has been smoking about 0.30 packs per day. He has never used smokeless tobacco. He reports current alcohol use of about 7.0 standard drinks of alcohol per week. He reports that he does not use drugs.    Family History   I have reviewed this patient's family history and updated it with pertinent information if needed.  Family History   Problem Relation Age of Onset     Unknown/Adopted Mother      Unknown/Adopted Father        Prior to Admission Medications   Prior to Admission Medications   Prescriptions Last Dose Informant Patient Reported? Taking?   LORazepam (ATIVAN) 0.5 MG tablet 11/1/2021 at hs Self No Yes   Sig: Take 1 tablet (0.5 mg) by mouth every 4 hours as needed (Anxiety, Nausea/Vomiting or Sleep)   furosemide (LASIX) 20 MG tablet 11/1/2021 at am Self No Yes   Sig: TAKE ONE TABLET BY MOUTH EVERYDAY.   omeprazole (PRILOSEC) 40 MG DR capsule 10/31/21 at am Self No Yes   Sig: Take 1 capsule (40 mg) by mouth daily   prochlorperazine (COMPAZINE) 10 MG tablet  at prn Self No Yes   Sig: Take 1 tablet (10 mg) by mouth every 6 hours as needed (Nausea/Vomiting)   spironolactone (ALDACTONE) 50 MG tablet 11/1/2021 at am Self No Yes   Sig: TAKE ONE TABLET BY MOUTH EVERY DAY   zinc sulfate (ZINCATE) 220 (50 Zn) MG capsule Not Taking at Unknown time Self No No   Sig: Take 1 capsule (220 mg) by mouth 2 times daily   Patient not taking: Reported on 11/2/2021      Facility-Administered Medications: None     Allergies   No Known Allergies    Physical Exam   Vital Signs: Temp: 97.7  F (36.5  C) Temp src: Oral BP: 134/58 Pulse: 80   Resp: 16 SpO2: 100 %      Weight: 196 lbs 0 oz    Physical Exam  Constitutional:        General: He is not in acute distress.  HENT:      Head: Normocephalic and atraumatic.      Mouth/Throat:      Mouth: Mucous membranes are moist.   Eyes:      General: Scleral icterus present.      Comments: miosis   Neck:      Vascular: JVD present.   Cardiovascular:      Rate and Rhythm: Normal rate and regular rhythm.      Pulses:           Radial pulses are 3+ on the right side and 3+ on the left side.      Heart sounds: Murmur heard.   Gallop present. S4 sounds present.       Comments: Hyperdynamic   Pulmonary:      Effort: Pulmonary effort is normal.      Breath sounds: Rales present.   Abdominal:      General: Abdomen is protuberant. There is distension.      Palpations: There is hepatomegaly.      Tenderness: There is no abdominal tenderness. There is no guarding.   Musculoskeletal:      Right lower leg: Edema present.      Left lower leg: Edema present.   Skin:     General: Skin is warm and dry.      Capillary Refill: Capillary refill takes less than 2 seconds.      Coloration: Skin is jaundiced.   Neurological:      Mental Status: He is alert.         Data   Data reviewed today: I reviewed all medications, new labs and imaging results over the last 24 hours. I personally reviewed the abdominal CT image(s) showing acute hemmorhage, with no active extravasation .    Recent Labs   Lab 11/02/21  0644   WBC 13.4*   HGB 8.1*   MCV 97   *   INR 1.92*   *   POTASSIUM 4.2   CHLORIDE 95   CO2 23   BUN 75*   CR 2.82*   ANIONGAP 10   HERNESTO 7.6*   *   ALBUMIN 1.5*   PROTTOTAL 6.1*   BILITOTAL 25.3*   ALKPHOS 183*   *   *   LIPASE 6,327*     Recent Results (from the past 24 hour(s))   US Abdomen Limited    Narrative    EXAMINATION: US ABDOMEN LIMITED, 11/2/2021 7:56 AM     COMPARISON: MR Abdomen 10/13/2021    HISTORY: New ascites    TECHNIQUE: Gray scale ultrasound of the abdomen.    FINDINGS:  A targeted ultrasound of all 4 abdominal quadrants was performed and  demonstrated. Free fluid  in all 4 abdominal quadrants.      Impression    IMPRESSION: Ascites in all 4 abdominal quadrants.    I have personally reviewed the examination and initial interpretation  and I agree with the findings.    STALIN PONCE MD         SYSTEM ID:  [id number]   US Abdomen Limited w Abd/Pelvis Duplex Complete    Narrative    EXAMINATION: US ABDOMEN LIMITED WITH DOPPLER COMPLETE 11/2/2021 9:53  AM     COMPARISON: Same day ascites check, MRI 10/13/2021    HISTORY: Worsening liver failure    Additional information from the EMR: 61-year-old male with history of  HCC status post one round of chemotherapy. Presents to the emergency  Department with worsening abdominal pain and distention.    TECHNIQUE: The abdomen was scanned in standard fashion with  specialized ultrasound transducer(s) using both gray-scale, color  Doppler, and spectral flow techniques.    Findings:  Liver: The liver demonstrates coarsened and heterogeneous parenchymal  echotexture with nodular contours. In the right hepatic lobe is a  heterogeneous lesion that measures 3.2 x 2.8 x 3.2 cm, correlating  with the malignant-appearing lesion in segment VII seen on the  10/13/2021 MRI.     A recanalized periumbilical vein is seen with flow of 27 cm/s.    Extrahepatic portal vein flow is retrograde at 19 cm/s.  Right portal vein flow is retrograde, measuring 16 cm/s.  Left portal vein flow is retrograde, measuring 17 cm/s.    Flow in the hepatic artery is towards the liver and:  112 cm/s peak systolic  0.82 resistive index.     The splenic vein is patent and flow is towards the liver.  There is  nonocclusive thrombus in the main portal vein and left portal vein.  The IVC is patent with flow towards the heart.   The visualized aorta  is not dilated.    Gallbladder: Stones and sludge within the gallbladder. Borderline  gallbladder wall thickness measuring 3 mm, negative sonographic Raymond  sign.    Bile Ducts: Both the intra- and extrahepatic biliary system are  of  normal caliber.  The common bile duct measures 3 mm.    Pancreas: Visualized portions of the head and body of the pancreas are  unremarkable. Pancreas is partially obscured.    Kidneys: The right kidney is of normal echotexture, without mass or  hydronephrosis.   Renal length: 12.8 cm. Simple right renal cyst  measuring up to 1.4 cm.    Fluid: Moderate to large volume ascites.      Impression    Impression:   1. Cirrhotic liver morphology with moderate to large volume ascites.  2. Heterogeneous mass in the right lobe of liver correlating with the  HCC seen on the prior MRI.  3. Retrograde flow of the main, left, and right portal veins. Patent  hepatic arteries.  4. Nonocclusive thrombus in the main and left portal veins, correlates  with the malignant-appearing thrombosis seen on prior MRI.  5. Cholelithiasis with sludge and borderline gallbladder wall  thickness, likely secondary to ascites and chronic liver disease.    I have personally reviewed the examination and initial interpretation  and I agree with the findings.    STALIN PONCE MD         SYSTEM ID:  [id number]

## 2021-11-02 NOTE — ED PROVIDER NOTES
ED Provider Note  [facility]      History     Chief Complaint   Patient presents with     Abdominal Pain     HPI  Dima Olivas is a 61 year old male who has a history of hepatocellular carcinoma he has had 1 run of chemotherapy.  He has had progressively worsening abdominal pain and distention to the point now where it is quite severe his jaundice is worse and he is more distended.  He called his clinic in the was told to go to the emergency department.  They also report that he has been constipated no fevers or vomiting.    Past Medical History  Past Medical History:   Diagnosis Date     Cirrhosis (H) 01/2020     Hepatitis C 2009     Hx of intravenous drug use in remission     quit 30 yrs ago     Smoker      Past Surgical History:   Procedure Laterality Date     ESOPHAGOSCOPY, GASTROSCOPY, DUODENOSCOPY (EGD), COMBINED N/A 9/24/2021    Procedure: ESOPHAGOGASTRODUODENOSCOPY (EGD);  Surgeon: Salvador Covington MD;  [place] [facility] [place]     IR LIVER BIOPSY PERCUTANEOUS  3/11/2020     NO HISTORY OF SURGERY       furosemide (LASIX) 20 MG tablet  LORazepam (ATIVAN) 0.5 MG tablet  omeprazole (PRILOSEC) 40 MG DR capsule  prochlorperazine (COMPAZINE) 10 MG tablet  spironolactone (ALDACTONE) 50 MG tablet  zinc sulfate (ZINCATE) 220 (50 Zn) MG capsule      No Known Allergies  Family History  Family History   Problem Relation Age of Onset     Unknown/Adopted Mother      Unknown/Adopted Father      Social History   Social History     Tobacco Use     Smoking status: Current Every Day Smoker     Packs/day: 0.30     Types: Cigarettes     Smokeless tobacco: Never Used   Substance Use Topics     Alcohol use: Yes     Alcohol/week: 7.0 standard drinks     Types: 7 Cans of beer per week     Comment: 1-2 drinks a day      Drug use: No      Past medical history, past surgical history, medications, allergies, family history, and social history were reviewed with the patient. No additional pertinent items.   "     Review of Systems   Constitutional: Positive for appetite change and fatigue.   Respiratory: Negative.    Cardiovascular: Negative.    Gastrointestinal: Positive for abdominal distention and abdominal pain.   Genitourinary: Negative.    Musculoskeletal: Negative.    Skin: Positive for color change.   All other systems reviewed and are negative.    A complete review of systems was performed with pertinent positives and negatives noted in the HPI, and all other systems negative.    Physical Exam   BP: (!) 153/82  Pulse: 85  Temp: 97.7  F (36.5  C)  Resp: 16  Height: 177.8 cm (5' 10\")  Weight: 88.9 kg (196 lb)  SpO2: 100 %  Physical Exam  Vitals and nursing note reviewed.   Constitutional:       General: He is in acute distress.   HENT:      Mouth/Throat:      Mouth: Mucous membranes are dry.   Eyes:      General: Scleral icterus present.   Cardiovascular:      Rate and Rhythm: Normal rate and regular rhythm.      Heart sounds: Normal heart sounds.   Pulmonary:      Effort: Pulmonary effort is normal.      Breath sounds: Normal breath sounds.   Abdominal:      General: There is distension.      Palpations: There is fluid wave.      Tenderness: There is generalized abdominal tenderness.      Comments: Large volume ascites clinically   Skin:     Coloration: Skin is jaundiced.   Neurological:      General: No focal deficit present.      Mental Status: He is alert and oriented to person, place, and time.   Psychiatric:         Mood and Affect: Mood normal.         Behavior: Behavior normal.       ED Course      Procedures       Discussed with GI fellow possible need for ERCP given clinical picture that looks like a distal obstruction.    MELD-Na score: 37 at 11/2/2021  6:44 AM  MELD score: 36 at 11/2/2021  6:44 AM  Calculated from:  Serum Creatinine: 2.82 mg/dL at 11/2/2021  6:44 AM  Serum Sodium: 128 mmol/L at 11/2/2021  6:44 AM  Total Bilirubin: 25.3 mg/dL at 11/2/2021  6:44 AM  INR(ratio): 1.92 at 11/2/2021  6:44 " AM  Age: 61 years       Results for orders placed or performed during the hospital encounter of 11/02/21   Basic metabolic panel     Status: Abnormal   Result Value Ref Range    Sodium 128 (L) 133 - 144 mmol/L    Potassium 4.2 3.4 - 5.3 mmol/L    Chloride 95 94 - 109 mmol/L    Carbon Dioxide (CO2) 23 20 - 32 mmol/L    Anion Gap 10 3 - 14 mmol/L    Urea Nitrogen 75 (H) 7 - 30 mg/dL    Creatinine 2.82 (H) 0.66 - 1.25 mg/dL    Calcium 7.6 (L) 8.5 - 10.1 mg/dL    Glucose 132 (H) 70 - 99 mg/dL    GFR Estimate 23 (L) >60 mL/min/1.73m2   Hepatic panel     Status: Abnormal   Result Value Ref Range    Bilirubin Total 25.3 (HH) 0.2 - 1.3 mg/dL    Bilirubin Direct 20.2 (H) 0.0 - 0.2 mg/dL    Protein Total 6.1 (L) 6.8 - 8.8 g/dL    Albumin 1.5 (L) 3.4 - 5.0 g/dL    Alkaline Phosphatase 183 (H) 40 - 150 U/L     (H) 0 - 45 U/L     (H) 0 - 70 U/L   INR     Status: Abnormal   Result Value Ref Range    INR 1.92 (H) 0.85 - 1.15   CBC with platelets and differential     Status: Abnormal   Result Value Ref Range    WBC Count 13.4 (H) 4.0 - 11.0 10e3/uL    RBC Count 2.66 (L) 4.40 - 5.90 10e6/uL    Hemoglobin 8.1 (L) 13.3 - 17.7 g/dL    Hematocrit 25.7 (L) 40.0 - 53.0 %    MCV 97 78 - 100 fL    MCH 30.5 26.5 - 33.0 pg    MCHC 31.5 31.5 - 36.5 g/dL    RDW 17.3 (H) 10.0 - 15.0 %    Platelet Count 135 (L) 150 - 450 10e3/uL    % Neutrophils 78 %    % Lymphocytes 4 %    % Monocytes 12 %    % Eosinophils 4 %    % Basophils 0 %    % Immature Granulocytes 2 %    NRBCs per 100 WBC 0 <1 /100    Absolute Neutrophils 10.6 (H) 1.6 - 8.3 10e3/uL    Absolute Lymphocytes 0.5 (L) 0.8 - 5.3 10e3/uL    Absolute Monocytes 1.6 (H) 0.0 - 1.3 10e3/uL    Absolute Eosinophils 0.5 0.0 - 0.7 10e3/uL    Absolute Basophils 0.0 0.0 - 0.2 10e3/uL    Absolute Immature Granulocytes 0.2 (H) <=0.0 10e3/uL    Absolute NRBCs 0.0 10e3/uL   Extra Red Top Tube     Status: None   Result Value Ref Range    Hold Specimen JIC    Lipase     Status: Abnormal   Result  Value Ref Range    Lipase 6,327 (H) 73 - 393 U/L   CBC with platelets differential     Status: Abnormal    Narrative    The following orders were created for panel order CBC with platelets differential.  Procedure                               Abnormality         Status                     ---------                               -----------         ------                     CBC with platelets and d...[[id number]]  Abnormal            Final result                 Please view results for these tests on the individual orders.   [place] Draw     Status: None    Narrative    The following orders were created for panel order [place] Draw.  Procedure                               Abnormality         Status                     ---------                               -----------         ------                     Extra Red Top Tube[[id number]]                               Final result                 Please view results for these tests on the individual orders.     Medications   HYDROmorphone (PF) (DILAUDID) injection 0.5 mg (0.5 mg Intravenous Given 11/2/21 0721)   ondansetron (ZOFRAN) injection 4 mg (has no administration in time range)   morphine (PF) injection 4 mg (has no administration in time range)        Assessments & Plan (with Medical Decision Making)   61-year-old male with hepatitis C hepatocellular carcinoma and hep C related cirrhosis with acute worsening of clinical condition including development of ascites and worsening jaundice he now has a meld score of 37 but also has acute pancreatitis.  Concern is for a distal biliary obstruction Case discussed with GI who will follow along will admit to internal medicine he is in stable condition.    I have reviewed the nursing notes. I have reviewed the findings, diagnosis, plan and need for follow up with the patient.    New Prescriptions    No medications on file       Final diagnoses:   Acute kidney injury (H)   Acute liver failure without hepatic coma   Other  ascites   Acute biliary pancreatitis, unspecified complication status       --  Will Ziegler MD  [facility] EMERGENCY DEPARTMENT  11/2/2021     Will Ziegler MD  11/02/21 0863

## 2021-11-02 NOTE — PROVIDER NOTIFICATION
"\"FYI - Pt lactic recheck 2.6. Albumin currently infusing. Let me know if you would like any further interventions. Thanks!\"  "

## 2021-11-02 NOTE — CONSULTS
Consult and Procedure Service - Procedure Note    Attending: Dr. Zacarias  Resident: Dr. Oneill  Procedure: Diagnostic and therapeutic paracentesis  Indication: decompensated cirrhosis with ascites  Risk Assessment: low risk  Pre-procedure diagnosis: ascites  Post-procedure diagnosis: bloody ascites    The risks and benefits of the procedure were explained to Jigar Olivas who expressed understanding and opted to proceed.  Consent was obtained and placed in the chart.  A time out was performed.  An area of ascites was located and marked using ultrasound guidance in the right lower quadrant; the area was prepped and draped in the usual sterile fashion.  8 ml of 1% lidocaine was instilled and ascites located.  Given bloody appearance of the fluid after a small aspiration with the lidocaine syringe, the region was imaged with doppler ultrasound to look for any evidence of blood vessels in the region and none were identified. The 5F pigtail paracentesis catheter and needle were inserted under real-time guidance until ascites obtained then the needle removed and the catheter advanced.  The apparatus was connected to a vacuum bottle and a total of 500 ml of uncoagulated, red colored fluid removed (see picture under media tab). A specimen was sent for analysis. The catheter was withdrawn and the area dressed.  Patient tolerated the procedure well with no immediate complications.  Please contact the Consult and Procedure Service if any complications or concerns arise.     Olivia Oneill MD  PGY-4, Internal Medicine-Pediatrics    DOS:  November 2, 2021

## 2021-11-02 NOTE — PROVIDER NOTIFICATION
11/02/21 1600   Call Information   Date of Call 11/02/21   Time of Call 1623   Name of person requesting the team Shae Johns   Title of person requesting team RN   RRT Arrival time 1624   Time RRT ended 1643   Reason for call   Type of RRT Adult   Primary reason for call Sepsis suspected   Sepsis Suspected Elevated Lactate level   Was patient transferred from the ED, ICU, or PACU within last 24 hours prior to RRT call? Yes   SBAR   Situation Lactic Acid 2.1, WBC 13.4   Background Hep C/ alcohol use/ cirrhosis/ multifocal HCC with known portal vein thrombosis, EGD clean recently, never ascites, never HE, stated atezolizumab/ bevacizumab first dose approx 10 days ago, coming in with abdominal pain and distension   Notable History/Conditions Cancer;End-Stage disease   Assessment A+O x 4, jaundiced. Denies fever, chills, C.P., SOB, n/v.  Afebrile with vss. Easy respirations on RA with 02 sats upper 90's. Distended/firm abdomen.     Interventions Labs;Meds;Portable monitor   Adjustments to Recommend Albumin, Thoracentesis.   Patient Outcome   Patient Outcome Stabilized on unit   RRT Team   Attending/Primary/Covering Physician Linsey Hernandez NP   Date Attending Physician notified 11/02/21   Time Attending Physician notified 1623   Physician(s) Heme/Onc team notified.   Lead RN Reshma Johns   RT none   Post RRT Intervention Assessment   Post RRT Assessment Stable/Improved   Date Follow Up Done 11/02/21   Time Follow Up Done 1830   Comments Paracentesis completed with 500ml output. Afebrile with vss.  Repeat lactic acid 2.6.  Continue monitoring closely for s/s sepsis.

## 2021-11-02 NOTE — CONSULTS
Gastroenterology Consultation  Dima Olivas [id number] 61 year old 1960 11/2/2021        Date of Admission: 11/2/2021  Requesting physician: Will Portillo*       Reason for Consultation:   Pancreatitis, elevated LFTs         Assessment and Plan:   Dima Olivas is a 61 year old male history of HCV/EtOH cirrhosis diagnosed 1/2020 complicated by HCC with tumor thrombus in the portal vein and extensive infiltrate in the left lobe (started on a atezolizumab/bevacizumab on 10/22) who presented with worsening abdominal pain distention found to have worsening ascites, elevated LFTs and Lipase 6000.    # Pancreatitis - BISAP score 1 (BUN). Likely 2/2 to alcohol or medications related (atezolizumab/bevacizumab). Lasix can also be a cause but less likely given this is not a new medication and the patient is still using alcohol.   # Elevated LFTs - Markedly elevated Tbili with Dbili predominant. Possibly due to ETOH hepatitis in the settings of infiltrative disease of HCC in the left lobe. SOPHIA without biliary dilation.   # MICHELLE - HRS vs prerenal  # ETOH/HCV cirrhosis c/b HCC s/p atezolizumab/bevacizumab on 10/22 x1 - MELD Na 37. Worsening ascites can be from ETOH hepatitis or new PVT. At risk for SBP. Currently not showing signs of other complications such as HE or GI bleed.         Recommendations:   # Cirrhosis and elevated LFTs  - Paracentesis and send fluid for cytology, gram stain and culture.  - Hepatology consult will follow the patient.     # Pancreatitis  - NPO for treatment of pancreatitis  - IVF rehydration with lactated ringers with goal of 0.5-1cc/kg/hour urine output)  - Anti-emetics, analgesics & electrolyte repletion per primary team  - Can advance diet once pain improved. If recurrent pain w/ PO diet - then resume NPO  - Avoid medications associated with pancreatitis (most commonly, loop diuretics, certain antibiotics, mesalamines, immunomodulators, valproic acid).    It has been a  pleasure to participate in the care of this patient.  Patient discussed with GI staff, Dr. Weiss.  Please do not hesitate to contact the GI service with any questions or concerns.     Masoud Lamar MD  Gastroenterology Fellow  [facility]  Text page [phone]           HPI:   Dima Olivas is a 61 year old male history of HCV/EtOH cirrhosis diagnosed 1/2020 complicated by HCC with tumor thrombus in the portal vein and extensive infiltrate in the left lobe (started on a atezolizumab/bevacizumab on 10/22) who presented with worsening abdominal pain distention.  Prior to the infusion on 10/22, the patient was doing somewhat okay at home with ascites controlled with diuretics Lasix and spironolactone.  Appetite was fair.  Since the infusion, abdominal distention worsened with pain and jaundice.  Appetite was poor and also has been constipated in the past 7 days.  He denied fever/chills/nausea/vomiting/bleeding signs/confusion.  However the patient has been drinking alcohol in the past few months.  Averaging about 2-3 drinks 2 or 3 times a week. No new medications.     In the ED, vital signs were stable.  CBC and BMP notable for WBC 13, sodium 128 BUN 75 creatinine 2.8.  All of these are worse than baseline.  LFTs notable for T bili 20 5D bili 20   alk phos 183. Tbili was 5 a few days ago.  Limited ultrasound and LAD show diffuse ascites (MRI on 10/13 showed no ascites).           Past Medical History:   Reviewed and edited as appropriate  Past Medical History:   Diagnosis Date     Cirrhosis (H) 01/2020     Hepatitis C 2009     Hx of intravenous drug use in remission     quit 30 yrs ago     Smoker             Past Surgical History:   Reviewed and edited as appropriate   Past Surgical History:   Procedure Laterality Date     ESOPHAGOSCOPY, GASTROSCOPY, DUODENOSCOPY (EGD), COMBINED N/A 9/24/2021    Procedure: ESOPHAGOGASTRODUODENOSCOPY (EGD);  Surgeon: Salvador Covington MD;  Location: [facility] [place]      IR LIVER BIOPSY PERCUTANEOUS  3/11/2020     NO HISTORY OF SURGERY              Medications:     Current Facility-Administered Medications   Medication     HYDROmorphone (PF) (DILAUDID) injection 0.5 mg     morphine (PF) injection 4 mg     ondansetron (ZOFRAN) injection 4 mg     pantoprazole (PROTONIX) EC tablet 40 mg     Current Outpatient Medications   Medication Sig     furosemide (LASIX) 20 MG tablet TAKE ONE TABLET BY MOUTH EVERYDAY.     LORazepam (ATIVAN) 0.5 MG tablet Take 1 tablet (0.5 mg) by mouth every 4 hours as needed (Anxiety, Nausea/Vomiting or Sleep)     omeprazole (PRILOSEC) 40 MG DR capsule Take 1 capsule (40 mg) by mouth daily     prochlorperazine (COMPAZINE) 10 MG tablet Take 1 tablet (10 mg) by mouth every 6 hours as needed (Nausea/Vomiting)     spironolactone (ALDACTONE) 50 MG tablet TAKE ONE TABLET BY MOUTH EVERY DAY     zinc sulfate (ZINCATE) 220 (50 Zn) MG capsule Take 1 capsule (220 mg) by mouth 2 times daily (Patient not taking: Reported on 11/2/2021)            Allergies    No Known Allergies         Social History:   Reviewed and edited as appropriate  Social History     Socioeconomic History     Marital status:      Spouse name: Not on file     Number of children: Not on file     Years of education: Not on file     Highest education level: Not on file   Occupational History     Not on file   Tobacco Use     Smoking status: Current Every Day Smoker     Packs/day: 0.30     Types: Cigarettes     Smokeless tobacco: Never Used   Substance and Sexual Activity     Alcohol use: Yes     Alcohol/week: 7.0 standard drinks     Types: 7 Cans of beer per week     Comment: 1-2 drinks a day      Drug use: No     Sexual activity: Not on file   Other Topics Concern     Parent/sibling w/ CABG, MI or angioplasty before 65F 55M? Not Asked   Social History Narrative     Not on file     Social Determinants of Health     Financial Resource Strain:      Difficulty of Paying Living Expenses:    Food  "Insecurity:      Worried About Running Out of Food in the Last Year:      Ran Out of Food in the Last Year:    Transportation Needs:      Lack of Transportation (Medical):      Lack of Transportation (Non-Medical):    Physical Activity:      Days of Exercise per Week:      Minutes of Exercise per Session:    Stress:      Feeling of Stress :    Social Connections:      Frequency of Communication with Friends and Family:      Frequency of Social Gatherings with Friends and Family:      Attends [employer] Services:      Active Member of Clubs or Organizations:      Attends Club or Organization Meetings:      Marital Status:    Intimate Partner Violence:      Fear of Current or Ex-Partner:      Emotionally Abused:      Physically Abused:      Sexually Abused:            Family History:   Reviewed and edited as appropriate  Family History   Problem Relation Age of Onset     Unknown/Adopted Mother      Unknown/Adopted Father       No known history of colorectal cancer, liver disease, or inflammatory bowel disease.         Review of Systems:   A complete 10 point review of systems was obtained.  Please see the HPI for pertinent positives and negatives.  All other systems were reviewed and were found to be negative.          Physical Exam:   VS:  /58   Pulse 80   Temp 97.7  F (36.5  C) (Oral)   Resp 16   Ht 1.778 m (5' 10\")   Wt 88.9 kg (196 lb)   SpO2 100%   BMI 28.12 kg/m      Wt:   Wt Readings from Last 2 Encounters:   11/02/21 88.9 kg (196 lb)   10/22/21 83 kg (183 lb)      Constitutional: cooperative, pleasant, no acute distress  Eyes: Conjunctiva icteric  HEENT: Normal oropharynx without ulcers or exudate, mucus membranes moist  CV: No Kell edema  Respiratory: Breathing comfortably on ambient air  Abd: distended, + fluid wave, +bs, no hepatosplenomegaly, nontender, no rebound or guarding   Skin: warm, perfused, no jaundice  Neuro: A&O x 3, No asterixis         Laboratory:   BMP  Recent Labs   Lab " 11/02/21  0644   *   POTASSIUM 4.2   CHLORIDE 95   HERNESTO 7.6*   CO2 23   BUN 75*   CR 2.82*   *     CBC  Recent Labs   Lab 11/02/21  0644   WBC 13.4*   RBC 2.66*   HGB 8.1*   HCT 25.7*   MCV 97   MCH 30.5   MCHC 31.5   RDW 17.3*   *     INR  Recent Labs   Lab 11/02/21  0644   INR 1.92*     Liver Enzymes  Recent Labs   Lab 11/02/21  0644   ALKPHOS 183*   *   *   BILITOTAL 25.3*   PROTTOTAL 6.1*   ALBUMIN 1.5*      PANC  Recent Labs   Lab 11/02/21  0644   LIPASE 6,327*       Imaging/Procedures/Studies:

## 2021-11-02 NOTE — ED TRIAGE NOTES
Pt presents to the ED for evaluation of abdominal distention and new onset of jaundice. The pt is currently being treated for liver cancer, and had his first chemo infusion on 10/22. Since his infusion, the pt has progressively more jaundiced, distended, and SOB.

## 2021-11-02 NOTE — CONSULTS
[facility]    Hepatology consult note      Consult requested by Dr. Agrawal     Primary hepatologist: Dr. Parish      61 year old man with previously compensated cirrhosis 2/2 untreated HCV/ETOH and infiltrative HCC who presents with Jaundice.    Chief complaint: Jaundice    HPI:  Mr. Olivas is a 61-year-old man with previously compensated cirrhosis 2/2 HCV/ETOH and infiltrative HCC who was biopsied and then lost to follow-up.    He previously followed with Dr. Parish in 2020 and then Ms. Prema VILA back in August.  At that point he denied drinking since 2020.    The notes mention untreated hepatitis C diagnosed in 2009, I am unable to find that in the labs but multiple notes do mention this. Last EGD 9/24/2021 without varices. He was on Lasix 20 / Spironolactone 50 mg for lower extremity edema.    Remote history of IV drug use and intranasal drug use in the 1980s.    He started Bevacizumab and Tecentriq on 10/22/2021 (day 1) for his HCC.  Within the next few days, he developed progressive abdominal distension and pain.  Then jaundice and icterus, causing him to present for evaluation.    He reports prior to 10/22 he was drinking 1.75 L of vodka every 3 days.  Then, since then he has not felt well and has had maybe 1-2 drinks of vodka 2 or 3 times per week.    In the ED, Cr was 2.82 (from 0.92), , , , Bilirubin 25.3, Lipase 6327, WBC 13.4, Hgb 8.1, . Denies fever or chills.    He lives with his wife and 2 adult children.      Medical hx Surgical hx   Past Medical History:   Diagnosis Date     Cirrhosis (H) 01/2020     Hepatitis C 2009     Hx of intravenous drug use in remission      Smoker       Past Surgical History:   Procedure Laterality Date     ESOPHAGOSCOPY, GASTROSCOPY, DUODENOSCOPY (EGD), COMBINED N/A 9/24/2021    Procedure: ESOPHAGOGASTRODUODENOSCOPY (EGD);  Surgeon: Salvador Covington MD;  [place] [facility] [place]     [place] LIVER BIOPSY PERCUTANEOUS  3/11/2020      "NO HISTORY OF SURGERY            Medications  Prior to Admission medications    Medication Sig Start Date End Date Taking? Authorizing Provider   furosemide (LASIX) 20 MG tablet TAKE ONE TABLET BY MOUTH EVERYDAY. 10/21/21  Yes Yasmin Gayle MD   LORazepam (ATIVAN) 0.5 MG tablet Take 1 tablet (0.5 mg) by mouth every 4 hours as needed (Anxiety, Nausea/Vomiting or Sleep) 10/22/21  Yes Basim Germain MD   omeprazole (PRILOSEC) 40 MG DR capsule Take 1 capsule (40 mg) by mouth daily 9/24/21 12/23/21 Yes Salvador Covington MD   prochlorperazine (COMPAZINE) 10 MG tablet Take 1 tablet (10 mg) by mouth every 6 hours as needed (Nausea/Vomiting) 10/22/21  Yes Basim Germain MD   spironolactone (ALDACTONE) 50 MG tablet TAKE ONE TABLET BY MOUTH EVERY DAY 10/21/21  Yes Yasmin Gayle MD   zinc sulfate (ZINCATE) 220 (50 Zn) MG capsule Take 1 capsule (220 mg) by mouth 2 times daily  Patient not taking: Reported on 11/2/2021 8/6/21   Romy Lloyd PA-C       Allergies  No Known Allergies    Family hx Social hx   Family History   Problem Relation Age of Onset     Unknown/Adopted Mother      Unknown/Adopted Father       Social History     Tobacco Use     Smoking status: Current Every Day Smoker     Packs/day: 0.30     Types: Cigarettes     Smokeless tobacco: Never Used   Substance Use Topics     Alcohol use: Yes     Alcohol/week: 7.0 standard drinks     Types: 7 Cans of beer per week     Comment: 1-2 drinks a day      Drug use: No          Review of systems  A 10-point review of systems was negative.    Examination  /58   Pulse 80   Temp 97.7  F (36.5  C) (Oral)   Resp 16   Ht 1.778 m (5' 10\")   Wt 88.9 kg (196 lb)   SpO2 100%   BMI 28.12 kg/m    Body mass index is 28.12 kg/m .    Constitutional:   HEENT: Lying in bed, appears chronically ill  CV: 3+ edema to the knee  Respiratory: Unlabored breathing  Abdomen:   Distended abdomen and tense, positive fluid " wave  Non-tender  Skin: Jaundiced  Neuro: Alert, moves all extremities, no asterixis.  Psych: Normal affect, answers questions appropriately.    Laboratory  Cr was 2.82 (from 0.92), , , , Bilirubin 25.3, Lipase 6327, WBC 13.4, Hgb 8.1, .    MELD-Na score: 37 at 11/2/2021  6:44 AM  MELD score: 36 at 11/2/2021  6:44 AM  Calculated from:  Serum Creatinine: 2.82 mg/dL at 11/2/2021  6:44 AM  Serum Sodium: 128 mmol/L at 11/2/2021  6:44 AM  Total Bilirubin: 25.3 mg/dL at 11/2/2021  6:44 AM  INR(ratio): 1.92 at 11/2/2021  6:44 AM  Age: 61 years     Radiology  US abdomen 11/2/2021  Impression:   1. Cirrhotic liver morphology with moderate to large volume ascites.  2. Heterogeneous mass in the right lobe of liver correlating with the  HCC seen on the prior MRI.  3. Retrograde flow of the main, left, and right portal veins. Patent  hepatic arteries.  4. Nonocclusive thrombus in the main and left portal veins, correlates  with the malignant-appearing thrombosis seen on prior MRI.  5. Cholelithiasis with sludge and borderline gallbladder wall  thickness, likely secondary to ascites and chronic liver disease.    Assessment  Mr. Olivas is a 61 year old man with decompensated cirrhosis due to HCV/ETOH (ascites) with ongoing alcohol use and infiltrative HCC on bevacizumab/atezolizumab and hepatology is consulted in the context of jaundice.    He presents with jaundice and abdominal pain in the context of decompensated HCV/ETOH cirrhosis with infiltrative HCC. This seems most likely to be decompensation of his liver disease, in the setting of active alcohol use or malignancy.  Atezolizumab has been associated with pancreatitis in 1% of cases, but I do not suspect this to be the primary  here.    We would recommend paracentesis with studies as below to exclude SBP, hydration per primary/pancreas team for possible pancreatitis.  If this is SBP, he would need albumin but we do not know that yet. Would  send broad infectious studies (UA / BCx / CXR) and hold the diuretics.  Based on his known liver disease, despite an elevated discriminate function, we would not recommend steroids at this point.  US showed patent hepatic arteries and non-occlusive thrombus in the main and left portal vein as seen previously.    We will discuss with oncology regarding his immunotherapy - potentially the extremely elevated bilirubin might be a barrier.    Overall, his MELD Na of 37 confers an extremely high mortality with >70% in 3 months unless things rapidly improve, this is on top of diffuse HCC and severe kidney injury.  We conveyed the grave nature of his disease and recommend palliative care be involved to help identify his goals of care.    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)    Summary recommendations:  - Please obtain paracentesis, either diagnostic or limited to 3 L removed due to MICHELLE  And send fluid studies, gram stain, cell count, differential, cultures, albumin, total protein, concurrent serum albumin  - Please resuscitate with albumin 1.5 g/kg today and 1g on Thursday in case this is SBP  - Would cover with cephalosporin in case this is SBP  - Please send urine cultures / blood cultures / chest x-ray if not yet done  - Hold his diuretics for now (spironolactone / lasix)  - Vit K challenge, 5 mg SQ or PO x3 days  - Please consult palliative care to help with goals of care given grave nature of his overall medical state  - Additional fluids as per panc/bili but we do not suspect acute pancreatitis to be the main  here    #. Decompensated  Cirrhosis: Cirrhosis 2/2 HCV/ETOH c/b HCC  MELD-Na 37  Child-Jang Class C  Followed in hepatology clinic by Dr. Parish    #. Ascites   a. History of SBP: none   b. Diuretics Spironolactone 50 mg po and Lasix 20 mg po - HOLD for now   c. Paracentesis either diagnostic or limited to 3 L with 6g albumin per L removed due to MICHELLE  And send fluid studies, gram stain, cell count, differential, cultures,   d. Low sodium diet (< 2g) to reduce ascites and fluid.   e. Monitor weights qdaily, monitor for fluid accumulation    #. Varices   a. EGD 9/24/2021 with no varices    #. HCC (liver cancer)   # Infiltrative HCC with tumor thrombus in portal venous system   # Cycle 1 Day 1 Bevacizumab and Tecentriq on 10/22/2021   a. MRI liver without and with contrast 7/29/2021 -   - Segment VIII, there is a 3.2 cm T1 hyperintensive rim demonstrating progressive enhancement LIRADS M  - Segment VII, there is a 17 mm arterial enhancing lesion, LIRADS 5  - Segment VIII, there is a 14 mm enhancing lesion LIRADS 2  - Interval increase in left lateral segment with diffuse increased T2 signal with heterogenous enhancement throughout the left hepatic lobe, concerning for diffuse infiltrative HCC with tumor thrombus in the portal venous system    #. Nutritional Status   a. Recommend high protein calorie intake for malnutrition (1.1-1.5g/kg per day)   b. Consider nutrition consult inpatient    #. Deconditioning   a. Consider PT consult inpatient    #. Health Maintenance   a. Tylenol < 2g per 24 hours   b. Avoid NSAIDs (given risk of HRS and peptic ulcer disease/GIB)   c. Avoid sedating medications, such as opioids and benzodiazepines, as able.    Salvador Oleary MD  Gastroenterology Fellow, PGY-5    Staffed with attending, Dr. Radford.    The patient was seen and examined.  The above assessment and plan was developed jointly with the fellow.      Ray Radford MD

## 2021-11-03 NOTE — PROGRESS NOTES
[facility]    Progress Note - Marcynthia 1 Service        Date of Admission:  11/2/2021    Assessment & Plan            Dima Olivas is a 61 year old male admitted on 11/2/2021. 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     Today:  S/p 2upRBCs   >trasnfuse hgb <7  Hgb q8hrs  Daily MELD labs   Dilaudid 0.3mg q2hrs  Received #2 100g albumin   Advance to CLD   IR consulted   Palliative consulted       #Cirhossis 2/2 EtOH + HCV  #Acute decompensation of cirrhosis   #Ascites  #Portal hypertensive gastropathy   #Coagulopathy   MELD-Na score: 40 at 11/3/2021  7:45 AM  MELD score: 40 at 11/3/2021  7:45 AM  Calculated from:  Serum Creatinine: 3.77 mg/dL at 11/3/2021  7:45 AM  Serum Sodium: 128 mmol/L at 11/3/2021  7:45 AM  Total Bilirubin: 24.4 mg/dL at 11/3/2021  7:45 AM  INR(ratio): 2.21 at 11/3/2021  7:45 AM  Age: 61 years    Presenting with acute abdominal pain, new ascites (reported first episode). On spironolactone 50, lasix 20 at home. AOx3. Decompensation may be 2/2 to continued alcohol use vs progression of HCC. INR 1.92, likely 2/2 acute synthetic dysfunction. CT Chest/Abd/Pelv significant for blood in peritoneal cavity 2/2 to hepatic hemorrhage from HCC lesion. Likely acute and causing this acute decompensation. Denies melena, or hematochezia.  EGD 9/24/2021 w/o evidence of varices, but notable for portable hypertensive gastropathy. Concern for SBP given acute presentation with large ascites, per 250/PMN unlikely SBP Paracentesis significant for yessy, bloody output 500mL.   Pt and wife informed of brevity of the situation, and are open to palliative discussion for goals of care.     -s/p  "paracentesis    >unlikely SBP based on correction, culture NGTD  -Repeat albumin 100g 11/3   -Vit K challenge, 5 mg SQ or PO x2/3 days  -IV Zosyn q6 (renal adjustment)   -Dilaudid 0.3mg q2hr PRN   -Lactulose TID   -HOLD spironolactone   -HOLD lasix  -PTA PPI   -Daily MELD labs   -Hgb q8 hrs   -Hepatology following, appreciate recs  -Palliative consult, appreciate recs     #Acute Pancreatitis   #BISAP 1   Arrived to ED with complaints of lower abdominal pain in the setting of acute cirrhosis decompensation. Lipase elevated 6k. First noted abdominal pain days following 1st round of chemotherapy atezolizumab/nevacozimab, noted that he noted feeling \"worse\" following chemotherapy. Most likely 2/2 acute alcohol use. History of stones but no CBD dilatation. Non-specific history of possible biliary colic. No history of abdominal trauma. No new medications.     - Advance to CLD  - Goal of 0.5-1cc/kg/hour urine output)   >Albumin 25% 100g   -Dilaudid 0.3mg q2hr PRN    -Avoid medications associated with pancreatitis (most commonly, loop diuretics, certain antibiotics, mesalamines, immunomodulators, valproic acid).      #MICHELLE   New MICHELLE, baseline ~0.92. Elevated acutely 2.82 in the setting of new ascites. Reported poor urine output even with continued use of lasix/spirinolactone. No dysuria, no hematuria. RUQ US does not illustrate any hydronephrosis. Intravascular hypovolemia in the setting of third spacing possibility. Must consider that pt may be developing Type 1, and less likely Type 2, HRS given the acute decompensation cirrhosis. Additionally, acute elevation in bili may be causing acute hyperbili nephrotoxicity. Reports some improvement of urine output following albumin, but Cr worsening now 3.77 concern for progression.     -S/p 100g 11/3/2021  -Repeat 100g albumin 11/4   -UA, eval for casts   -AM CMP    -Renal consulted, appreciate recs    #Hyponatremia   New hypoNa 128, decreased from 10/22 at 140. Reports few episode " of emesis. Approximately 10 day history of generalized anorexia, with intermittent non-blood emesis. Poor PO intake. In the setting of ascites, and volume overload per physical exam (JVD, mild crackles in lung bases bilaterally) and MICHELLE. Concern for intravascular hypovolemia hypoNa vs hypervolemic hypoNa. Must consider that pt may be developing Type 1, and less likely Type 2, HRS given the acute decompensation cirrhosis. Urine Na 7, Urine osms 370.     -AM CMP   -Strict I/Os   -Daily weights   -Renal consulted, appreciate recs.     #HCC   HCC 2/2 untreated HCV, presumably from history of IV drug use per patient. S/p 1 dose chemotherapy with Bevacizumab and Tecentriq on 10/22/2021.     -Oncology following, appreciate recs      #Acute Blood Loss Anemia   #Normocytic anemia   #Hepatic hemorrhage  New 3gm Hgb drop 10/22 -> now. 11.7 --> 8.1 --> 6.3. S/p 1 uPRBC 6.9 with second in process. Denies symptoms of palpitations, lightheadedness, pre-syncope, or dizziness. Vitals stable, no hypotension, tachycardia, or dyspnea. CT significant for 3.8 x 6.8 x 7.3 cm hematoma in the anterior abdomenextending inferiorly from large mass in segment 2/3 of the liver.  Noactive extravasation is demonstrated on CT scan. Likely 2/2 hemorrhagic HCC lesion. Paracentesis frankly bloody. IR following - if continued decrease in Hgb can consider embolization with risk of worsening liver function.     -Hgb q8hrs   -Transfusion <7   >Pt consented  -Stat CT if pt illustrates acute Hgb drop concern of bleeding.  -AM CBC      #Transaminitis   At time of infusion 10/22, transaminits 86/182 ALT > AST. CMP 10/13 , ALT 64. Chronic AST elevation.  New significant ALT elevation 184, . In the setting of diminished liver parenchyma given cirrhosis, still may be acute alcoholic pancreatitis given the especially elevated bili.     -AM CMP       #Acute on chronic Juandice  New worsened hyperbilirubinemia. ~2 1mo-2wks ago. RUQ US negative for  acute obstruction with stone, or dilation of biliary tree. Discussion with panc/bili GI, unlikely obstructive cause of jaundice without observed dilation of biliary tree. Still actively using alcohol, approximately 5-6 drinks per week decreased 1 drink since symptoms started 1 weeks ago. Jaundice through abdomen, acute pruritis.  Given alcohol use history, may be from alcoholic hepatitis.     #Polysubstance Use   #AUD  #Nicotine Use   Long standing alcohol use, and nicotine use. Recently cut down from 5-6 drinks per night to 1 during acute illness past 10 days. Has never tried to quit in the past, is now interested. Has cut down from 1ppd to 1/3ppd in regards to nicotine. Open to speaking to chem dep.Last drink of alcohol approximately 24 hours ago.     -Nicotine patch in place.   -[facility] protocol     #Acute Deconditioning  #Malnutrition   Acute deconditioning and malnutrition in setting of catabolic state of cirrhosis. Presently NPO for acute pancreatitis, but plan for high protein diet when he can tolerate.     -IP nutrition consult, appreciate recs  -PT consult, appreciate recs        Diet: Clear Liquid Diet    DVT Prophylaxis: Pneumatic Compression Devices  Yanes Catheter: Not present  Fluids: None   Central Lines: None  Code Status: Full Code      Disposition Plan   Expected discharge: 11/06/2021   recommended to prior living arrangement once imrpovement in liver function.     The patient's care was discussed with the Attending Physician, Dr. Tee.    Benji Agrawal MD  [address]  Securely message with the Vocera Web Console (learn more here)  Text page via [email or web address] Paging/Directory    Please see sign in/sign out for up to date coverage information       ______________________________________________________________________    Interval History   Reports improvement in abdominal distension. Continues to have abdominal pain, well controlled by  dilaudid dose, but does not last long enough. Having bowel movements. Improvement in urine output subjectively.     Data reviewed today: I reviewed all medications, new labs and imaging results over the last 24 hours. I personally reviewed no images or EKG's today.    Physical Exam   Vital Signs: Temp: 97.9  F (36.6  C) Temp src: Oral BP: (!) 143/70 Pulse: 91   Resp: 16 SpO2: 95 % O2 Device: None (Room air)    Weight: 192 lbs 1.6 oz    Physical Exam  Constitutional:       General: He is not in acute distress.  HENT:      Head: Normocephalic and atraumatic.      Mouth/Throat:      Mouth: Mucous membranes are moist.   Eyes:      General: Scleral icterus present.      Comments: miosis   Neck:      Vascular: JVD present.   Cardiovascular:      Rate and Rhythm: Normal rate and regular rhythm.      Pulses:           Radial pulses are 3+ on the right side and 3+ on the left side.      Heart sounds: Murmur heard.   Gallop present. S4 sounds present.       Comments: Hyperdynamic   Pulmonary:      Effort: Pulmonary effort is normal.      Breath sounds: Rales present.   Abdominal:      General: Abdomen is protuberant. There is distension.      Palpations: There is hepatomegaly.      Tenderness: There is no abdominal tenderness. There is no guarding.      Comments: Mild improvement in distension     Musculoskeletal:      Right lower leg: Edema present.      Left lower leg: Edema present.   Skin:     General: Skin is warm and dry.      Capillary Refill: Capillary refill takes less than 2 seconds.      Coloration: Skin is jaundiced.   Neurological:      Mental Status: He is alert.     Data   Recent Labs   Lab 11/03/21  1355 11/03/21  0745 11/02/21  2211 11/02/21  1836 11/02/21  1836 11/02/21  1747 11/02/21  0644   WBC 12.1* 13.8*  --   --  13.4*   < > 13.4*   HGB 6.9* 6.3* 6.3*   < > 7.4*   < > 8.1*   MCV 94 96  --   --  95   < > 97   PLT 79* 88*  --   --  113*   < > 135*   INR  --  2.21*  --   --   --   --  1.92*   NA  --  128*   --   --   --   --  128*   POTASSIUM  --  4.9  --   --   --   --  4.2   CHLORIDE  --  97  --   --   --   --  95   CO2  --  17*  --   --   --   --  23   BUN  --  89*  --   --   --   --  75*   CR  --  3.77*  --   --   --   --  2.82*   ANIONGAP  --  14  --   --   --   --  10   HERNESTO  --  7.5*  --   --   --   --  7.6*   GLC  --  91  --   --   --   --  132*   ALBUMIN  --  2.0*  --   --   --   --  1.5*   PROTTOTAL  --  5.3*  --   --   --   --  6.1*   BILITOTAL  --  24.4*  --   --   --   --  25.3*   ALKPHOS  --  114  --   --   --   --  183*   ALT  --  113*  --   --   --   --  182*   AST  --  94*  --   --   --   --  174*   LIPASE  --   --   --   --   --   --  6,327*    < > = values in this interval not displayed.     Recent Results (from the past 24 hour(s))   XR Chest Port 1 View    Narrative    Exam: XR CHEST PORT 1 VIEW, 11/2/2021 6:10 PM    Comparison: CT chest abdomen and pelvis same date    History: Decompensation cirhossis    Findings:  A single portable semiupright view of the chest is obtained.    Trachea is midline. Mediastinum is within normal limits.  Cardiopulmonary silhouette is within normal limits. Increased  perihilar and generalized interstitial prominence. There is no  pneumothorax or pleural effusion. The upper abdomen is unremarkable.      Impression    Impression: Pulmonary vascular congestion.    I have personally reviewed the examination and initial interpretation  and I agree with the findings.    MARTINA BARRIOS MD         SYSTEM ID:  [id number]

## 2021-11-03 NOTE — PLAN OF CARE
Alert and oriented x 4 but forgetful. Complained of pain in the abdomen and dilaudid 0.3 mg given x 1. Up to the BR with SBA and voiding. Post void done x 1 and he had emptied well. Urine dark cindy. Walked in the halls. Antibiotic given. RBC yet to be given. Type and screen resulted towards this morning. UA sent. Calls appropriately and bed alarm on.

## 2021-11-03 NOTE — CONSULTS
11/3/2021    CD consult completed.   I spoke with pt via bedside phone, but not interested in formal treatment at this time but interested in an email of resources. Pt reports he is pretty sick right now, but will look over resources.   Due to pt's medical issues, he is not a candidate for residential treatment, and likely not formal IOP treatment (15-20 hours per week).   Pt confirmed his email address with me, thanked me for calling.     Rekha Vidal Community Health [email or web address] [email or web address]  Direct phone: [phone]

## 2021-11-03 NOTE — PROGRESS NOTES
C/o abdominal pain related to ascites. 0.3 mg dilaudid given x2 to help tolerate pain. Denies N/V and SOB. Pt hgb remained stable at 6.3 overnight and remained critically low at 6.3 this AM. RBCs transfused. Concerned for active bleed in liver and will likely embolize. Transferring to a higher level of care

## 2021-11-03 NOTE — PROGRESS NOTES
CLINICAL NUTRITION SERVICES - ASSESSMENT NOTE     Nutrition Prescription    RECOMMENDATIONS FOR MDs/PROVIDERS TO ORDER:  - Total fluids/adjustments per Provider  - Recommend starting pt on Folate 1 mg daily and Thiamin 100 mg daily d/t alcohol withdrawal and refeeding syndrome (once diet resumed)   - Once diet adv beyond CLs, recommend ordering calorie counts to monitor po intakes and order a daily MVI with mineral supplement.     Malnutrition Status:    - Unable to determine at this time    Recommendations already ordered by Registered Dietitian (RD):  - None at this time    Future/Additional Recommendations:  - Monitor for diet adv, tolerance/adequacy of intakes, need for ONS, if low po intakes noted vs need for nutritional support if pt remains NPO > 48 to 72 hrs.  - If TF warranted, recommend placing post-pyloric FT and initiate TF with Vital 1.5 Otilio @ 10 ml/hr x 12 hrs with adv by 10 ml every 12 hrs to goal of 60 ml/hr. Regimen provides 1440 ml/da, 2160 kcals (26 kcals/kg), 97 g PRO (1,2 g PRO/kg), 1100 ml free H20, 269 g CHO, and 8 g fiber daily.   - Recommend checking Fecal Elastase to determine need for PERT with po vs TF        REASON FOR ASSESSMENT  Dima Olivas is a/an 61 year old male assessed by the dietitian for Provider Order - acute malnutrition in setting of cirrhosis    Per chart review, pt presents with worsening abd pain, distention, found to have worsening ascites, elevated LFTs and Lipase 6000.  Reported that pt has had a poor appetite and has been constipated since receiving chemo on 10/22, in addition to activity using alcohol (~ 5-6 drinks per week) with decreased to 1 drink since symptoms developed a week ago.     NUTRITION HISTORY  Unable to obtain secondary to pt transferring to a higher level of care. Per H&P, pt with a h/o approximately 10 day of generalized anorexia, with intermittent non-blood emesis and poor po intakes.     CURRENT NUTRITION ORDERS  Diet: NPO since admit on  "11/2    LABS  NA++ 128 (Low)   BUN/Cr 89/3.77 (High)  Ca++ 7.5 (Low)  Bilirubin Total 24.4 (High)   Bilirubin Direct 20.2 (High) 11/2  Lipase 6,3277 (High) on 11/2  (-) Ketones (11/3)    MEDICATIONS  Lactulose TID    ANTHROPOMETRICS  Height: 177.7 cm (5' 9.961\")  Most Recent Weight: 87.1 kg (192 lb 1.6 oz) - lowest wt this admission  IBW: 75 kg  BMI: Overweight BMI 25-29.9  Weight History: Per chart review, suspect current wt is elevated d/t fluids secondary to ascites. Per review of EMR, suspect 81.6 kg (180 lbs) on 9/24, may be more reflective of pt's baseline wt.  Wt Readings from Last 10 Encounters:   11/03/21 87.1 kg (192 lb 1.6 oz)   10/22/21 83 kg (183 lb)   09/24/21 81.6 kg (180 lb)   07/07/21 93.8 kg (206 lb 12.8 oz)   03/04/20 89.5 kg (197 lb 6.4 oz)   03/03/20 81.7 kg (180 lb 3.2 oz)   02/10/20 81.6 kg (180 lb)   12/01/16 79.4 kg (175 lb)       Dosing Weight:  82 kg (based on lowest wt of 81.6 kg on 9/24)    ASSESSED NUTRITION NEEDS  Estimated Energy Needs: 0917-5853 kcals/day (25 - 30 kcals/kg)  Justification: Maintenance  Estimated Protein Needs:  grams protein/day (1.2 - 1.5 grams of pro/kg)  Justification: Hypercatabolism with acute illness  Estimated Fluid Needs: (1 mL/kcal)   Justification: Maintenance    PHYSICAL FINDINGS  See malnutrition section below.  Jaundiced  Abd Distended/pain  Dry Mucous Membranes   Ascites  Scleral icterus    MALNUTRITION  % Intake: Unable to assess, but suspect low d/t h/o poor appetite  % Weight Loss: Unable to assess  Subcutaneous Fat Loss: Unable to assess  Muscle Loss: Unable to assess  Fluid Accumulation/Edema: Unable to assess  Malnutrition Diagnosis: Unable to determine due to unable to complete all parameters of nutritional assessment.     NUTRITION DIAGNOSIS  Inadequate oral intake related to poor appetite with intermittent non-blood emesis, distention and abd pain as evidenced by suspect consuming at least <75% of intakes for > week and NPO since " 11/2    INTERVENTIONS  Implementation  Nutrition Education: Not appropriate at this time due to patient condition     Goals  Diet adv v nutrition support within 2-3 days.     Monitoring/Evaluation  Progress toward goals will be monitored and evaluated per protocol.  Ruchi Elias RD,LD  7D pager [phone]

## 2021-11-03 NOTE — PROGRESS NOTES
Hematology / Oncology  Daily Progress Note   Date of Service: 11/03/2021  Patient: Dima Olivas  MRN: [id number]  Admission Date: 11/2/2021  Hospital Day # 1  Cancer Diagnosis: HCC   Primary Outpatient Oncologist: Dr Germain   Current Treatment Plan: Atezolizumab/Bevacizumab (C1D1 = 10/22/21)    Assessment & Plan:   Dima Olivas is a 61 year old male admitted on 11/2/2021. He has a history of cirrhosis 2/2 alcohol use and untreated Hep C and HCC initiated on Bevacizumab/Tecentriq cycle 1=10/22/2021. Now admitted for acute cirrhosis decompensation in the setting of hepatic hemorrhage likely 2/2 HCC lesion presenting with significant hyperbili, MICHELLE and pancreatitis. CT CAP on admission with large abdominal hematoma and large volume ascites.     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 We discussed with the patient and primary team that bevacizumab increases risk for bleeding and clotting. CT CAP on admission with large abdominal hematoma and large volume ascites. Could be due to response from bevacizumab vs hyper progression. S/p paracentesis with only 500 ml off, awaiting cultures but bloody appearance.     Recommendations:   - Serial Hgb and supportive cares as you are doing with pRBCs for hgb <7. Drain ascites fluid as able. Unfortunately, bevacizumab remains in the system for weeks and we do not have a reversal agent so need supportive cares in interim.   - Next due for Atezo/Liz 11/12, will likely need to delay or change treatment regimen. We will update primary oncologist of hospitalization.     Patient was seen and plan of care was discussed with attending physician Dr. Yo.    Thank you for the opportunity to partake in this patients plan of care. Please do not hesitate to page with questions. We will continue to follow.     Lizet  "JULITO Major (Christofersen)    Hematology/Oncology   Pager: [phone]   ___________________________________________________________________    Subjective & Interval History:    Afebrile and HD stable. Wife at bedside. Ongoing abdominal distention and discomfort after para yesterday. Hgb 6.3 overnight without transfusion and repeat 6.3 again this morning. Getting 1u pRBCs. Endorses bright red blood in still this AM and orange tinge to stools for multiple days. Will save stools for nursing to see. Had BM this morning.     Physical Exam:    Blood pressure 123/64, pulse 84, temperature 98.1  F (36.7  C), temperature source Oral, resp. rate 16, height 1.777 m (5' 9.96\"), weight 87.1 kg (192 lb 1.6 oz), SpO2 98 %.    General: lying in bed, chronically ill appearing, significant jaundice   HEENT: sclera icteric  Neck: supple, normal ROM  CV: RRR, normal S1/S2, no m/r/g  Resp: CTAB, no wheezing/crackles, normal respiratory effort on ambient air  GI: distended, non tender, epigastric palpable mass, bowel sounds present and normoactive  MSK: warm and well-perfused, normal tone, trace LE edema   Skin: no rashes on limited exam   Neuro: Alert and interactive but fatigued, moves all extremities equally, no focal deficits    Labs & Studies: I personally reviewed the following studies:  ROUTINE LABS (Last four results):  CMP  Recent Labs   Lab 11/03/21  0745 11/02/21  0644   * 128*   POTASSIUM 4.9 4.2   CHLORIDE 97 95   CO2 17* 23   ANIONGAP 14 10   GLC 91 132*   BUN 89* 75*   CR 3.77* 2.82*   GFRESTIMATED 16* 23*   HERNESTO 7.5* 7.6*   PROTTOTAL 5.3* 6.1*   ALBUMIN 2.0* 1.5*   BILITOTAL 24.4* 25.3*   ALKPHOS 114 183*   AST 94* 174*   * 182*     CBC  Recent Labs   Lab 11/03/21  1355 11/03/21  0745 11/02/21  2211 11/02/21  1836 11/02/21  1747 11/02/21  1747   WBC 12.1* 13.8*  --  13.4*  --  13.7*   RBC 2.28* 2.09*  --  2.45*  --  2.42*   HGB 6.9* 6.3* 6.3* 7.4*   < > 7.5*   HCT 21.5* 20.0*  --  23.3*  --  23.3*   MCV 94 " 96  --  95  --  96   MCH 30.3 30.1  --  30.2  --  31.0   MCHC 32.1 31.5  --  31.8  --  32.2   RDW 17.1* 17.6*  --  17.6*  --  17.4*   PLT 79* 88*  --  113*  --  98*    < > = values in this interval not displayed.     INR  Recent Labs   Lab 11/03/21  0745 11/02/21  0644   INR 2.21* 1.92*       Medications list for reference:  Current Facility-Administered Medications   Medication     0.9% sodium chloride BOLUS     HYDROmorphone (PF) (DILAUDID) injection 0.3 mg     lactulose (CHRONULAC) solution 10 g     lidocaine (LMX4) cream     lidocaine 1 % 0.1-1 mL     melatonin tablet 3 mg     naloxone (NARCAN) injection 0.2 mg    Or     naloxone (NARCAN) injection 0.4 mg    Or     naloxone (NARCAN) injection 0.2 mg    Or     naloxone (NARCAN) injection 0.4 mg     nicotine (NICODERM CQ) 14 MG/24HR 24 hr patch 1 patch     nicotine Patch in Place     ondansetron (ZOFRAN) injection 4 mg     pantoprazole (PROTONIX) EC tablet 40 mg     phytonadione 5 mg in sodium chloride 0.9 % 50 mL intermittent infusion     piperacillin-tazobactam (ZOSYN) 2.25 g vial to attach to  ml bag     sodium chloride (PF) 0.9% PF flush 3 mL     sodium chloride (PF) 0.9% PF flush 3 mL

## 2021-11-03 NOTE — PROGRESS NOTES
"[facility]    Hepatology Follow-up    CC: Jaundice    Dx: Intra-abdominal hematoma   Infiltrative HCC   Decompensated cirrhosis (ascites) 2/2 ETOH/HCV   Untreated HCV   Alcohol use disorder with active use     24 hour events: his hemoglobin continued to downtrend and paracentesis showed bloody fluid, CT showed intra-abdominal hematoma near the HCC With no active extravasation     Subjective: Feels less distended than prior    Patient denies fevers, sweats or chills.    Medications  Current Facility-Administered Medications   Medication Dose Route Frequency     lactulose  10 g Oral TID     nicotine  1 patch Transdermal Daily     nicotine   Transdermal Q8H     pantoprazole  40 mg Oral Daily     phytonadione  5 mg Intravenous Daily     piperacillin-tazobactam  2.25 g Intravenous Q6H     sodium chloride (PF)  3 mL Intracatheter Q8H       Review of systems  A 10-point review of systems was negative.    Examination  /54 (BP Location: Left arm)   Pulse 86   Temp 97.7  F (36.5  C) (Oral)   Resp 16   Ht 1.777 m (5' 9.96\")   Wt 87.1 kg (192 lb 1.6 oz)   SpO2 97%   BMI 27.59 kg/m      Intake/Output Summary (Last 24 hours) at 11/3/2021 1210  Last data filed at 11/3/2021 1156  Gross per 24 hour   Intake 1015 ml   Output 775 ml   Net 240 ml       Gen- Jaundiced  CVS- 2+ edema  RS- comfortable on room air  Abd- Distended, mildly tender  Extr- 3+ edema to the knee  Neuro- no asterixis  Skin- no rash  Psych- normal mood    Laboratory  Lab Results   Component Value Date     11/03/2021     07/07/2021    POTASSIUM 4.9 11/03/2021    POTASSIUM 3.7 07/07/2021    CHLORIDE 97 11/03/2021    CHLORIDE 110 07/07/2021    CO2 17 11/03/2021    CO2 28 07/07/2021    BUN 89 11/03/2021    BUN 8 07/07/2021    CR 3.77 11/03/2021    CR 0.89 07/07/2021       Lab Results   Component Value Date    BILITOTAL 24.4 11/03/2021    BILITOTAL 3.3 07/07/2021     11/03/2021    ALT 59 07/07/2021    AST 94 " 11/03/2021     07/07/2021    ALKPHOS 114 11/03/2021    ALKPHOS 149 07/07/2021       Lab Results   Component Value Date    WBC 13.8 11/03/2021    WBC 4.4 07/07/2021    HGB 6.3 11/03/2021    HGB 10.6 07/07/2021    MCV 96 11/03/2021     07/07/2021    PLT 88 11/03/2021    PLT 57 07/07/2021       Lab Results   Component Value Date    INR 2.21 11/03/2021    INR 1.55 07/07/2021     MELD-Na score: 40 at 11/3/2021  7:45 AM  MELD score: 40 at 11/3/2021  7:45 AM  Calculated from:  Serum Creatinine: 3.77 mg/dL at 11/3/2021  7:45 AM  Serum Sodium: 128 mmol/L at 11/3/2021  7:45 AM  Total Bilirubin: 24.4 mg/dL at 11/3/2021  7:45 AM  INR(ratio): 2.21 at 11/3/2021  7:45 AM  Age: 61 years     Radiology  Chest x-ray 11/2/2021  Impression: Pulmonary vascular congestion.    Assessment  Mr. Olivas is a 61 year old man with decompensated cirrhosis due to HCV/ETOH (ascites) with ongoing alcohol use and infiltrative HCC on bevacizumab/atezolizumab and hepatology is consulted in the context of jaundice.  Found to have intra-abdominal hematoma due to HCC.    Overnight, he had bloody return from the paracentesis (no red cells reported).  5579 WBC with 14% PMN (called heme lab and had 1.49 million RBC - thus all WBC are accounted for based upon 250 RBC / PMN, so likely not SBP).  Then, ongoing progressive anemia.  CT showed an intra-abdominal hematoma without active extravasation.    IR evaluated and given critical illness, kidney injury, recommends conservative care, which certainly seems reasonable given his overall clinical state.  We are very worried about his course with his MELD of 40, known infiltrative HCC and now this decompensation including bleeding.    #1 Decompensated cirrhosis due to HCV/ETOH decompensated by ascites (MELD 40 and MELD-Na 40)  #2 Intra-abdominal hematoma near HCC without active extravasation  #3 Acute post hemorrhagic anemia  #4 Query alcohol hepatitis vs drug induced pancreatitis atezolizumab vs  progression of liver disease vs less likely SBP  #5 Acute kidney injury  #6 Alcohol use disorder with active use  #7 Acute on chronic anemia  #8 Untreated HCV  #9 Malignant non-occlusive portal vein thrombus (main portal vein and left portal vein)    Recommendations  -- Agree with transfusion to hemoglobin goal of 7 and at least BID hemoglobin  -- Lab reports 1.49 million RBC and thus all of the PMN would be accounted for by bloody tap  -- Please give albumin resuscitation 1g/kg on 11/4  -- Continue phytonadione 5 mg one more dose for vitamin K trial (last dose 11/4)  -- Consider palliative care discussion of goals given MELD, cirrhosis, infiltrative HCC and bleeding    Salvador Oleary MD  Gastroenterology Fellow, PGY-5    Case staffed with attending, Dr. Radford.    Hepatology will continue to follow.    The patient was seen and examined.  The above assessment and plan was developed jointly with the fellow.      Ray Radford MD

## 2021-11-03 NOTE — PLAN OF CARE
"Transfer  Transferred from: 7d  Via:bed  Reason for transfer: Pt appropriate for 6B-worsened patient condition  Family: Aware of transfer  Belongings: Received with pt  Chart: Received with pt  Medications: Meds received from old unit with pt  Code Status verified on armband: yes  2 RN Skin Assessment Completed By: Siomara PRO  Med rec completed: yes  Bed surface reassessed with algorithm and charted: yes  New bed surface ordered: no  Suction/Ambu bag/Flowmeter at bedside: yes    Report received from: Harika HERRERA  Pt status: Alert and oriented. Albumin infusing. Hgb recheck scheduled for 1300.   /64 (BP Location: Left arm)   Pulse 84   Temp 98.1  F (36.7  C) (Oral)   Resp 16   Ht 1.777 m (5' 9.96\")   Wt 87.1 kg (192 lb 1.6 oz)   SpO2 98%   BMI 27.59 kg/m            "

## 2021-11-03 NOTE — PLAN OF CARE
PT-7D- PT Consult order received, per RN pt with low hemoglobin, and may transfer to a higher level of care. PT will hold evaluation at this time, pending further medical work up.

## 2021-11-03 NOTE — PLAN OF CARE
"Neuro: A&Ox4. CIWA-2 for tremors.   Cardiac: SR with frequent PVC's-team notified. -140's.  Respiratory: Sating >94% on RA.  GI/: Adequate urine output. No BM.   Diet/appetite: Clear liquid diet, tolerating.   Activity:  Standby assist.   Pain: PRN dilaudid q2hrs for abd pain.    Skin: Jaundice.   LDA's: R and L PIV.     Plan: 1 unit RBC transfusing. Continue with POC. Notify primary team with changes.  /67   Pulse 85   Temp 97.7  F (36.5  C) (Oral)   Resp 16   Ht 1.777 m (5' 9.96\")   Wt 87.1 kg (192 lb 1.6 oz)   SpO2 95%   BMI 27.59 kg/m        "

## 2021-11-03 NOTE — PLAN OF CARE
[id number]:  Pt arrived on unit from ED at 1545 accompanied by his spouse. Admitted for acute cirrhosis decomp in the setting of hepatic hemorrhage likely 2/2 HCC lesion. AVSS on RA, afebrile. Pt mildly tachy upon arrival, triggered sepsis, lactic 2.1, code sepsis called. Pt given 100g (400mL) of albumin after bedside paracentesis (500mL of ascites fluid removed). Blood cultures drawn. Lactic redraw 2.6, provider notified, no further interventions ordered. Pt c/o 7/10 abdominal pain, 0.5mg Dilaudid given x1 with good effect. Denies N/V, SOB. IV Zosyn initiated while cultures pend. Pt is strict NPO except meds d/t pancreatitis. PO Lactulose given as scheduled. No BM this shift. Pt need UA/UC to be collected. Hgb critical of 6.3 @ 2211, provider promptly placed order for type & screen, and RBC transfusing. Pt already has consent for blood products.

## 2021-11-03 NOTE — CONSULTS
INTERVENTIONAL RADIOLOGY CONSULTATION    Date of Admission: 11/2/2021  Reason for Admission:   Other ascites [R18.8]  Acute kidney injury (H) [N17.9]  Acute liver failure without hepatic coma [K72.00]  Acute biliary pancreatitis, unspecified complication status [K85.10]  Date of Consult: 11/03/21  Requesting Physician: Benji Agrawal MD    ASSESSMENT AND RECOMMENDATIONS:    Assessment:  Dima Olivas is a 61 year old male who was admitted on 11/2/2021 with acute cirrhosis decompensation in setting of active alcohol use and hepatic hemorrhage from HCC lesion with significant hyperbilinemia, MICHELLE, ABLA, ascites, and pancreatitis now with CT chest abdomen/pelvis with contrast 3.8 x 6.8 x 7.3 cm hematoma in the anterior abdomen extending inferiorly from large mass in segment 2/3 of the liver without active extravasation.  11/2/2021 POC paracentesis with 500ml of yessy bloody output.      Patient is not on any blood thinners.      Palliative care team is seeing.      IR was asked to see the patient for consideration of hepatic tumor embolization.    Patient seen with Paz Simon NP and Dr. Mello Zimmer at bedside.     Recommendations: Dr. Mello Zimmer spoke with Dr. Parish who is the patient's primary hepatologist that IR will follow and only intervene if there is evidence of continued bleeding.     It is too risky at this point to perform embolization for such a large infiltrating segment 2 and 3 hepatic mass with multiple other hepatic lesions in setting of decompensated liver failure.  MELD score 40.      Hgb has been stable for the past 12 hours without intervention. Patient is currently getting a unit of blood at beside visit.      Recommend continued observation until at least the weekend.      Primary Team has also been updated.  Patient and wife at bedside is in agreement with management and plan.      Please contact IR with any questions or concerns.    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      EGD 9/24/2021 w/o evidence of varices, but notable for portable hypertensive gastropathy. Paracentesis significant for yessy bloody output about 500 ml.      PAST MEDICAL HISTORY:   Reviewed and edited as appropriate.  Past Medical History:   Diagnosis Date     Cirrhosis (H) 01/2020     Hepatitis C 2009     Hx of intravenous drug use in remission     quit 30 yrs ago     Smoker      PAST SURGICAL HISTORY:    Reviewed and edited as appropriate.  Past Surgical History:   Procedure Laterality Date     ESOPHAGOSCOPY, GASTROSCOPY, DUODENOSCOPY (EGD), COMBINED N/A 9/24/2021    Procedure: ESOPHAGOGASTRODUODENOSCOPY (EGD);  Surgeon: Salvador Covington MD;  [place] [facility] [place]     IR LIVER BIOPSY PERCUTANEOUS  3/11/2020     NO HISTORY OF SURGERY       FAMILY HISTORY:   Reviewed and edited as appropriate.  Family History   Problem Relation Age of Onset     Unknown/Adopted Mother      Unknown/Adopted Father      SOCIAL HISTORY:   Reviewed and edited as appropriate.  Social History     Tobacco Use     Smoking status: Current Every Day Smoker     Packs/day: 0.30     Types: Cigarettes     Smokeless tobacco: Never Used   Substance Use Topics     Alcohol use: Yes     Alcohol/week: 7.0 standard  drinks     Types: 7 Cans of beer per week     Comment: 1-2 drinks a day      ALLERGIES:  Reviewed and edited as appropriate.  Patient has no known allergies.    MEDICATIONS:  Reviewed and edited as appropriate.  Prescription Medications as of 11/3/2021       Rx Number Disp Refills Start End Last Dispensed Date Next Fill Date Owning Pharmacy    furosemide (LASIX) 20 MG tablet  80 tablet 2 10/21/2021    [address]    Sig: TAKE ONE TABLET BY MOUTH EVERYDAY.    Class: E-Prescribe    LORazepam (ATIVAN) 0.5 MG tablet  30 tablet 2 10/22/2021    [facility] [address]    Sig: Take 1 tablet (0.5 mg) by mouth every 4 hours as needed (Anxiety, Nausea/Vomiting or Sleep)    Class: Local Print    Route: Oral    Non-formulary Exception Code: Specific indication for non-formulary alternative    omeprazole (PRILOSEC) 40 MG DR capsule  90 capsule 0 9/24/2021 12/23/2021   [facility] [address]    Sig: Take 1 capsule (40 mg) by mouth daily    Class: E-Prescribe    Route: Oral    prochlorperazine (COMPAZINE) 10 MG tablet  30 tablet 2 10/22/2021    [address]    Sig: Take 1 tablet (10 mg) by mouth every 6 hours as needed (Nausea/Vomiting)    Class: E-Prescribe    Route: Oral    Non-formulary Exception Code: Specific indication for non-formulary alternative    spironolactone (ALDACTONE) 50 MG tablet  90 tablet 2 10/21/2021    [address]    Sig: TAKE ONE TABLET BY MOUTH EVERY DAY    Class: E-Prescribe    zinc sulfate (ZINCATE) 220 (50 Zn) MG capsule  60 capsule 2 8/6/2021    [facility] [address]    Sig: Take 1 capsule (220 mg) by mouth 2 times daily    Class: E-Prescribe    Notes to Pharmacy: For cramping    Route: Oral      Hospital Medications as of  11/3/2021       Dose Frequency Start End    0.9% sodium chloride BOLUS 1-250 mL EVERY 1 HOUR PRN 11/2/2021     Admin Instructions: IF blood component ordered, nurse to administer rate and volume of fluid pre-blood component administration to PRIME tubing AND to FLUSH blood through tubing post blood component administration UNTIL tubing is clear of visible blood.  Use MINIMUM volume necessary for pre and post blood component administration.  IF duplicate order nurse to discontinue the duplicate order.    Class: E-Prescribe    Route: Intravenous    albumin human 25 % injection 100 g 100 g ONCE 11/3/2021     Admin Instructions: Infusion rates should be adjusted based on patient condition and response.<BR>- For shock/hypovolemia, infuse as rapidly as tolerated. For patients with cardiac or circulatory disease at risk for rapid blood pressure increases, do not exceed 5-10 mL/min. <BR>- For patients with normal plasma volume, do not exceed 1-2 mL/min to avoid circulatory overload and pulmonary edema. <BR>- For procedure specific rates, please refer to procedure protocol.    Class: E-Prescribe    Route: Intravenous    albumin human 25 % injection 100 g (Completed) 100 g ONCE 11/2/2021 11/2/2021    Admin Instructions: Infusion rates should be adjusted based on patient condition and response.<BR>- For shock/hypovolemia, infuse as rapidly as tolerated. For patients with cardiac or circulatory disease at risk for rapid blood pressure increases, do not exceed 5-10 mL/min. <BR>- For patients with normal plasma volume, do not exceed 1-2 mL/min to avoid circulatory overload and pulmonary edema. <BR>- For procedure specific rates, please refer to procedure protocol.    Class: E-Prescribe    Route: Intravenous    HYDROmorphone (PF) (DILAUDID) injection 0.3 mg 0.3 mg EVERY 2 HOURS PRN 11/3/2021     Class: E-Prescribe    Route: Intravenous    HYDROmorphone (PF) (DILAUDID) injection 0.5 mg (Completed) 0.5 mg EVERY 15 MIN PRN 11/2/2021  "11/2/2021    Admin Instructions: Notify the provider to assess for uncontrolled pain or analgesic side effects.  Hold while on IV PCA or with regular IV opioid dosing.    Class: E-Prescribe    Route: Intravenous    iopamidol (ISOVUE-370) solution 120 mL (Completed) 120 mL ONCE 11/2/2021 11/2/2021    Class: E-Prescribe    Route: Intravenous    lactulose (CHRONULAC) solution 10 g 10 g 3 TIMES DAILY 11/2/2021     Class: E-Prescribe    Route: Oral    lidocaine (LMX4) cream  EVERY 1 HOUR PRN 11/2/2021     Admin Instructions: Apply at least 30 minutes prior to VAD insertion in divided doses as needed for size of site for insertion.<BR>MAX Dose: 2.5 g (  of 5 g tube)<BR>Do NOT give if patient has a history of allergy to any local anesthetic or any \"kaela\" product.<BR>Do NOT use both lidocaine intradermal/subcutaneous injection and the lidocaine cream on the same site.    Class: E-Prescribe    Route: Topical    lidocaine 1 % 0.1-1 mL 0.1-1 mL EVERY 1 HOUR PRN 11/2/2021     Admin Instructions: MAX dose 1 mL subcutaneous OR intradermal along the side of the vein in divided doses as needed for VAD insertion.<BR>Do NOT give if patient has a history of allergy to any local anesthetic or any \"kaela\" product.<BR>Do NOT use both lidocaine intradermal/subcutaneous injection and the lidocaine cream on the same site.    Class: E-Prescribe    Route: Other    melatonin tablet 3 mg 3 mg AT BEDTIME PRN 11/2/2021     Admin Instructions: Do not give unless at least 6 hours of uninterrupted sleep is expected.    Class: E-Prescribe    Route: Oral    naloxone (NARCAN) injection 0.2 mg 0.2 mg EVERY 2 MIN PRN 11/2/2021     Admin Instructions: Administer intravenous route when available and notify provider when administered.<BR>For unintended sedation or respiratory depression if all of the below criteria are met:<BR>~ respiratory rate LESS than or EQUAL to 8. <BR>~SaO2 less than 92% and or/end-tidal CO2 is greater than 50.<BR>~ the patient is " "receiving an opioid, has unintended sedations assessed as RASS (-3), and is currently not on mechanical ventilation.  RASS scale moderate (-3) is movement or eye opening to voice but no eye contact.<BR><BR>Patient Monitoring<BR>Once the patient has demonstrated a response to the naloxone, continue to monitor respiratory rate, depth, oxygen saturation and end-tidal CO2 (if available) every 15 minutes x 2, then every 30 minutes x 2, then every 1 hour x 1 after each naloxone dose.  Consider transfer to ICU if patient respiratory parameters have not improved after 4 naloxone doses.    Class: E-Prescribe    Route: Intravenous    Linked Group 1: \"Or\" Linked Group Details        naloxone (NARCAN) injection 0.2 mg 0.2 mg EVERY 2 MIN PRN 11/2/2021     Admin Instructions: Administer intramuscular if an intravenous route is not available and notify provider when administered.<BR>For unintended sedation or respiratory depression if all of the below criteria are met:<BR>~ respiratory rate LESS than or EQUAL to 8. <BR>~SaO2 less than 92% and or/end-tidal CO2 is greater than 50.<BR>~ the patient is receiving an opioid, has unintended sedations assessed as RASS (-3), and is currently not on mechanical ventilation.  RASS scale moderate (-3) is movement or eye opening to voice but no eye contact.<BR><BR>Patient Monitoring<BR>Once the patient has demonstrated a response to the naloxone, continue to monitor respiratory rate, depth, oxygen saturation and end-tidal CO2 (if available) every 15 minutes x 2, then every 30 minutes x 2, then every 1 hour x 1 after each naloxone dose.  Consider transfer to ICU if patient respiratory parameters have not improved after 4 naloxone doses.    Class: E-Prescribe    Route: Intramuscular    Linked Group 1: \"Or\" Linked Group Details        naloxone (NARCAN) injection 0.4 mg 0.4 mg EVERY 2 MIN PRN 11/2/2021     Admin Instructions: Administer intravenous route when available and notify provider when " "administered.<BR>For unintended sedation or respiratory depression if all of the below criteria are met:<BR>~ respiratory rate LESS than or EQUAL to 8.<BR>~ SaO2 less than 92% and or/end-tidal CO2 is greater than 50.<BR>~ the patient is receiving an opioid, has unintended sedation assessed as RASS (-4) or (-5) and patient is currently not on mechanical ventilation.  RASS scale (-4) is deep sedation with no response to voice but movement or eye opening to physical stimulation.  RASS scale (-5) is unarousable.  <BR><BR>Patient Monitoring<BR>Once the patient has demonstrated a response to the naloxone, continue to monitor respiratory rate, depth, oxygen saturation and end-tidal CO2 (if available) every 15 minutes x 2, then every 30 minutes x 2, then every 1 hour x 1 after each naloxone dose.  Consider transfer to ICU if patient respiratory parameters have not improved after 4 naloxone doses.    Class: E-Prescribe    Route: Intravenous    Linked Group 1: \"Or\" Linked Group Details        naloxone (NARCAN) injection 0.4 mg 0.4 mg EVERY 2 MIN PRN 11/2/2021     Admin Instructions: Administer intramuscular if an intravenous route is not available and notify provider when administered.<BR>For unintended sedation or respiratory depression if all of the below criteria are met:<BR>~ respiratory rate LESS than or EQUAL to 8.<BR>~ SaO2 less than 92% and or/end-tidal CO2 is greater than 50.<BR>~ the patient is receiving an opioid, has unintended sedation assessed as RASS (-4) or (-5) and patient is currently not on mechanical ventilation.  RASS scale (-4) is deep sedation with no response to voice but movement or eye opening to physical stimulation.  RASS scale (-5) is unarousable.  <BR><BR>Patient Monitoring<BR>Once the patient has demonstrated a response to the naloxone, continue to monitor respiratory rate, depth, oxygen saturation and end-tidal CO2 (if available) every 15 minutes x 2, then every 30 minutes x 2, then every 1 " "hour x 1 after each naloxone dose.  Consider transfer to ICU if patient respiratory parameters have not improved after 4 naloxone doses.    Class: E-Prescribe    Route: Intramuscular    Linked Group 1: \"Or\" Linked Group Details        nicotine (NICODERM CQ) 14 MG/24HR 24 hr patch 1 patch 1 patch DAILY 11/2/2021     Admin Instructions: Reminder: Remove previous patch before applying new patch.    Class: E-Prescribe    Route: Transdermal    nicotine Patch in Place  EVERY 8 HOURS 11/2/2021     Admin Instructions: Chart every shift, confirming that patch is still in place on patient (no barcode scan needed). See patch order for dose information.    Class: E-Prescribe    Route: Transdermal    ondansetron (ZOFRAN) injection 4 mg 4 mg EVERY 30 MIN PRN 11/2/2021     Admin Instructions: May repeat in 30 minutes as needed, up to 3 doses.<BR>Irritant.    Class: E-Prescribe    Route: Intravenous    pantoprazole (PROTONIX) EC tablet 40 mg 40 mg DAILY 11/2/2021     Admin Instructions: Therapeutic interchange for omeprazole.    Class: E-Prescribe    Route: Oral    phytonadione 5 mg in sodium chloride 0.9 % 50 mL intermittent infusion 5 mg DAILY 11/2/2021 11/5/2021    Admin Instructions: Protect from light.    Class: E-Prescribe    Route: Intravenous    piperacillin-tazobactam (ZOSYN) 2.25 g vial to attach to  ml bag 2.25 g EVERY 6 HOURS 11/3/2021     Admin Instructions: Dose adjusted per renal dosing policy.  Estimated CrCl = 20-40 mL/min.<BR>Lactated Ringer's solution is not compatible with piperacillin-tazobactam for injection.     Class: E-Prescribe    Notes to Pharmacy: For [facility], [facility] and [facility]: For Zosyn-naive patients, use the \"Zosyn initial dose + extended infusion\" order panel.    Route: Intravenous    piperacillin-tazobactam (ZOSYN) 3.375 g vial to attach to  mL bag (Completed) 3.375 g ONCE 11/2/2021 11/2/2021    Admin Instructions: Lactated Ringer's solution is not compatible with piperacillin-tazobactam for " "injection.     Class: E-Prescribe    Notes to Pharmacy: For [facility], [facility] and [facility]: For Zosyn-naive patients, use the \"Zosyn initial dose + extended infusion\" order panel.    Route: Intravenous    sodium chloride (PF) 0.9% PF flush 3 mL 3 mL EVERY 8 HOURS 11/2/2021     Admin Instructions: to lock peripheral IV dormant line    Class: E-Prescribe    Route: Intracatheter    sodium chloride (PF) 0.9% PF flush 3 mL 3 mL EVERY 1 MIN PRN 11/2/2021     Class: E-Prescribe    Route: Intracatheter    sodium chloride (PF) 0.9% PF flush 90 mL (Completed) 90 mL ONCE 11/2/2021 11/2/2021    Class: E-Prescribe    Route: Intravenous        PHYSICAL EXAM:   /55 (BP Location: Left arm)   Pulse 89   Temp 98.1  F (36.7  C) (Oral)   Resp 18   Ht 1.777 m (5' 9.96\")   Wt 87.1 kg (192 lb 1.6 oz)   SpO2 96%   BMI 27.59 kg/m    Constitutional: alert and oriented times 3.  Juandiced.    Respiratory: non labored breathing  Gastrointestinal: positive findings: severe abdominal distended, ascites  : Deferred  Musculoskeletal: extremities normal- no gross deformities noted  Neurologic: negative findings: speech normal, mental status intact  Psychiatric: mentation appears normal     LABS:  BMP RESULTS:  Lab Results   Component Value Date     (L) 11/03/2021     07/07/2021    POTASSIUM 4.9 11/03/2021    POTASSIUM 3.7 07/07/2021    CHLORIDE 97 11/03/2021    CHLORIDE 110 (H) 07/07/2021    CO2 17 (L) 11/03/2021    CO2 28 07/07/2021    ANIONGAP 14 11/03/2021    ANIONGAP 6 07/07/2021    GLC 91 11/03/2021     (H) 07/07/2021    BUN 89 (H) 11/03/2021    BUN 8 07/07/2021    CR 3.77 (H) 11/03/2021    CR 0.89 07/07/2021    GFRESTIMATED 16 (L) 11/03/2021    GFRESTIMATED >90 07/07/2021    GFRESTBLACK >90 07/07/2021    HERNESTO 7.5 (L) 11/03/2021    HERNESTO 8.3 (L) 07/07/2021        CBC RESULTS:  Lab Results   Component Value Date    WBC 13.8 (H) 11/03/2021    WBC 4.4 07/07/2021    RBC 2.09 (L) 11/03/2021    RBC 3.12 (L) 07/07/2021    HGB 6.3 " (LL) 11/03/2021    HGB 10.6 (L) 07/07/2021    HCT 20.0 (L) 11/03/2021    HCT 31.7 (L) 07/07/2021    MCV 96 11/03/2021     (H) 07/07/2021    MCH 30.1 11/03/2021    MCH 34.0 (H) 07/07/2021    MCHC 31.5 11/03/2021    MCHC 33.4 07/07/2021    RDW 17.6 (H) 11/03/2021    RDW 15.7 (H) 07/07/2021    PLT 88 (L) 11/03/2021    PLT 57 (L) 07/07/2021     INR/PTT:  Lab Results   Component Value Date    INR 2.21 (H) 11/03/2021    INR 1.55 (H) 07/07/2021     Liver Function Studies - Recent Labs   Lab Test 11/03/21  0745   PROTTOTAL 5.3*   ALBUMIN 2.0*   BILITOTAL 24.4*   ALKPHOS 114   AST 94*   *         MELD SCORE  MELD-Na score: 40 at 11/3/2021  7:45 AM  MELD score: 40 at 11/3/2021  7:45 AM  Calculated from:  Serum Creatinine: 3.77 mg/dL at 11/3/2021  7:45 AM  Serum Sodium: 128 mmol/L at 11/3/2021  7:45 AM  Total Bilirubin: 24.4 mg/dL at 11/3/2021  7:45 AM  INR(ratio): 2.21 at 11/3/2021  7:45 AM  Age: 61 years    DIAGNOSTIC STUDIES:   11/2/2021:  CT chest abdomen/pelvis:  IMPRESSION:   1. Hemorrhage - 3.8 x 6.8 x 7.3 cm hematoma in the anterior abdomen  extending inferiorly from large mass in segment 2/3 of the liver.  No  active extravasation is demonstrated on CT scan.   2. Moderate to large volume ascites.  3. Cirrhotic configuration to the liver without significant change in  multiple nonenhancing and enhancing lesions scattered throughout the  hepatic parenchyma, which are better characterized on the comparison  abdominal MRI including a previous LI-RADS M lesion in hepatic segment  VIII.  4. Portal venous thrombus involving the main and left portal veins.  5. Portal hypertension with moderate to large volume ascites,  splenomegaly, recanalized umbilical vein, and portosystemic shunts.  6. Bowel wall thickening involving multiple loops of small bowel in  the left hemiabdomen likely reactive due to the associated ascites.  7. Cholelithiasis without evidence for acute cholecystitis.     I have personally reviewed  the examination and initial interpretation  and I agree with the findings.     MARTINA BARRIOS MD

## 2021-11-04 NOTE — PROGRESS NOTES
IR follow-up note.    This is a 61 year old male who was admitted on 11/2/2021 with acute cirrhosis decompensation in setting of active alcohol use and hepatic hemorrhage from HCC lesion with significant hyperbilinemia, MICHELLE, ABLA, ascites, and pancreatitis and CT scan 11/2 and 11/3 with evidence of hematoma inferior to the left hepatic mass and hemoperitoneum. IR was originally consulted 11/2 for possible embolization. Hgb stable at that time and pt HDS. Team continued to trend hgb and today pt had a drop from 7.1-->5.4 between 0432 and 1206. Pt is HDS. Team is giving 2 units of PRBCs.    Case discussed with Dr. Cardona from IR and Dr. Agrawal. Pt with decompensated liver failure and MELD 39 today. Risk of embolization is worsening liver failure in patient with no reserve and ultimately hastening decompensation. Team should continue GOC discussion (palliative is involved). If pt/family desire procedural intervention for bleeding, IR recommends CTA of the abdomen to evaluate for active arterial bleeding that can be targeted with catheter angiogram. If this scan is negative IR would not offer intervention. If there is targetable bleeding on CTA, IR would proceed with selective catheter embolization. The pt does have significant MICHELLE and there should be a discussion about use of contrast and worsening renal function with need for RRT.     IR will continue to follow.    Paz Simon DNP, MARIBEL  Interventional Radiology   IR on-call pager: [phone]

## 2021-11-04 NOTE — PROVIDER NOTIFICATION
11/03/21 2000   Call Information   Date of Call 11/03/21   Time of Call 2011   Name of person requesting the team Shauna LATIF   Title of person requesting team RN   RRT Arrival time 2014   Time RRT ended 2032   Reason for call   Type of RRT Adult   Primary reason for call Sepsis suspected   Sepsis Suspected Elevated Lactate level   Was patient transferred from the ED, ICU, or PACU within last 24 hours prior to RRT call? No   SBAR   Situation LA 3   Background Hep C/ alcohol use/ cirrhosis/ multifocal HCC with known portal vein thrombosis, EGD clean recently, never ascites, never HE, stated atezolizumab/ bevacizumab first dose approx 10 days ago, coming in with abdominal pain and distension   Notable History/Conditions Cancer;End-Stage disease   Assessment A+Ox4, VSS, abd distended with increased discomfort   Interventions Meds   Adjustments to Recommend repeat abd CT ordered   Patient Outcome   Patient Outcome Stabilized on unit   RRT Team   Attending/Primary/Covering Physician Heme Onc   Date Attending Physician notified 11/03/21   Time Attending Physician notified 2011   Physician(s) Linsey Hernandez NP   Lead RN Juan LATIF   RT Bert   Post RRT Intervention Assessment   Post RRT Assessment Stable/Improved   Date Follow Up Done 11/03/21   Time Follow Up Done 2235   Comments abd CT showed hemoperitoneum

## 2021-11-04 NOTE — CONSULTS
Nicotine Cessation Consult   2021    Patient: Dima Olivas      :  1960                    MRN:[id number]      History of Present Illness: Dima Olivas is a 61 year old male admitted on 2021. 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     Reason for Consult: Current every day smoker    Patient open to sharing about his tobacco use and in learning about more options and resources for quitting, staying quit, and in developing a relapse prevention plan.     Patient interested in counseling and NRT during hospitalization.     Types of Tobacco and Amount   Cigarettes 5-6/day   E-Cigs    Smokeless Tobacco    Cigars    Pipes    Waterpipes      Patients last cigarette/vape/e-cig/smokeless tobacco:    21     F[place]strom Test for Nicotine Dependence   How soon after waking do you smoke your first cigarette Within 5 minutes=3  5-30 minutes=2  31-60 minute=1  >61 minutes=0      1        Do you find it difficult to refrain from smoking in places where it is forbidden? e.g. Gnosticism, restaurants, etc? Yes=1  No=0        0   Which cigarette would you hate to give up? The first in the morning=1  Any other=0 1        How many cigarettes a day do you smoke? 10 or less=0  11-20=1  21-30=2  31 or more=3 0   Do you smoke more frequently in the morning?   Yes=1  No=0 0   Do you smoke even if you are sick in bed most of the day? Yes=1  No=0 0                                                                                                                                     Total Score 2   SCORE 1-2 = Low Dependence   3-4 = Low to Mod Dependence    5-7 = Moderate Dependence      8+ = High Dependence     [place] Tobacco Withdrawal Scale  "  DSM-5 Symptoms 0 = none, 1 = slight, 2 = mild, 3 = moderate, 4 = severe   Desire or craving to smoke 0   Anxious, nervous 0   Depressed mood, sad 0   Difficulty concentrating 0   Increased appetite, hungry, weight gain 0   Insomnia, sleep problems, awakening at night 0   Restless 0   Impatient  0   Total Score 0   Score:  1-8=Slight          8-16=Mild           16-24=moderate     24+=Severe     Stage of Behavior Change:   Pre-contemplation - No intention    Contemplation - Change on the horizon    Preparation - Getting Ready X   Action - Consistently changed (within 6 months)    Maintenance - Staying quit (more than 6 months)    Relapse - Recycling      Patients Motivation to Quit Scale    Importance (1-10): 9   Readiness (1-10) 9   Confidence  (1-10): 9   Can Patient Imagine a Future without Smoking: Not sure   Quit Attempt Date:  11/2/2   Final Quit Date:  TBD     Patients main reason(s) for smoking include: Patient stated he usually smokes out of habit and routine.     Top Reason(s) to quit smoking:  \"I just want to be done with this\" and \"I want to make healthier choices\"     Quit Attempts: Several but patient admits to never making a plan on how to quit but rather cuts back but then finds himself     Triggers: Waking up in the morning, needs to get going. After a good meal    Assessment Using Motivational Interviewing: This writer provided . Patient admits that his identity is that of a lifelong smoker and it will be challenging to think of himself as a non-smoker.      MI Intervention: Expressed Empathy/Understanding, Supported Autonomy, Collaboration, Evocation, Permission to raise concern or advise, Open-ended questions, Reflections: simple and complex, Change talk (evoked) and Reframe    Nicotine Dependence Score: 2    Withdrawal Score: 0    Assessment/Education/Recommendations    Education:    -Provided education on the safety of NRT, effects of stabilizing the brain to allow for behavior modification " "and increasing chances of quitting.   -Encouraged patient to consider risks of continued smoking as it relates to chemotherapy   -Provided educational workbook, \"Quitting for Good with Treatment and Support.\"    -Provided motivation, encouragement, reflective listening, and emotional support.   -Shared that it's never too late to quit and that the benefits are immediate and profoundly impactful.   -Shared that slipping up here and there doesn't necessarily mean he failed; rather, it's an opportunity to reflect and modify her behaviors and habits and to employ alternate strategies to deal with strong triggers.    Recommendations:   -Encouraged patient to use workbook to help him understand why he smokes, come up with 5 reasons to quit, and to imagine what his future looks like without smoking.   -Encouraged him to develop strategies to distract himself to get past cravings.     Developed Smoking Cessation Relapse Prevention Plan that includes recommendations of:     --Discharge with 7/14 mg patch starter kit and continue to use daily, continue the initial patch for 2-6 weeks of smoking abstinence based on withdrawal symptoms. Taper every 2-6 weeks in 7 mg steps as tolerated.     -Discharge with 2 mg lozenges, take first thing in the morning and as needed for cravings and urges to smoke. May be used every 1-2 hours.     **The USPSTF recommends annual screening for lung cancer with low-dose computed tomography (LDCT) in adults aged 50 to 80 years who have a 20 pack-year smoking history and currently smoke or have quit within the past 15 years.    -Agrees to participate in our post-hosp smoking cessation follow-up calls for treatment and support, starting at 48 hrs post discharge, then at 14 days, 1 month, and at 6 months.     Time Spent: I spent 60 minutes with patient. Will notify provider of recommendations.    Gave contact information and will call 48 hours after discharge to provide support.    Gayle Wynn, RRT, " CTTS  Chronic Pulmonary Disease Specialist  Office: [phone]  Pager: [phone]  Hours: NAOMI 8-4:30

## 2021-11-04 NOTE — PROVIDER NOTIFICATION
-------------------CRITICAL LAB VALUE-------------------    Lab Value: Lactic- 3.0  Time of notification: 9:45 PM  MD notified: M1 CC   Patient status:  Temp:  [97.5  F (36.4  C)-98.7  F (37.1  C)] 97.9  F (36.6  C)  Pulse:  [84-92] 89  Resp:  [16-22] 22  BP: (107-149)/(52-72) 133/70  SpO2:  [93 %-98 %] 93 %  Orders received: Continue to monitor and notify team if pt starts to decompensate, RRT at bedside- ordered stat CT of abdomen and increased pain meds

## 2021-11-04 NOTE — PROVIDER NOTIFICATION
Code sepsis called d/t lactic of 3.0. RRT came to bedside. Ordered stat CT of abd and dilaudid for pain.

## 2021-11-04 NOTE — PHARMACY-ADMISSION MEDICATION HISTORY
Admission Medication History Completed by Pharmacy    See [facility] Admission Navigator for allergy information, preferred outpatient pharmacy, prior to admission medications and immunization status.     Medication History Sources:     Patient    Sure Scripts    Changes made to PTA medication list (reason):    Added: None    Deleted:   o Zinc Sulfate 220 mg capsule - take 220 mg by mouth 2 times daily (per patient, did not work, last taken months ago)    Changed: None    Additional Information:    Patient was able to confirm name, strengths, frequencies, and roughly when each medication was last taken.    Patient reported that he does not consistently take his medications everyday.    Patient denied the use of any other prescription or over the counter medications.     Prior to Admission medications    Medication Sig Last Dose Taking? Auth Provider   furosemide (LASIX) 20 MG tablet TAKE ONE TABLET BY MOUTH EVERYDAY. Past Week Yes Yasmin Gayle MD   LORazepam (ATIVAN) 0.5 MG tablet Take 1 tablet (0.5 mg) by mouth every 4 hours as needed (Anxiety, Nausea/Vomiting or Sleep) Past Week Yes Basim Germain MD   omeprazole (PRILOSEC) 40 MG DR capsule Take 1 capsule (40 mg) by mouth daily Past Week Yes Salvador Covington MD   prochlorperazine (COMPAZINE) 10 MG tablet Take 1 tablet (10 mg) by mouth every 6 hours as needed (Nausea/Vomiting) Past Week Yes Basim Germain MD   spironolactone (ALDACTONE) 50 MG tablet TAKE ONE TABLET BY MOUTH EVERY DAY Past Week Yes Yasmin Gayle MD       Date completed: 11/03/21    Medication history completed by: Jonah Coyle

## 2021-11-04 NOTE — PROGRESS NOTES
[facility]  Palliative Care Daily Progress Note       Recommendations & Counseling       Patient has remained clear that he would prioritize quality of life over quantity and that he would not want to live in the hospital dependent on machines. As such, we recommended that he consider if he would want resuscitation and he wants to change his code status to DNR/DNI. This order was placed.    The oxycodone worked very well to manage his pain, but at six hours he started to have significant increase in pain again. We increased the frequency of his oxycodone from q6h to q4h prn (ordered).     Would recommend a goals of care conference with the different teams involved, tomorrow if possible. Palliative care will plan to be present.       Thank you for the opportunity to continue to participate in the care of this patient and family.  Please feel free to contact on-call palliative provider with any emergent needs.  We can be reached via team pager [phone] (answered 8-4:30 Monday-Friday); after-hours answering service ([phone]);     This patient's care was discussed with palliative care staff, Dr Noriega.    Elise Don MD  Internal Medicine, PGY-3       Assessments          Dima Olivas is a 61 year old male with a past medical history significant for cirrhosis 2/2 alcohol use and untreated Hep C and HCC s/p 1st round of chemotherapy Bevacizumab and Tecentriq on 10/22/2021. He was admitted for acute cirrhosis decompensation in the setting of hepatic hemorrhage likely 2/2 HCC lesion presenting with significant hyperbili, MICHELLE, ABLA, and pancreatitis. PC was consulted for assistance with GOC.     Today, the patient was seen for:  HCC  Decompensated cirrhosis  Abdominal pain  Ascites  Pancreatitis  Goals of care  Patient and family support    Prognosis, Goals, or Advance Care Planning was addressed today with: Yes.   The patient and his wife know that  things are serious and that he likely does not have a lot of time. They do note that they feel like they don't have very clear answers because of the multiple teams involved, but the overall picture is concerning. Jigar shares that he values being active, at home, and with his family; as such, he would not want to live connected to machines or stuck staying in the hospital. He and his wife had been starting to consider his preferences regarding resuscitative efforts, so this was discussed in detail. He does not feel that it is consistent with his goals given the almost certainty that he would end up intubated and with significant morbidity. He wanted his code status changed to DNR/DNI. He and his wife would like to meet with the teams together to get a more complete understanding of his prognosis.    Mood, coping, and/or meaning in the context of serious illness were addressed today: Yes.  Jigar shared that he appreciates being told directly what is going on medically. He would rather hear tough news bluntly so that he has all the information in order to make decisions. He is coping by gathering information and having his wife present to help with decisions.            Interval History:     Chart review/discussion with unit or clinical team members:   Patient triggered sepsis protocol and was found to have elevated lactate. CT abdomen was repeated that showed mostly stable cirrhosis and liver mass, but was concerning for ongoing/worsening hemoperitoneum. Otherwise stable.    Per patient or family/caregivers today:  Patient had a rough night with pain, but has now started taking the oxycodone that was added yesterday for pain with good relief. He reports that it treats the pain very well and he is quite comfortable after the dose. At the time of our assessment, he was right at six hours after his last dose and he became noticeably more uncomfortable throughout the conversation, so thought he may benefit from a shorter  frequency in the dosing.     Key Palliative Symptoms:   Pain location: abdomen  # Pain severity the last 12 hours: moderate  # Dyspnea severity the last 12 hours: none  # Nausea severity the last 12 hours: low  # Anxiety severity the last 12 hours: not assessed    Patient is on opioids: assessed and bowels ok/no needed changes to plan of care today. (Pt taking lactulose for HE)           Review of Systems:     Besides above, a complete 10+ ROS was reviewed and is unremarkable.          Medications:     I have reviewed this patient's medication profile and medications during this hospitalization.           Physical Exam:   Temp: 97.6  F (36.4  C) Temp src: Oral BP: (!) 154/69 Pulse: 87   Resp: 16 SpO2: 98 % O2 Device: None (Room air) Oxygen Delivery: 2 LPM  Gen: Lying in bed, NAD  Eyes: Icteric sclera  CV: RRR  Resp: Breathing non-labored on room air  Abd: Markedly distended, tender in lower quadrants  Msk: No gross deformity  Skin:  Diffuse jaundice  Ext: warm, well perfused  Neuro: A&O x 3, mentation slightly slowed  Mental status/Psych: Calm, does not appear anxious or depressed             Data Reviewed:     Reviewed recent labs and pertinent imaging  CT abdomen/pelvis:  1. Relatively unchanged in size of hematoma inferior to the left  hepatic mass.  2. Increase in density of diffuse ascites which may indicate some  degree of increasing hemoperitoneum.  3. Unchanged cirrhotic configuration of the liver with left hepatic  mass.  4. Unchanged splenomegaly suggestive of portal venous hypertension.  5. Calcific cholelithiasis.     MELD-Na score: 39 at 11/4/2021  4:32 AM  MELD score: 39 at 11/4/2021  4:32 AM  Calculated from:  Serum Creatinine: 3.67 mg/dL at 11/4/2021  4:32 AM  Serum Sodium: 130 mmol/L at 11/4/2021  4:32 AM  Total Bilirubin: 23.8 mg/dL at 11/4/2021  4:32 AM  INR(ratio): 2.07 at 11/4/2021  4:32 AM  Age: 61 years

## 2021-11-04 NOTE — CODE/RAPID RESPONSE
"Rapid Response Team Note    Assessment   In assessment a rapid response was called on Dima Olivas due to SIRS/Sepsis trigger and lactic acidosis. This presentation is likely due to liver disease and possible infection.    Plan   -repeat CT a/p to assess known hematoma [result with \"Increase in density of diffuse ascites which may indicate some degree of increasing hemoperitoneum\"]  -currently normotensive; no additional fluids at this time - considered giving d/t increasing LA but already got 100g albumin today in addition to just receiving blood  -one-time extra dose dilaudid for abd pain, 0.5mg   -recheck LA in AM    -  The [facility] primary team was notified of CT results.  -  Disposition: The patient will remain on the current unit. We will continue to monitor this patient closely.  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 Resulting LA draw 3.0.    Admission Diagnosis:   Other ascites [R18.8]  Acute kidney injury (H) [N17.9]  Acute liver failure without hepatic coma [K72.00]  Acute biliary pancreatitis, unspecified complication status [K85.10]    Physical Exam   Temp: 97.6  F (36.4  C) Temp  Min: 97.5  F (36.4  C)  Max: 98.7  F (37.1  C)  Resp: 20 Resp  Min: 16  Max: 22  SpO2: 93 % SpO2  Min: 93 %  Max: 98 %  Pulse: 89 Pulse  Min: 84  Max: 92    No data recorded  BP: (!) 143/72 Systolic (24hrs), Av , Min:107 , " "Max:149   Diastolic (24hrs), Av, Min:52, Max:72     I/Os: I/O last 3 completed shifts:  In: 1015 [P.O.:15; I.V.:650]  Out: 1025 [Urine:1025]     Exam:   General: chronically ill appearing. Grimacing in pain  Mental Status: baseline mental status.  Resp: LS clear upper lobes, decreased bases  CV: systolic murmur loudest aortic area. S1, s2, RRR  ABD: BS hypoactive. Tenderness to palpation. Distension     Significant Results and Procedures   Lactic Acid:   Recent Labs   Lab Test 21  1950 21  1747 21  1607   LACT 3.0* 2.6* 2.1*     CBC:   Recent Labs   Lab Test 21  1949 21  1355 21  0745 21  2211 21  1836   WBC  --  12.1* 13.8*  --  13.4*   HGB 7.4* 6.9* 6.3*   < > 7.4*   HCT  --  21.5* 20.0*  --  23.3*   PLT  --  79* 88*  --  113*    < > = values in this interval not displayed.      Sepsis Evaluation   The patient is suspected to have an infection.  Dima Olivas meets SIRS criteria AND has a lactate >2 or other evidence of acute organ damage.  These vital signs, lab and physical exam findings constitute a diagnosis of SEVERE SEPSIS.    Sepsis Time-Zero (time severe sepsis diagnosis confirmed):   21 as this was the time when Lactate resulted, and the level was > 2.0     Anti-infectives (From now, onward)    Start     Dose/Rate Route Frequency Ordered Stop    21 0000  piperacillin-tazobactam (ZOSYN) 2.25 g vial to attach to  ml bag     Note to Pharmacy: For [facility], [facility] and [facility]: For Zosyn-naive patients, use the \"Zosyn initial dose + extended infusion\" order panel.    2.25 g  over 30 Minutes Intravenous EVERY 6 HOURS 21 1750          Current antibiotic coverage is appropriate for source of infection.    3 Hour Severe Sepsis Bundle Completion:  1. Initial Lactic Acid result shown above. Repeat lactic acid is not indicated.   2. Blood Cultures before Antibiotics: Yes  3. Broad Spectrum Antibiotics Administered: yes  4. Fluids: Fluid bolus not " indicated due to: ESLD

## 2021-11-04 NOTE — PROGRESS NOTES
[facility]    Progress Note - Maroon 1 Service        Date of Admission:  11/2/2021    Assessment & Plan            Dima Olivas is a 61 year old male admitted on 11/2/2021. 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     Today:  -S/p 2u pRBCs for Hgb of 5.4   -Plan for care conference 11/5 at noon   -Cytology added to paracentesis sample   -Oxycodone 5mg q4hr  PRN   -2g K+ diet  -2L fluid restriction  -Cytology added to ascites fluid lab    -Code status changed DNR/DNI     #Cirhossis 2/2 EtOH + HCV  #Acute decompensation of cirrhosis   #Ascites  #Portal hypertensive gastropathy   #Coagulopathy   MELD-Na score: 39 at 11/4/2021  4:32 AM  MELD score: 39 at 11/4/2021  4:32 AM  Calculated from:  Serum Creatinine: 3.67 mg/dL at 11/4/2021  4:32 AM  Serum Sodium: 130 mmol/L at 11/4/2021  4:32 AM  Total Bilirubin: 23.8 mg/dL at 11/4/2021  4:32 AM  INR(ratio): 2.07 at 11/4/2021  4:32 AM  Age: 61 years    Presenting with acute abdominal pain, new ascites (reported first episode). On spironolactone 50, lasix 20 at home. AOx3. Decompensation may be 2/2 to continued alcohol use vs progression of HCC. INR 1.92, likely 2/2 acute synthetic dysfunction. CT Chest/Abd/Pelv significant for blood in peritoneal cavity 2/2 to hepatic hemorrhage from HCC lesion. Likely acute and causing this acute decompensation. Denies melena, or hematochezia.  EGD 9/24/2021 w/o evidence of varices, but notable for portable hypertensive gastropathy. Concern for SBP given acute presentation with large ascites, per 250/PMN unlikely SBP Paracentesis significant for yessy, bloody output 500mL.   Pt and wife informed of brevity of the  situation, and are open to palliative discussion for goals of care.     -Plan for care conference 11/5 at noon  -s/p paracentesis    >unlikely SBP based on correction, culture NGTD   >cytology added to paracentesis sample   -Repeat albumin 100g 11/34  -Vit K challenge, 5 mg SQ or PO x3/3 days  -IV Zosyn q6 (renal adjustment)   -Dilaudid 0.3mg q2hr PRN   -Oxycodone 5mg Q4H PRN  -Lactulose TID   -HOLD spironolactone   -HOLD lasix  -PTA PPI   -Daily MELD labs   -Hgb q8 hrs   -Hepatology following, appreciate recs  -Palliative consult, appreciate recs       #Acute Blood Loss Anemia   #Normocytic anemia   #Hepatic hemorrhage  New 3gm Hgb drop 10/22 -> now. 11.7 --> 8.1.Denies symptoms of palpitations, lightheadedness, pre-syncope, or dizziness. Vitals stable, no hypotension, tachycardia, or dyspnea. CT significant for 3.8 x 6.8 x 7.3 cm hematoma in the anterior abdomenextending inferiorly from large mass in segment 2/3 of the liver.  Noactive extravasation is demonstrated on CT scan. Likely 2/2 hemorrhagic HCC lesion. Paracentesis frankly bloody. IR following - if continued decrease in Hgb can consider embolization with risk of worsening liver function. Repeat CT ON 11/3/2021 illustrating increasing density of abdominal ascites/fluid concerning for continued bleeding into peritoneal cavity. S/p 4uPRB, 2u transfused 11/4 for drop 7.0 --> 5.4.   Paged IR regarding acute hemoglobin drop, again reported could proceed with emobolization, however discussed with pt risks vs benefit and agreed he would like to wait until care conference before pursuing and opting resuscitation with pRBC PRN.      -Hgb q8hrs   -Transfusion <7   >Pt consented  -Stat CT if pt illustrates acute Hgb drop concern of bleeding.  -AM CBC       #MICHELLE   New MICHELLE, baseline ~0.92. Elevated acutely 2.82 in the setting of new ascites. Reported poor urine output even with continued use of lasix/spirinolactone. No dysuria, no hematuria. RUQ US does not illustrate  "any hydronephrosis. Intravascular hypovolemia in the setting of third spacing possibility. Must consider that pt may be developing Type 1, and less likely Type 2, HRS given the acute decompensation cirrhosis. Additionally, acute elevation in bili may be causing acute hyperbili nephrotoxicity. Reports some improvement of urine output following albumin, but Cr worsening now 3.67 concern for progression.     -S/p 100g 11/4/2021  -BID CMP   >K >5.5 shift with bicarb   -2g K+ diet  -2L fluid restriction   -Mg  -Phos   -AM VBG  -Bladder pressure x1  -Strict I/Os  -Daily weights   -Renal consulted, appreciate recs    #Acute Pancreatitis   #BISAP 1   Arrived to ED with complaints of lower abdominal pain in the setting of acute cirrhosis decompensation. Lipase elevated 6k. First noted abdominal pain days following 1st round of chemotherapy atezolizumab/nevacozimab, noted that he noted feeling \"worse\" following chemotherapy. Most likely 2/2 acute alcohol use. History of stones but no CBD dilatation. Non-specific history of possible biliary colic. No history of abdominal trauma. No new medications.     - Advance to [facility]  - Goal of 0.5-1cc/kg/hour urine output  - Dilaudid 0.3mg q2hr PRN    -Avoid medications associated with pancreatitis (most commonly, loop diuretics, certain antibiotics, mesalamines, immunomodulators, valproic acid).      #Hyponatremia   New hypoNa 128, decreased from 10/22 at 140. Reports few episode of emesis. Approximately 10 day history of generalized anorexia, with intermittent non-blood emesis. Poor PO intake. In the setting of ascites, and volume overload per physical exam (JVD, mild crackles in lung bases bilaterally) and MICHELLE. Concern for intravascular hypovolemia hypoNa vs hypervolemic hypoNa. Must consider that pt may be developing Type 1, and less likely Type 2, HRS given the acute decompensation cirrhosis. Urine Na 7, Urine osms 370. With fluid restriction, and interventions increased to 130.    -AM " CMP   -Strict I/Os   -Daily weights   -Renal consulted, appreciate recs.     #HCC   HCC 2/2 untreated HCV, presumably from history of IV drug use per patient. S/p 1 dose chemotherapy with Bevacizumab and Tecentriq on 10/22/2021.     -Oncology following, appreciate recs       #Transaminitis   At time of infusion 10/22, transaminits 86/182 ALT > AST. CMP 10/13 , ALT 64. Chronic AST elevation.  New significant ALT elevation 184, . In the setting of diminished liver parenchyma given cirrhosis, still may be acute alcoholic pancreatitis given the especially elevated bili.     -AM CMP       #Acute on chronic Juandice  New worsened hyperbilirubinemia. ~2 1mo-2wks ago. RUQ US negative for acute obstruction with stone, or dilation of biliary tree. Discussion with panc/bili GI, unlikely obstructive cause of jaundice without observed dilation of biliary tree. Still actively using alcohol, approximately 5-6 drinks per week decreased 1 drink since symptoms started 1 weeks ago. Jaundice through abdomen, acute pruritis.  Given alcohol use history, may be from alcoholic hepatitis.     #Polysubstance Use   #AUD  #Nicotine Use   Long standing alcohol use, and nicotine use. Recently cut down from 5-6 drinks per night to 1 during acute illness past 10 days. Has never tried to quit in the past, is now interested. Has cut down from 1ppd to 1/3ppd in regards to nicotine. Open to speaking to chem dep.Last drink of alcohol approximately 24 hours ago.     -Nicotine patch in place.   -CIWA protocol     #Acute Deconditioning  #Malnutrition   Acute deconditioning and malnutrition in setting of catabolic state of cirrhosis. Presently NPO for acute pancreatitis, but plan for high protein diet when he can tolerate.     -IP nutrition consult, appreciate recs  -PT consult, appreciate recs        Diet: Full Liquid Diet    DVT Prophylaxis: Pneumatic Compression Devices  Yanes Catheter: Not present  Fluids: None   Central Lines: None  Code  Status: Full Code      Disposition Plan   Expected discharge: 11/07/2021   recommended to prior living arrangement once imrpovement in liver function.     The patient's care was discussed with the Attending Physician, Dr. Tee.    MD Tara Gonzalez[address]  Securely message with the Vocera Web Console (learn more here)  Text page via [employer] Paging/Directory    Please see sign in/sign out for up to date coverage information       ______________________________________________________________________    Interval History   Reports that distension is unchanged from yesterday. Does believe that pain is appropriately controlled with oxycodone. Continues to have improvement in urine output. Still experiencing abdominal pain in between oxycodone doses. Discussion with palliative helpful, change in code status to DNR/DNI. Opted for pRBC resuscitation over acute embolization.     Data reviewed today: I reviewed all medications, new labs and imaging results over the last 24 hours. I personally reviewed no images or EKG's today.    Physical Exam   Vital Signs: Temp: 97.8  F (36.6  C) Temp src: Oral BP: 131/60 Pulse: 86   Resp: 18 SpO2: 96 % O2 Device: None (Room air) Oxygen Delivery: 2 LPM  Weight: 195 lbs 5.24 oz    Physical Exam  Constitutional:       General: He is not in acute distress.  HENT:      Head: Normocephalic and atraumatic.      Mouth/Throat:      Mouth: Mucous membranes are moist.   Eyes:      General: Scleral icterus present.      Comments: miosis,    Neck:      Vascular: JVD present. Improved.   Cardiovascular:      Rate and Rhythm: Normal rate and regular rhythm.      Pulses:           Radial pulses are 3+ on the right side and 3+ on the left side.      Heart sounds: Murmur heard.   Gallop present. S4 sounds present.       Comments: Hyperdynamic   Pulmonary:      Effort: Pulmonary effort is normal.      Breath sounds: Rales present.    Abdominal:      General: Abdomen is protuberant. There is distension.      Palpations: There is hepatomegaly.      Tenderness: There is no abdominal tenderness. There is no guarding.      Comments: unchanged distension     Musculoskeletal:      Right lower leg: Edema present.      Left lower leg: Edema present.   Skin:     General: Skin is warm and dry.      Capillary Refill: Capillary refill takes less than 2 seconds.      Coloration: Skin is jaundiced.   Neurological:      Mental Status: He is alert.     Data   Recent Labs   Lab 11/04/21  0432 11/03/21  1949 11/03/21  1355 11/03/21  0745 11/03/21  0745 11/02/21  1747 11/02/21  0644   WBC 10.8  --  12.1*  --  13.8*   < > 13.4*   HGB 7.1* 7.4* 6.9*   < > 6.3*   < > 8.1*   MCV 93  --  94  --  96   < > 97   PLT 77*  --  79*  --  88*   < > 135*   INR 2.07*  --   --   --  2.21*  --  1.92*   * 130*  130*  --   --  128*  --  128*   POTASSIUM 4.0 4.4  4.4  --   --  4.9  --  4.2   CHLORIDE 99 97  97  --   --  97  --  95   CO2 19* 18*  18*  --   --  17*  --  23   BUN 95* 93*  93*  --   --  89*  --  75*   CR 3.67* 3.58*  3.58*  --   --  3.77*  --  2.82*   ANIONGAP 12 15*  15*  --   --  14  --  10   HERNESTO 7.7* 7.5*  7.5*  --   --  7.5*  --  7.6*   GLC 97 117*  117*  --   --  91  --  132*   ALBUMIN 2.8* 2.9*  --    < > 2.0*  --  1.5*   PROTTOTAL 5.6* 6.1*  --    < > 5.3*  --  6.1*   BILITOTAL 23.8* 25.7*  --    < > 24.4*  --  25.3*   ALKPHOS 93 100  --    < > 114  --  183*   ALT 85* 100*  --    < > 113*  --  182*   AST 78* 83*  --    < > 94*  --  174*   LIPASE  --   --   --   --   --   --  6,327*    < > = values in this interval not displayed.     Recent Results (from the past 24 hour(s))   CT Abdomen Pelvis w/o Contrast   Result Value    Radiologist flags Increase in density of diffuse ascites which may (AA)    Narrative    EXAMINATION: CT ABDOMEN PELVIS W/O CONTRAST, 11/3/2021 9:12 PM    TECHNIQUE:  Helical CT images from the lung bases through the  symphysis  pubis were obtained without IV contrast.    COMPARISON: CT chest abdomen pelvis 11/2/2021    HISTORY: Abdominal distension; please eval for expanding hematoma    FINDINGS:    Liver: Cirrhotic configuration of the liver with masslike expansile  portion within the left lobe which correlates with known  hepatocellular carcinoma..  Biliary system: Gallbladder is within normal limits. Calcified  cholelithiasis.  Pancreas: No focal mass or dilation of the main pancreatic duct.  Stomach: Within normal limits.  Spleen: Splenomegaly.  Adrenal glands: Within normal limits.  Kidneys: Unchanged left renal cyst. Bilateral persistent renal  nephrograms..  Bladder: Contrast opacification of the bladder.  Reproductive organs: The left testicles within inguinal canal.  Colon: Within normal limits.  Appendix: Within normal limits.  Small Bowel: Within normal limits.  Lymph nodes: No intra-abdominal or pelvic lymphadenopathy.  Vasculature: Aortobiiliac atherosclerotic disease.  Peritoneum: Large ascites. Similar appearance of hematoma inferior to  the left lower lobe hepatic mass within the peritoneum which measures  8 x 4 x 4.9 cm, previously 6.5 x 4 x 5.8 cm although the ascites is  now measuring of diffusely increased density compared to the prior  exam which may indicate increasing component of hemoperitoneum.    Lung bases: Bibasilar atelectasis..    Bones and soft tissues: Unchanged fracture deformities of the right  posterior 10th and 11th ribs.. No suspicious osseous lesion.      Impression    IMPRESSION:   1. Relatively unchanged in size of hematoma inferior to the left  hepatic mass.  2. Increase in density of diffuse ascites which may indicate some  degree of increasing hemoperitoneum.  3. Unchanged cirrhotic configuration of the liver with left hepatic  mass.  4. Unchanged splenomegaly suggestive of portal venous hypertension.  5. Calcific cholelithiasis.     [Critical Result: Increase in density of diffuse ascites which  may  indicate some degree of increasing hemoperitoneum]    Finding was identified on 11/3/2021 9:33 PM.     Dr. Ivet Hernandez was contacted by Dr. LYNN CALZADA MD at  11/3/2021 9:51 PM and verbalized understanding of the critical  finding.     I have personally reviewed the examination and initial interpretation  and I agree with the findings.    SAKINA MACKEY MD         SYSTEM ID:  [id number]

## 2021-11-04 NOTE — PLAN OF CARE
Plan of Care [id number]  Neuro:  AOx3-4, unaware of month but able to say year. Easily redirected. CIWAS continued q 4, pt scored 2-3.  PERRLA, able to follow commands.  CVC:  NSR with infrequent PACs bigeminal PVCs, team aware. SBPs have been in 120-140s  Resp:  Satting well on 2L NC.  GI:  2 BM this shift, BS active x4. Abd is firm/taut/distended.   :  AUO in primofit  Diet/Appetite:  Tolerating clear liquid diet   Activity:  Ax1   Pain:  Pt c/o 9/10 pain during evening in his lower abd dt ascites, dilaidid given, did not resolve. 5 mg oxy given after pt got back from CT of abd, pt claimed it helped to resolve pain. Oxy given again around 5 am.   Skin:  Skin is fairly jaudiced and edematous, no overt skin breakdown noted this shift   LDA:  2 PIV in place, primofit in place dt urgency   Plan:  Continue to control pain, notify primary team with any changes in pt condition

## 2021-11-04 NOTE — PROGRESS NOTES
Nephrology Progress Note  November 4, 2021      Dima Olivas MRN:[id number] YOB: 1960  Date of Admission:11/2/2021  Primary care provider: Tracy Feliz  Requesting physician: Skylar Tee MD    ASSESSMENT AND RECOMMENDATIONS:   Mr. Olivas is a 60 yo M with PMH of cirrhosis, HCC now presenting with new MICHELLE.      #MICHELLE  Baseline kidney function 0.7-1. Potassium normal, patient producing urine. Able to concentrate urine. FeNa 0.2%, pattern pre-renal. BP well controlled without any hypotension documented. Hyperbilirubinemia in the setting of intra abdominal bleeding, total protein decreased. Hypoalbuminemia improved after IV albumin infusion. Creatinine stable at 3.67. May have MICHELLE from abdominal compartment due to acute fluid collection secondary to intra-abdominal fluid due to bleeding and acute pancreatitis. HRS is also possible but his BP is normal and he has not had ascites until now.  Bili is high enough to cause irreversible bile cast nephropathy. Renal function plateaued, will continue to monitor.   - CMP every 12 hours  - phosphorus/magnesium  - please obtain VBG  - continue holding diuretics  - restrict potassium intake (less than 2g daily)  - consider obtaining bladder pressures  - consider therapeutic paracentesis if possible from a coagulation and hemodynamics standpoint  - albumin to maintain bp >70 mm Hg  - would not be a good candidate for dialysis  - Weigh daily     #Hyponatremia, euvolemic, isotonic, improving  Normal serum osmolality, urine osm less than maximally dilute.  Currently asymptomatic. FeNa 0.2%.  Difficult to tell renal perfusion but doubt he would improve with IVF given his abdominal distention.   - CMP every 12 hours  - Strict I/Os  - Wander obtain daily weights  - Patient started clear liquid diet, limit daily intake to 2L.      Recommendations were communicated to primary team via phone and note.      Jacoby Mendez MD  Internal Medicine Resident,  "PGY-1  [id number]    I have seen and examined this patient with the resident.  This note reflects our joint assessment and plan.     Sara Paula MD     REASON FOR CONSULT: MICHELLE    Overnight Interval: Patient was found to have lactic acidosis overnight, unspecified cause. Hemodynamically stable. Liver hematoma seemed expanding on stat CT. Patient feeling better today.       PHYSICAL EXAM:   Temp  Av.6  F (36.4  C)  Min: 96  F (35.6  C)  Max: 98.7  F (37.1  C)      Pulse  Av.1  Min: 75  Max: 92 Resp  Av.9  Min: 16  Max: 22  SpO2  Av.9 %  Min: 90 %  Max: 100 %       /60 (BP Location: Left arm)   Pulse 86   Temp 97.8  F (36.6  C) (Oral)   Resp 18   Ht 1.777 m (5' 9.96\")   Wt 88.6 kg (195 lb 5.2 oz)   SpO2 96%   BMI 28.06 kg/m        Admit Weight: 88.9 kg (196 lb)       GENERAL APPEARANCE: Awake, mildly somnlent, NAD  EYES: scleral icterus, pupils equal  Endo: No goiter, No moon facies  Lymphatics: no cervical or supraclavicular LAD  Pulmonary: lungs clear to auscultation with equal breath sounds bilaterally, no clubbing  CV: regular rhythm, tacycardic, no rub   - No JVD   - CARMEN improved today  GI: distended, tender to palpation, bowel sounds decreased  MS: no evidence of inflammation in joints, no muscle tenderness  : no villanueva  SKIN: jaundiced without rash  NEURO: face symmetric, no asterixis     LABS:   CMP  Recent Labs   Lab 21  0432 21  1949 21  0745 21  0644   * 130*  130* 128* 128*   POTASSIUM 4.0 4.4  4.4 4.9 4.2   CHLORIDE 99 97  97 97 95   CO2 19* 18*  18* 17* 23   ANIONGAP 12 15*  15* 14 10   GLC 97 117*  117* 91 132*   BUN 95* 93*  93* 89* 75*   CR 3.67* 3.58*  3.58* 3.77* 2.82*   GFRESTIMATED 17* 18*  18* 16* 23*   HERNESTO 7.7* 7.5*  7.5* 7.5* 7.6*   MAG  --  3.2*  --   --    PROTTOTAL 5.6* 6.1* 5.3* 6.1*   ALBUMIN 2.8* 2.9* 2.0* 1.5*   BILITOTAL 23.8* 25.7* 24.4* 25.3*   ALKPHOS 93 100 114 183*   AST 78* 83* 94* 174*   ALT 85* 100* " 113* 182*     CBC  Recent Labs   Lab 11/04/21  0432 11/03/21  1949 11/03/21  1355 11/03/21  0745 11/02/21  2211 11/02/21  1836   HGB 7.1* 7.4* 6.9* 6.3*   < > 7.4*   WBC 10.8  --  12.1* 13.8*  --  13.4*   RBC 2.34*  --  2.28* 2.09*  --  2.45*   HCT 21.7*  --  21.5* 20.0*  --  23.3*   MCV 93  --  94 96  --  95   MCH 30.3  --  30.3 30.1  --  30.2   MCHC 32.7  --  32.1 31.5  --  31.8   RDW 18.1*  --  17.1* 17.6*  --  17.6*   PLT 77*  --  79* 88*  --  113*    < > = values in this interval not displayed.     INR  Recent Labs   Lab 11/04/21 0432 11/03/21  0745 11/02/21  0644   INR 2.07* 2.21* 1.92*     ABGNo lab results found in last 7 days.   URINE STUDIES  Recent Labs   Lab Test 11/03/21  0454 10/22/21  1300   COLOR Dark Yellow*  --    APPEARANCE Clear  --    URINEGLC Negative  --    URINEBILI Moderate*  --    URINEKETONE Negative  --    SG 1.027  --    UBLD Trace*  --    URINEPH 5.0  --    PROTEIN 10 * Trace*   NITRITE Negative  --    LEUKEST Negative  --    RBCU 1  --    WBCU 3  --      No lab results found.  PTH  No lab results found.  IRON STUDIES  No lab results found.    IMAGING:  All imaging studies reviewed by me.

## 2021-11-04 NOTE — PROGRESS NOTES
"[facility]    Hepatology Follow-up    CC: Jaundice    Dx:      Intra-abdominal hematoma              Infiltrative HCC              Decompensated cirrhosis (ascites) 2/2 ETOH/HCV              Untreated HCV              Alcohol use disorder with active use    24 hour events: hemoglobin trended down to 5.4 from 7.1 and is now requiring 2U PRBC additional     Subjective: Feels well and improved from yesterday    Patient denies fevers, sweats or chills.    Medications  Current Facility-Administered Medications   Medication Dose Route Frequency     HYDROmorphone  0.5 mg Intravenous Once     lactulose  10 g Oral TID     nicotine  1 patch Transdermal Daily     nicotine   Transdermal Q8H     pantoprazole  40 mg Oral Daily     piperacillin-tazobactam  2.25 g Intravenous Q6H     sodium chloride (PF)  3 mL Intracatheter Q8H       Review of systems  A 10-point review of systems was negative.    Examination  /60 (BP Location: Left arm)   Pulse 86   Temp 97.8  F (36.6  C) (Oral)   Resp 18   Ht 1.777 m (5' 9.96\")   Wt 88.6 kg (195 lb 5.2 oz)   SpO2 96%   BMI 28.06 kg/m      Intake/Output Summary (Last 24 hours) at 11/4/2021 0838  Last data filed at 11/4/2021 0530  Gross per 24 hour   Intake 1273.33 ml   Output 1050 ml   Net 223.33 ml       Gen- jaundiced, lying in bed  RS- clear to auscultation  Abd- distended  Neuro- no asterixis  Skin-  jaudiced  Psych- normal mood    Laboratory  Lab Results   Component Value Date     11/04/2021     07/07/2021    POTASSIUM 4.0 11/04/2021    POTASSIUM 3.7 07/07/2021    CHLORIDE 99 11/04/2021    CHLORIDE 110 07/07/2021    CO2 19 11/04/2021    CO2 28 07/07/2021    BUN 95 11/04/2021    BUN 8 07/07/2021    CR 3.67 11/04/2021    CR 0.89 07/07/2021       Lab Results   Component Value Date    BILITOTAL 23.8 11/04/2021    BILITOTAL 3.3 07/07/2021    ALT 85 11/04/2021    ALT 59 07/07/2021    AST 78 11/04/2021     07/07/2021    ALKPHOS 93 " 11/04/2021    ALKPHOS 149 07/07/2021       Lab Results   Component Value Date    WBC 10.8 11/04/2021    WBC 4.4 07/07/2021    HGB 7.1 11/04/2021    HGB 10.6 07/07/2021    MCV 93 11/04/2021     07/07/2021    PLT 77 11/04/2021    PLT 57 07/07/2021       Lab Results   Component Value Date    INR 2.07 11/04/2021    INR 1.55 07/07/2021     MELD-Na score: 39 at 11/4/2021  4:32 AM  MELD score: 39 at 11/4/2021  4:32 AM  Calculated from:  Serum Creatinine: 3.67 mg/dL at 11/4/2021  4:32 AM  Serum Sodium: 130 mmol/L at 11/4/2021  4:32 AM  Total Bilirubin: 23.8 mg/dL at 11/4/2021  4:32 AM  INR(ratio): 2.07 at 11/4/2021  4:32 AM  Age: 61 years      Assessment  Mr. Olivas is a 61 year old man with decompensated cirrhosis due to HCV/ETOH (ascites) with ongoing alcohol use and infiltrative HCC on bevacizumab/atezolizumab and hepatology is consulted in the context of jaundice.  Found to have intra-abdominal hematoma due to HCC.    We are concerned about his overall clinical course and lack of therapeutic options.  MELD 39 and high 3 month mortality based on that alone.  In addition to this - there is no further therapy available to him at this point between Eleazar Jang C status, cirrhosis, infiltrative HCC and now with internal bleeding. On top of this, he is requiring significant transfusion.    Given his overall illness, lack of available therapy and previously expressed wishes (to be with his family, quality of life) we agree with the primary team's idea of a care conference tomorrow to discuss where we go from here.  Our best guess is that he is in the last days to weeks of life. We recommend hospice.    While he is still receiving ongoing medical therapy for now, it would be helpful to add cytology on to his prior paracentesis as malignancy in the peritoneal fluid may guide the conversation further.    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  -- Transfusion for hematoma/bleeding as per primary service.  -- Please add cytology on to paracentesis sample if possible.    Salvador Oleary MD  Gastroenterology Fellow, PGY-5    Case staffed with attending, Dr. Matt.    Hepatology will continue to follow.  ATTENDING NOTE, GASTROENTEROLOGY/HEPATOLOGY    I saw and discussed this patient with the fellow and participated in the decision making. I agree with the fellow's note. Meli Matt MD

## 2021-11-04 NOTE — PROGRESS NOTES
Hematology / Oncology  Daily Progress Note   Date of Service: 11/04/2021  Patient: Dima Olivas  MRN: [id number]  Admission Date: 11/2/2021  Hospital Day # 2  Cancer Diagnosis: HCC   Primary Outpatient Oncologist: Dr Germain   Current Treatment Plan: Atezolizumab/Bevacizumab (C1D1 = 10/22/21)    Assessment & Plan:   Dima Olivas is a 61 year old male admitted on 11/2/2021. He has a history of cirrhosis 2/2 alcohol use and untreated Hep C and HCC initiated on Bevacizumab/Tecentriq cycle 1=10/22/2021. Now admitted for acute cirrhosis decompensation in the setting of hepatic hemorrhage likely 2/2 HCC lesion presenting with significant hyperbili, MICHELLE and pancreatitis. CT CAP on admission with large abdominal hematoma and large volume ascites.     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 We discussed with the patient and primary team that bevacizumab increases risk for bleeding and clotting. CT CAP on admission with large abdominal hematoma and large volume ascites. Could be due to response from bevacizumab vs hyper progression. S/p paracentesis with only 500 ml off, awaiting cultures but bloody appearance and primarily RBCs so not likely SBP. IR consulted for consideration of hepatic tumor embolization, no intervention at this time given too high risk to perform embolization but may consider if there is evidence of continued bleeding.     We appreciate input from all consultants in this complex case including IR, GI, nephro and palliative care. Overall, we suspect obstruction 2/2 increased intraabdominal pressure with large hematoma 2/2 hemorrhagic liver lesion driving other acute issues including acute anemia, MICHELLE and transaminitis. However, it is  "important to also consider possible immunotherapy related inflammatory process warranting HD steroids if clinical picture does not improve with current supportive cares.     Recommendations:   - Serial Hgb and supportive cares as you are doing with pRBCs for hgb <7. Drain ascites fluid as able.   - Recommended patient discuss goals and code status with his family, he tole be he prefers quantity over quality and would not want resuscitation but wanted to discuss further with his wife today.   - Next due for Atezo/Liz 11/12, will likely need to delay or change treatment regimen. Dr Germain updated on hospitalization.     Patient was seen and plan of care was discussed with attending physician Dr. Yo.    Thank you for the opportunity to partake in this patients plan of care. Please do not hesitate to page with questions. We will continue to follow.     Lizet MadrigalNemours FoundationJULITO Hsu    Hematology/Oncology   Pager: [phone]   ___________________________________________________________________    Subjective & Interval History:    Afebrile and HD stable. Received 2u pRBCs with stable hemoglobin this morning at 7.1. Poor sleep 2/2 9/10 abdominal pain improved with PRN Dilaudid and pain now 5/10. Scared about current situation and possibility of acute decompensation but hopeful things with stabilize with conservative management. Plans to discuss code status with his wife and 5 sons today. Loose stools overnight, no blood in stools or urine. >1L urine output.     Physical Exam:    Blood pressure 131/60, pulse 86, temperature 97.8  F (36.6  C), temperature source Oral, resp. rate 18, height 1.777 m (5' 9.96\"), weight 88.6 kg (195 lb 5.2 oz), SpO2 96 %.    General: lying in bed, chronically ill appearing, significant jaundice   HEENT: sclera icteric  Neck: supple, normal ROM  CV: RRR, normal S1/S2, no m/r/g  Resp: CTAB, no wheezing/crackles, normal respiratory effort on ambient air  GI: distended, non tender, " epigastric palpable mass, bowel sounds present and normoactive  MSK: warm and well-perfused, normal tone, trace LE edema   Skin: no rashes on limited exam   Neuro: Alert and interactive but fatigued, moves all extremities equally, no focal deficits    Labs & Studies: I personally reviewed the following studies:  ROUTINE LABS (Last four results):  CMP  Recent Labs   Lab 11/04/21  0432 11/03/21 1949 11/03/21  0745 11/02/21  0644   * 130*  130* 128* 128*   POTASSIUM 4.0 4.4  4.4 4.9 4.2   CHLORIDE 99 97  97 97 95   CO2 19* 18*  18* 17* 23   ANIONGAP 12 15*  15* 14 10   GLC 97 117*  117* 91 132*   BUN 95* 93*  93* 89* 75*   CR 3.67* 3.58*  3.58* 3.77* 2.82*   GFRESTIMATED 17* 18*  18* 16* 23*   HERNESTO 7.7* 7.5*  7.5* 7.5* 7.6*   MAG  --  3.2*  --   --    PROTTOTAL 5.6* 6.1* 5.3* 6.1*   ALBUMIN 2.8* 2.9* 2.0* 1.5*   BILITOTAL 23.8* 25.7* 24.4* 25.3*   ALKPHOS 93 100 114 183*   AST 78* 83* 94* 174*   ALT 85* 100* 113* 182*     CBC  Recent Labs   Lab 11/04/21 0432 11/03/21 1949 11/03/21  1355 11/03/21  0745 11/02/21  2211 11/02/21  1836   WBC 10.8  --  12.1* 13.8*  --  13.4*   RBC 2.34*  --  2.28* 2.09*  --  2.45*   HGB 7.1* 7.4* 6.9* 6.3*   < > 7.4*   HCT 21.7*  --  21.5* 20.0*  --  23.3*   MCV 93  --  94 96  --  95   MCH 30.3  --  30.3 30.1  --  30.2   MCHC 32.7  --  32.1 31.5  --  31.8   RDW 18.1*  --  17.1* 17.6*  --  17.6*   PLT 77*  --  79* 88*  --  113*    < > = values in this interval not displayed.     INR  Recent Labs   Lab 11/04/21  0432 11/03/21  0745 11/02/21  0644   INR 2.07* 2.21* 1.92*       Medications list for reference:  Current Facility-Administered Medications   Medication     0.9% sodium chloride BOLUS     HYDROmorphone (PF) (DILAUDID) injection 0.3 mg     HYDROmorphone (PF) (DILAUDID) injection 0.5 mg     [START ON 11/5/2021] lactulose (CHRONULAC) solution 10 g     lidocaine (LMX4) cream     lidocaine 1 % 0.1-1 mL     melatonin tablet 3 mg     naloxone (NARCAN) injection 0.2 mg    Or      naloxone (NARCAN) injection 0.4 mg    Or     naloxone (NARCAN) injection 0.2 mg    Or     naloxone (NARCAN) injection 0.4 mg     nicotine (NICODERM CQ) 14 MG/24HR 24 hr patch 1 patch     nicotine Patch in Place     ondansetron (ZOFRAN) injection 4 mg     oxyCODONE (ROXICODONE) tablet 5 mg     pantoprazole (PROTONIX) EC tablet 40 mg     piperacillin-tazobactam (ZOSYN) 2.25 g vial to attach to  ml bag     sodium chloride (PF) 0.9% PF flush 3 mL     sodium chloride (PF) 0.9% PF flush 3 mL

## 2021-11-05 NOTE — PROGRESS NOTES
"[facility]    Hepatology Follow-up    CC: Jaundice    Dx:      Intra-abdominal hematoma              Infiltrative HCC              Decompensated cirrhosis (ascites) 2/2 ETOH/HCV              Untreated HCV              Alcohol use disorder with active use    24 hour events: hemoglobin remains stable     Subjective: feels mildly improved today    Patient denies fevers, sweats or chills.    Medications  Current Facility-Administered Medications   Medication Dose Route Frequency    lactulose  10 g Oral Daily    nicotine  1 patch Transdermal Daily    nicotine   Transdermal Q8H    pantoprazole  40 mg Oral Daily    piperacillin-tazobactam  2.25 g Intravenous Q6H    sodium chloride (PF)  3 mL Intracatheter Q8H       Review of systems  A 10-point review of systems was negative.    Examination  BP (!) 141/66 (BP Location: Left arm)   Pulse 85   Temp 98.3  F (36.8  C) (Oral)   Resp 20   Ht 1.777 m (5' 9.96\")   Wt 89.5 kg (197 lb 5 oz)   SpO2 95%   BMI 28.34 kg/m      Intake/Output Summary (Last 24 hours) at 11/5/2021 0820  Last data filed at 11/5/2021 0727  Gross per 24 hour   Intake 1800 ml   Output 1875 ml   Net -75 ml       Gen- male, sitting in chair  RS- comfortable on room air  Abd- distended  Neuro- moves all extremities  Skin- jaundiced  Psych- normal mood    Laboratory  Lab Results   Component Value Date     11/05/2021     07/07/2021    POTASSIUM 3.7 11/05/2021    POTASSIUM 3.7 07/07/2021    CHLORIDE 100 11/05/2021    CHLORIDE 110 07/07/2021    CO2 18 11/04/2021    CO2 28 07/07/2021    BUN 86 11/04/2021    BUN 8 07/07/2021    CR 3.05 11/04/2021    CR 0.89 07/07/2021       Lab Results   Component Value Date    BILITOTAL 24.4 11/04/2021    BILITOTAL 3.3 07/07/2021    ALT 76 11/04/2021    ALT 59 07/07/2021    AST 74 11/04/2021     07/07/2021    ALKPHOS 88 11/04/2021    ALKPHOS 149 07/07/2021       Lab Results   Component Value Date    WBC 16.9 11/05/2021    WBC 4.4 " 07/07/2021    HGB 8.7 11/05/2021    HGB 10.6 07/07/2021    MCV 92 11/05/2021     07/07/2021    PLT 71 11/05/2021    PLT 57 07/07/2021       Lab Results   Component Value Date    INR 2.07 11/05/2021    INR 1.55 07/07/2021       MELD-Na score: 38 at 11/5/2021  4:34 AM  MELD score: 38 at 11/5/2021  4:34 AM  Calculated from:  Serum Creatinine: 3.23 mg/dL at 11/5/2021  4:34 AM  Serum Sodium: 130 mmol/L at 11/5/2021  4:34 AM  Total Bilirubin: 24.1 mg/dL at 11/5/2021  4:34 AM  INR(ratio): 2.07 at 11/5/2021  4:34 AM  Age: 61 years    Assessment  Mr. Olivas is a 61 year old man with decompensated cirrhosis due to HCV/ETOH (ascites) with ongoing alcohol use and infiltrative HCC on bevacizumab/atezolizumab and hepatology is consulted in the context of jaundice.  Found to have intra-abdominal hematoma in relation to HCC.    Mr. Olivas and his wife and son (via telephone) had a care conference today including the primary team, myself from hepatology, IR, oncology, nephrology, palliative care and  services.  Options were discussed including aggressive medical management, medical care limiting procedures, or comfort care.      At this point, he wishes for a time limited trial of medical care, and his wishes are for quality of life over quantity, they will plan for further discussions as things progress.    From a liver standpoint, things remain stable today.  We are happy to see his hemoglobin has remained stable.  We are concerned that his baseline decompensated cirrhosis, MELD (improved to 38) and status puts him at high risk for death and when adding in comorbidity such as MICHELLE, infiltrative HCC and now intra-abdominal bleeding that his prognosis is grim.    We would not recommend a paracentesis at this point given he is comfortable and decreasing the abdominal pressure could theoretically cause worsening intra-abdominal bleeding.    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  -- Mr. Olivas wishes for a time limited trial of medical interventions.  -- Family asked if he would be a liver transplant candidate and unfortunately based upon his medical status he is not a liver transplant candidate.  -- We would not recommend paracentesis at this point.  -- Transfusions as per primary service.  -- Low sodium diet (< 2g) to reduce ascites and fluid  -- Monitor weights qdaily, monitor for fluid accumulation  -- Recommend high protein calorie intake for malnutrition (1.1-1.5g/kg per day) and nutrition consultation.   -- Continue Lactulose 10g daily but titrate to 3-4 BM daily, if not improving mental status could consider trialing off of this.  -- Agree with continuing Zosyn as per primary team given unclear picture though less suspicious for SBP based on paracentesis.    Salvador Oleary MD  Gastroenterology Fellow, PGY-5    Case staffed with attending, Dr. Matt.    Hepatology will continue to follow.    ATTENDING NOTE, GASTROENTEROLOGY/HEPATOLOGY    I discussed this patient with the fellow and participated in the decision making. I agree with the fellow's note. Meli Matt MD

## 2021-11-05 NOTE — PLAN OF CARE
Neuro: A&Ox4. Follows commands. PERRLA. Slow with speech.   Cardiac: SR w/ HR 80-90. -150. DBP 60-70. VSS. Afebrile.    Respiratory: Sating >92% on 3L NC, previous night on 2L. Intermittently takes off O2 and desast to 88%. When awake, pt sats >90% on Room Air. C/o DIOP.   GI/: Adequate urine output.No BM overnight. Intermittent belching and hiccups. Abdomen tender upon palpation. Pt c/o abdominal fullness.    Diet/appetite: Regular diet; good appetite.  Activity:  SBA. Steady on feet.   Pain: PRN oxy x3, see eMAR.    Skin: No new deficits noted.  LDA's:  -2 PIV    Pt stated hiccups made the abdominal pain worse. Pt requested for medication to help rid of the hiccups. Provider notified and gave RN an OK to administer oral Protonix earlier than scheduled time.   Care conference scheduled for early afternoon.    Plan: Continue with POC. Notify primary team with changes.

## 2021-11-05 NOTE — PROGRESS NOTES
Nephrology Progress Note  November 4, 2021      Dima Olivas MRN:[id number] YOB: 1960  Date of Admission:11/2/2021  Primary care provider: Tracy Feliz  Requesting physician: Skylar Tee MD    ASSESSMENT AND RECOMMENDATIONS:   Mr. Olivas is a 60 yo M with PMH of cirrhosis, HCC now presenting with new MICHELLE.      #MICHELLE  Baseline kidney function 0.7-1. Potassium normal, patient producing urine. Able to concentrate urine. FeNa 0.2%, pattern pre-renal. BP well controlled without any hypotension documented. Hyperbilirubinemia in the setting of intra abdominal bleeding, total protein decreased. Decreased bicarbs but borderline elevated pH, most likely due to hyperammonemia. Hypoalbuminemia improved after IV albumin infusion. Creatinine today improving at 3.07. May have MICHELLE from abdominal compartment due to acute fluid collection secondary to intra-abdominal fluid due to bleeding and acute pancreatitis. Abdominal distention today improving, bleeding risk high, will hold paracentesis.  HRS is also possible but his BP is normal and he has not had ascites until now.  Bili is high enough to cause irreversible bile cast nephropathy. Care conference today with the rest of the team, treatment options reviewed with patient and family. We explained to him that his prognosis is mostly dependent on his liver function, therefore the benefit from dialysis, if ever needed, would be limited.  - continue holding diuretics  - increase potassium intake to 3 g daily.   - hold therapeutic paracentesis due to high bleeding risk  - albumin to maintain bp >70 mm Hg  - would not be a good candidate for dialysis  - Weigh daily     #Hyponatremia, euvolemic, isotonic  Normal serum osmolality, urine osm less than maximally dilute.  Currently asymptomatic. FeNa 0.2%.  Difficult to tell renal perfusion but doubt he would improve with IVF given his abdominal distention.   - CMP every 12 hours  - Strict I/Os  - [place] obtain  "daily weights  - Patient started clear liquid diet, limit daily intake to 2L.     #Alkalemia  Venous pH 7.46 today. Combined resp alkalosis and metabolic acidosis.  pCO2 27 with RR 16-22. Possibly triggered by hypoxia or ESLD.  HCO3 19, unchanged from previous measurements, would continue to monitor without intervention.   - VBG daily   - Consider getting ammonia levels    Recommendations were communicated to primary team via phone and note.      Jacoby Mendez MD  Internal Medicine Resident, PGY-1  [phone]    I have seen and examined this patient with the resident.  This note reflects our joint assessment and plan.     Sara Paula MD         REASON FOR CONSULT: MICHELLE    Overnight Interval: No acute events overnight. Did require supplemental oxygen but now breathing fine on room air. Kidney function improving. Care conference held today with patient, primary team and rest of consultants.  PHYSICAL EXAM:   Temp  Av.6  F (36.4  C)  Min: 96  F (35.6  C)  Max: 98.7  F (37.1  C)      Pulse  Av.1  Min: 75  Max: 92 Resp  Av.9  Min: 16  Max: 22  SpO2  Av.9 %  Min: 90 %  Max: 100 %       BP (!) 141/66 (BP Location: Left arm)   Pulse 85   Temp 98.3  F (36.8  C) (Oral)   Resp 20   Ht 1.777 m (5' 9.96\")   Wt 89.5 kg (197 lb 5 oz)   SpO2 95%   BMI 28.34 kg/m        Admit Weight: 88.9 kg (196 lb)       GENERAL APPEARANCE: Awake, mildly somnlent, NAD  EYES: scleral icterus, pupils equal  Endo: No goiter, No moon facies  Lymphatics: no cervical or supraclavicular LAD  Pulmonary: lungs clear to auscultation with equal breath sounds bilaterally, no clubbing  CV: regular rhythm, tacycardic, no rub   - No JVD   - CARMEN at the level of the shins.  GI: distended, improved today, mildly tender to palpation, bowel sounds decreased  MS: no evidence of inflammation in joints, no muscle tenderness  : no villanueva  SKIN: jaundiced without rash  NEURO: face symmetric, no asterixis     LABS:   CMP  Recent Labs "   Lab 11/05/21 0434 11/04/21 1959 11/04/21  0432 11/03/21 1949 11/03/21  0745 11/03/21  0745   * 130* 130* 130*  130*   < > 128*   POTASSIUM 3.7 3.9 4.0 4.4  4.4   < > 4.9   CHLORIDE 100 99 99 97  97   < > 97   CO2  --  18* 19* 18*  18*  --  17*   ANIONGAP  --  13 12 15*  15*  --  14   GLC  --  114* 97 117*  117*  --  91   BUN  --  86* 95* 93*  93*  --  89*   CR  --  3.05* 3.67* 3.58*  3.58*  --  3.77*   GFRESTIMATED  --  21* 17* 18*  18*  --  16*   HERNESTO  --  7.6* 7.7* 7.5*  7.5*  --  7.5*   MAG 3.1*  --   --  3.2*  --   --    PHOS 4.4  --   --   --   --   --    PROTTOTAL  --  6.0* 5.6* 6.1*  --  5.3*   ALBUMIN  --  3.4 2.8* 2.9*  --  2.0*   BILITOTAL  --  24.4* 23.8* 25.7*  --  24.4*   ALKPHOS  --  88 93 100  --  114   AST  --  74* 78* 83*  --  94*   ALT  --  76* 85* 100*  --  113*    < > = values in this interval not displayed.     CBC  Recent Labs   Lab 11/05/21 0434 11/04/21 1959 11/04/21  1206 11/04/21 0432 11/03/21 1949 11/03/21  1355 11/03/21  0745 11/03/21  0745   HGB 8.7* 8.2* 5.4* 7.1*   < > 6.9*   < > 6.3*   WBC 16.9*  --   --  10.8  --  12.1*  --  13.8*   RBC 2.83*  --   --  2.34*  --  2.28*  --  2.09*   HCT 25.9*  --   --  21.7*  --  21.5*  --  20.0*   MCV 92  --   --  93  --  94  --  96   MCH 30.7  --   --  30.3  --  30.3  --  30.1   MCHC 33.6  --   --  32.7  --  32.1  --  31.5   RDW 17.5*  --   --  18.1*  --  17.1*  --  17.6*   PLT 71*  --   --  77*  --  79*  --  88*    < > = values in this interval not displayed.     INR  Recent Labs   Lab 11/05/21 0434 11/04/21 0432 11/03/21  0745 11/02/21  0644   INR 2.07* 2.07* 2.21* 1.92*     ABG  Recent Labs   Lab 11/05/21 0434   O2PER 30      URINE STUDIES  Recent Labs   Lab Test 11/03/21  0454 10/22/21  1300   COLOR Dark Yellow*  --    APPEARANCE Clear  --    URINEGLC Negative  --    URINEBILI Moderate*  --    URINEKETONE Negative  --    SG 1.027  --    UBLD Trace*  --    URINEPH 5.0  --    PROTEIN 10 * Trace*   NITRITE Negative  --     LEUKEST Negative  --    RBCU 1  --    WBCU 3  --      No lab results found.  PTH  No lab results found.  IRON STUDIES  No lab results found.    IMAGING:  All imaging studies reviewed by me.

## 2021-11-05 NOTE — CONSULTS
Care Management Initial Consult    General Information  Assessment completed with: Patient,    Type of CM/SW Visit: Initial Assessment    Primary Care Provider verified and updated as needed: Yes   Readmission within the last 30 days: no previous admission in last 30 days   Reason for Consult: discharge planning  Advance Care Planning: Advance Care Planning Reviewed: no concerns identified       Communication Assessment  Patient's communication style: spoken language (English or Bilingual)    Hearing Difficulty or Deaf: no   Wear Glasses or Blind: yes    Cognitive  Cognitive/Neuro/Behavioral: .WDL except  Level of Consciousness: alert  Arousal Level: opens eyes spontaneously  Orientation: oriented x 4  Mood/Behavior: calm, cooperative  Best Language: 0 - No aphasia  Speech: slow    Living Environment:   People in home: child(lexus), adult, spouse     Current living Arrangements: house      Able to return to prior arrangements: yes    Family/Social Support:  Care provided by: self  Provides care for: no one  Marital Status:   Wife, Children (family)          Description of Support System: Supportive, Involved    Support Assessment: Adequate family and caregiver support    Current Resources:   Patient receiving home care services: No  Community Resources: None  Equipment currently used at home: none  Supplies currently used at home: None    Employment/Financial:  Employment Status: unemployed     Financial Concerns: No concerns identified     Lifestyle & Psychosocial Needs:  Social Determinants of Health     Tobacco Use: High Risk     Smoking Tobacco Use: Current Every Day Smoker     Smokeless Tobacco Use: Never Used   Alcohol Use: Unknown     Frequency of Alcohol Consumption: 4 or more times a week     Average Number of Drinks: 1 or 2     Frequency of Binge Drinking: Not on file   Financial Resource Strain:      Difficulty of Paying Living Expenses:    Food Insecurity:      Worried About Running Out of Food in  "the Last Year:      Ran Out of Food in the Last Year:    Transportation Needs:      Lack of Transportation (Medical):      Lack of Transportation (Non-Medical):    Physical Activity:      Days of Exercise per Week:      Minutes of Exercise per Session:    Stress:      Feeling of Stress :    Social Connections:      Frequency of Communication with Friends and Family:      Frequency of Social Gatherings with Friends and Family:      Attends Hoahaoism Services:      Active Member of Clubs or Organizations:      Attends Club or Organization Meetings:      Marital Status:    Intimate Partner Violence:      Fear of Current or Ex-Partner:      Emotionally Abused:      Physically Abused:      Sexually Abused:    Depression: Not at risk     PHQ-2 Score: 0   Housing Stability:      Unable to Pay for Housing in the Last Year:      Number of Places Lived in the Last Year:      Unstable Housing in the Last Year:      Functional Status:  Prior to admission patient needed assistance:   Dependent ADLs:: Independent  Dependent IADLs:: Independent  Assesssment of Functional Status: Not at  functional baseline    Mental Health Status:  Mental Health Status: No Current Concerns       Chemical Dependency Status:  Chemical Dependency Status: No Current Concerns        Values/Beliefs:  Spiritual, Cultural Beliefs, Hoahaoism Practices, Values that affect care: yes           Additional Information:  Per H&P, \"Dima Olivas is a 61 year old male admitted on 11/2/2021. 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    SW met with pt in room to introduce self, role, and to complete assessment. Pt confirmed that he resides in [place], [place] with his spouse and two adult " sons (ages 22 and 24). Pt resides in a house and shared that the home has three levels (upper, main, and basement). Pt's bedroom is located upstairs and there are three steps to get into the home. Pt confirmed his PCP and shared that PTA, pt was independent with all ADLs/ IADL's. Pt denied any mental health and/or chep dep concerns.     SW was able to answer questions pt and spouse had related to hospice and cremation services. Pt provided medicare list of hospice agencies and shared that if home hospice is the plan, family will need to identify an agency and SW will make referral. SW shared that care management will continue to follow for dispo needs and support as appropriate.     BECK Anaya, LGSW  6B   [facility]  Phone: [phone]  Pager: [phone]

## 2021-11-05 NOTE — PROGRESS NOTES
[facility]    Progress Note - Haily 1 Service        Date of Admission:  11/2/2021    Assessment & Plan             Dima Olivas is a 61 year old male admitted on 11/2/2021. 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     Today:  -Care conference today. Continued conservative measures for now to monitor for improvement. Not yet ready to move to comfort cares. Per discussion with palliative care, will do trial of conservative medical management through the weekend.  - Increased oxycodone to 5-10 mg q4hr PRN  - Started baclofen for hiccups, spasms      #Cirhossis 2/2 EtOH + HCV  #Acute decompensation of cirrhosis   #Ascites  #Portal hypertensive gastropathy   #Coagulopathy   MELD-Na score: 38 at 11/5/2021  4:34 AM  MELD score: 38 at 11/5/2021  4:34 AM  Calculated from:  Serum Creatinine: 3.23 mg/dL at 11/5/2021  4:34 AM  Serum Sodium: 130 mmol/L at 11/5/2021  4:34 AM  Total Bilirubin: 24.1 mg/dL at 11/5/2021  4:34 AM  INR(ratio): 2.07 at 11/5/2021  4:34 AM  Age: 61 years    Presenting with acute abdominal pain, new ascites (reported first episode). On spironolactone 50, lasix 20 at home. AOx3. Decompensation may be 2/2 to continued alcohol use vs progression of HCC. INR 1.92, likely 2/2 acute synthetic dysfunction. CT Chest/Abd/Pelv significant for blood in peritoneal cavity 2/2 to hepatic hemorrhage from HCC lesion. Likely acute and causing this acute decompensation. Denies melena, or hematochezia.  EGD 9/24/2021 w/o evidence of varices, but notable for portable hypertensive gastropathy. Concern for SBP given acute presentation with large ascites, per 250/PMN unlikely SBP Paracentesis  significant for yessy, bloody output 500mL.   Pt and wife informed of brevity of the situation, and are open to palliative discussion for goals of care.     -s/p paracentesis    >unlikely SBP based on correction, culture NGTD   >cytology added to paracentesis sample   -S/p Albumin challenge x3 days  -Vit K challenge, 5 mg SQ or PO x3  -IV Zosyn q6 (renal adjustment)   -Dilaudid 0.3mg q2hr PRN   -Oxycodone 5mg Q4H PRN  -Lactulose TID   -HOLD spironolactone   -HOLD lasix  -PTA PPI   -Daily MELD labs   -Hgb q8 hrs   -Hepatology following, appreciate recs  -Palliative consult, appreciate recs   -Care conference on 11/5. Continued conservative measures for now to monitor for improvement. Not yet ready to move to comfort cares. Per discussion with palliative care, will do trial of conservative medical management through the weekend.  - Increased oxycodone to 10 mg q4hr PRN  - Started baclofen for hiccups, spasms      #Acute Blood Loss Anemia   #Normocytic anemia   #Hepatic hemorrhage  New 3gm Hgb drop 10/22 -> now. 11.7 --> 8.1.Denies symptoms of palpitations, lightheadedness, pre-syncope, or dizziness. Vitals stable, no hypotension, tachycardia, or dyspnea. CT significant for 3.8 x 6.8 x 7.3 cm hematoma in the anterior abdomenextending inferiorly from large mass in segment 2/3 of the liver.  Noactive extravasation is demonstrated on CT scan. Likely 2/2 hemorrhagic HCC lesion. Paracentesis frankly bloody. IR following - if continued decrease in Hgb can consider embolization with risk of worsening liver function. Repeat CT ON 11/3/2021 illustrating increasing density of abdominal ascites/fluid concerning for continued bleeding into peritoneal cavity. S/p 4uPRB, 2u transfused 11/4 for drop 7.0 --> 5.4.   Paged IR regarding acute hemoglobin drop, again reported could proceed with emobolization, however discussed with pt risks vs benefit and agreed he would like to wait until care conference before pursuing and opting  "resuscitation with pRBC PRN.      -Hgb q8hrs   -Transfusion <7   >Pt consented  -Stat CT if pt illustrates acute Hgb drop concern of bleeding.  -AM CBC       #MICHELLE   New MICHELLE, baseline ~0.92. Elevated acutely 2.82 in the setting of new ascites. Reported poor urine output even with continued use of lasix/spirinolactone. No dysuria, no hematuria. RUQ US does not illustrate any hydronephrosis. Intravascular hypovolemia in the setting of third spacing possibility. Must consider that pt may be developing Type 1, and less likely Type 2, HRS given the acute decompensation cirrhosis. Additionally, acute elevation in bili may be causing acute hyperbili nephrotoxicity. Reports some improvement of urine output following albumin, but Cr worsening now 3.67 concern for progression.     -S/p 100g 11/4/2021  -BID CMP   >K >5.5 shift with bicarb   -2g K+ diet  -2L fluid restriction   -Mg  -Phos   -AM VBG  -Bladder pressure x1  -Strict I/Os  -Daily weights   -Renal consulted, appreciate recs    #Acute Pancreatitis   #BISAP 1   Arrived to ED with complaints of lower abdominal pain in the setting of acute cirrhosis decompensation. Lipase elevated 6k. First noted abdominal pain days following 1st round of chemotherapy atezolizumab/nevacozimab, noted that he noted feeling \"worse\" following chemotherapy. Most likely 2/2 acute alcohol use. History of stones but no CBD dilatation. Non-specific history of possible biliary colic. No history of abdominal trauma. No new medications.   - Advance to 2 gm K diet, 2L fluid restriction  - Goal of 0.5-1cc/kg/hour urine output  - Dilaudid 0.3mg q2hr PRN    -Avoid medications associated with pancreatitis (most commonly, loop diuretics, certain antibiotics, mesalamines, immunomodulators, valproic acid).      #Hyponatremia   New hypoNa 128, decreased from 10/22 at 140. Reports few episode of emesis. Approximately 10 day history of generalized anorexia, with intermittent non-blood emesis. Poor PO intake. In " the setting of ascites, and volume overload per physical exam (JVD, mild crackles in lung bases bilaterally) and MICHELLE. Concern for intravascular hypovolemia hypoNa vs hypervolemic hypoNa. Must consider that pt may be developing Type 1, and less likely Type 2, HRS given the acute decompensation cirrhosis. Urine Na 7, Urine osms 370. With fluid restriction, and interventions increased to 130.    -AM CMP   -Strict I/Os   -Daily weights   -Renal consulted, appreciate recs.     #HCC   HCC 2/2 untreated HCV, presumably from history of IV drug use per patient. S/p 1 dose chemotherapy with Bevacizumab and Tecentriq on 10/22/2021.   -Oncology following, appreciate recs       #Transaminitis   At time of infusion 10/22, transaminits 86/182 ALT > AST. CMP 10/13 , ALT 64. Chronic AST elevation.  New significant ALT elevation 184, . In the setting of diminished liver parenchyma given cirrhosis, still may be acute alcoholic pancreatitis given the especially elevated bili.   -AM CMP     #Acute on chronic jaundice  New worsened hyperbilirubinemia. ~2 1mo-2wks ago. RUQ US negative for acute obstruction with stone, or dilation of biliary tree. Discussion with panc/bili GI, unlikely obstructive cause of jaundice without observed dilation of biliary tree. Still actively using alcohol, approximately 5-6 drinks per week decreased 1 drink since symptoms started 1 weeks ago. Jaundice through abdomen, acute pruritis.  Given alcohol use history, may be from alcoholic hepatitis.     #Polysubstance Use   #AUD  #Nicotine Use   Long standing alcohol use, and nicotine use. Recently cut down from 5-6 drinks per night to 1 during acute illness past 10 days. Has never tried to quit in the past, is now interested. Has cut down from 1ppd to 1/3ppd in regards to nicotine. Open to speaking to chem dep.Last drink of alcohol approximately 24 hours ago.     -Nicotine patch in place.   -WA protocol     #Acute Deconditioning  #Malnutrition    Acute deconditioning and malnutrition in setting of catabolic state of cirrhosis. Presently NPO for acute pancreatitis, but plan for high protein diet when he can tolerate.     -IP nutrition consult, appreciate recs  -PT consult, appreciate recs        Diet: Fluid restriction 2000 ML FLUID  Combination Diet 2 gm K Diet  Snacks/Supplements Adult: Other; Vital Cuisine, BID; Between Meals    DVT Prophylaxis: Pneumatic Compression Devices  Yanes Catheter: Not present  Fluids: None   Central Lines: None  Code Status: No CPR- Do NOT Intubate      Disposition Plan   Expected discharge: 11/10/2021   recommended to prior living arrangement once imrpovement in liver function.     The patient's care was discussed with the Attending Physician, Dr. Tee.    Jose Luis Shaffer MD  [address]  Securely message with the Vocera Web Console (learn more here)  Text page via [employer] Paging/Directory    Please see sign in/sign out for up to date coverage information       ______________________________________________________________________    Interval History     Continues to report abdominal distension and pain, although he believes the oxycodone has been helpful. Denies shortness of breath. He understands the gravity of the situation and was interesting in hearing more at the care conference today.     Data reviewed today: I reviewed all medications, new labs and imaging results over the last 24 hours. I personally reviewed no images or EKG's today.    Physical Exam   Vital Signs: Temp: 98.1  F (36.7  C) Temp src: Oral BP: (!) 148/67 Pulse: 86   Resp: 20 SpO2: 96 % O2 Device: None (Room air) Oxygen Delivery: 3 LPM  Weight: 197 lbs 4.99 oz    Physical Exam  Constitutional:  He is not in acute distress, +jaundice.  HENT: Normocephalic and atraumatic, +scleral icterus  Cardiovascular: RRR, +murmur  Pulmonary: normal respiratory effort, +rales  Abdominal: +abdominal  distension and hepatomegaly  Extremities: bilateral lower extremity edema  Skin: warm, jaundice  Neurological: alert, moving all 4 extremities spontaneously    Data   Recent Labs   Lab 11/05/21  1137 11/05/21  0434 11/04/21 1959 11/04/21  1206 11/04/21  0432 11/03/21  1949 11/03/21  1355 11/03/21  0745 11/03/21  0745 11/02/21  1747 11/02/21  0644   WBC  --  16.9*  --   --  10.8  --  12.1*   < > 13.8*   < > 13.4*   HGB 9.1* 8.7* 8.2*   < > 7.1*   < > 6.9*   < > 6.3*   < > 8.1*   MCV  --  92  --   --  93  --  94   < > 96   < > 97   PLT  --  71*  --   --  77*  --  79*   < > 88*   < > 135*   INR  --  2.07*  --   --  2.07*  --   --   --  2.21*  --  1.92*   NA  --  130* 130*  --  130*   < >  --   --  128*  --  128*   POTASSIUM  --  3.7 3.9  --  4.0   < >  --   --  4.9  --  4.2   CHLORIDE  --  100 99  --  99   < >  --   --  97  --  95   CO2  --  17* 18*  --  19*   < >  --   --  17*  --  23   BUN  --  84* 86*  --  95*   < >  --   --  89*  --  75*   CR  --  3.23* 3.05*  --  3.67*   < >  --   --  3.77*  --  2.82*   ANIONGAP  --  13 13  --  12   < >  --   --  14  --  10   HERNESTO  --  7.8* 7.6*  --  7.7*   < >  --   --  7.5*  --  7.6*   GLC  --  116* 114*  --  97   < >  --   --  91  --  132*   ALBUMIN  --  3.0* 3.4  --  2.8*   < >  --   --  2.0*  --  1.5*   PROTTOTAL  --  5.8* 6.0*  --  5.6*   < >  --   --  5.3*  --  6.1*   BILITOTAL  --  24.1* 24.4*  --  23.8*   < >  --   --  24.4*  --  25.3*   ALKPHOS  --  98 88  --  93   < >  --   --  114  --  183*   ALT  --  75* 76*  --  85*   < >  --   --  113*  --  182*   AST  --  58* 74*  --  78*   < >  --   --  94*  --  174*   LIPASE  --   --   --   --   --   --   --   --   --   --  6,327*    < > = values in this interval not displayed.     No results found for this or any previous visit (from the past 24 hour(s)).

## 2021-11-05 NOTE — PROGRESS NOTES
[facility]  Palliative Care Daily Progress Note       Recommendations & Counseling       Care conference this afternoon to discuss the options. On further discussion with Jigar and his wife, they would like to do a time-limited trial of conservative medical management through the weekend.     If there is no major change, they would like to go home and focus on comfort. We introduced hospice and they are interested in this.    We placed a social work consult to initiate conversations about hospice anticipating discharge early next week.    His pain is better controlled with 10 mg oxycodone, but he worries about sedation. Recommend giving a range of 5-10 mg oxycodone q4h prn so that he can adjust the dose (ordered for you).    Agree with baclofen prn for hiccups.    Palliative care will continue to follow, but may not see this patient over the weekend unless there are new questions or concerns. Please reach out to the team if needed.       Thank you for the opportunity to continue to participate in the care of this patient and family.  Please feel free to contact on-call palliative provider with any emergent needs.  We can be reached via team pager [phone] (answered 8-4:30 Monday-Friday); after-hours answering service ([phone]);     This patient's care was discussed with palliative care staff, Dr Noriega.    Elise Don MD  Internal Medicine, PGY-3       Assessments          Dima Olivas is a 61 year old male with a past medical history significant for cirrhosis 2/2 alcohol use and untreated Hep C and HCC s/p 1st round of chemotherapy Bevacizumab and Tecentriq on 10/22/2021. He was admitted for acute cirrhosis decompensation in the setting of hepatic hemorrhage likely 2/2 HCC lesion presenting with significant hyperbili, MICHELLE, ABLA, and pancreatitis. PC was consulted for assistance with GOC.     Today, the patient was seen for:  HCC  Decompensated  cirrhosis  Abdominal pain  Ascites  Pancreatitis  Goals of care  Patient and family support    Prognosis, Goals, or Advance Care Planning was addressed today with: Yes.   Mood, coping, and/or meaning in the context of serious illness were addressed today: Yes.    Care conference this afternoon:  Meeting held with the primary medicine team, hepatology, nephrology, oncology, IR, and palliative care. The patient and his wife were there in person and their oldest son, Abbe, was on the phone.    The meeting started by having the patient's wife share their current understanding of Jigar's situation. The medical team then provided updates. The hepatology team reported that the degree of liver injury that Jigar gives him a very high risk of mortality in the fairly short term. With his comorbidities liver transplant is not on the table and there unfortunately are not other options for treatment of his cirrhosis. Regarding his HCC, no treatment can continue while he is bleeding and has this degree of cirrhosis decompensation. The suspicion from all the teams is that the fluid in his abdomen is causing some degree of tamponade and decreasing the bleed. Per IR, attempted embolization would likely cause so much additional liver damage that it would shorten his life. The family asked a lot of questions and then took some time to talk amongst themselves.    We followed up with Jigar and his wife later in the afternoon. They feel that a time-limited trial of conservative management and continued monitoring would be good, but that if things don't change they would like to try and take Jigar home to spend his time with family and keep him comfortable. We introduced hospice and they are interested in hearing more about this to start planning. Discussed that no changes would be made to the medical plan over the weekend, but that if there isn't significant change we will likely plan on discharge to home with hospice next week.             Interval History:     Chart review/discussion with unit or clinical team members:   No acute events.    Per patient or family/caregivers today:  Patient reports that his pain is never completely gone, but that the oxycodone makes it very manageable. Increasing the frequency definitely helped.     Key Palliative Symptoms:   Pain location: abdomen  # Pain severity the last 12 hours: moderate  # Dyspnea severity the last 12 hours: none  # Nausea severity the last 12 hours: low  # Anxiety severity the last 12 hours: not assessed    Patient is on opioids: assessed and bowels ok/no needed changes to plan of care today. (Pt taking lactulose for HE)           Review of Systems:     Besides above, a complete 10+ ROS was reviewed and is unremarkable.          Medications:     I have reviewed this patient's medication profile and medications during this hospitalization.           Physical Exam:   Temp: 98.3  F (36.8  C) Temp src: Oral BP: (!) 141/66 Pulse: 85   Resp: 20 SpO2: 95 % O2 Device: None (Room air) Oxygen Delivery: 3 LPM  Gen: Sitting in chair at bedside  Eyes: Icteric sclera  Resp: Breathing non-labored on room air  Abd: Markedly distended, tender in lower quadrants  Msk: No gross deformity  Skin:  Diffuse jaundice  Neuro: A&O x 3, mentation slightly slowed  Mental status/Psych: Calm, does not appear anxious or depressed             Data Reviewed:     Reviewed recent labs and pertinent imaging  MELD-Na score: 38 at 11/5/2021  4:34 AM  MELD score: 38 at 11/5/2021  4:34 AM  Calculated from:  Serum Creatinine: 3.23 mg/dL at 11/5/2021  4:34 AM  Serum Sodium: 130 mmol/L at 11/5/2021  4:34 AM  Total Bilirubin: 24.1 mg/dL at 11/5/2021  4:34 AM  INR(ratio): 2.07 at 11/5/2021  4:34 AM  Age: 61 years

## 2021-11-05 NOTE — PROGRESS NOTES
Hematology / Oncology  Daily Progress Note   Date of Service: 11/05/2021  Patient: Dima Olivas  MRN: [id number]  Admission Date: 11/2/2021  Hospital Day # 3  Cancer Diagnosis: HCC   Primary Outpatient Oncologist: Dr Germain   Current Treatment Plan: Atezolizumab/Bevacizumab (C1D1 = 10/22/21)    Assessment & Plan:   Dima Olivas is a 61 year old male admitted on 11/2/2021. He has a history of cirrhosis 2/2 alcohol use and untreated Hep C and HCC initiated on Bevacizumab/Tecentriq cycle 1=10/22/2021. Now admitted for acute cirrhosis decompensation in the setting of hepatic hemorrhage likely 2/2 HCC lesion presenting with significant hyperbili, MICHELLE and pancreatitis. CT CAP on admission with large abdominal hematoma and large volume ascites.     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 We discussed with the patient and primary team that bevacizumab increases risk for bleeding and clotting. CT CAP on admission with large abdominal hematoma and new large volume ascites. Bleed likely 2/2 tumore response from bevacizumab vs hyper progression. S/p paracentesis with 500 ml off (11/2), awaiting cultures but bloody appearance and primarily RBCs so not likely SBP but treated with empiric antibiotics per primary team. IR consulted for consideration of hepatic tumor embolization, no intervention at this time given too high risk to perform embolization but may consider if there is evidence of active bleeding on CTA.     We appreciate input from all consultants in this complex case including IR, GI, nephro and palliative care. Overall, we suspect obstruction 2/2 increased intraabdominal pressure with large hematoma 2/2 hemorrhagic liver lesion driving other acute  "issues including acute anemia, MICHELLE and transaminitis. However, it is important to also consider possible immunotherapy related inflammatory process warranting HD steroids if clinical picture does not improve with current supportive cares. Care conference held with all teams involved and patient with his wife and son on 11/5. Discussed poor prognosis in terms of minimal liver function (MELD 38) which prohibits further cancer directed therapies and places him at high risk of death with other interventions including embolization and paracentesis.     Recommendations:   - Appreciate all teams involved in care conference today. Continue best supportive cares and ongoing goals of care discussions. No medical oncology interventions recommended. If liver function does not improve he would not be a candidate for any further chemotherapy.        Patient was seen and plan of care was discussed with attending physician Dr. Yo.    Thank you for the opportunity to partake in this patients plan of care. Please do not hesitate to page with questions. We will continue to follow peripherally.     Liezt Major ([facility], [place], [place]    Hematology/Oncology   Pager: [phone]   ___________________________________________________________________    Subjective & Interval History:    Afebrile and HD stable. Received 2u pRBCs again yesterday hemoglobin improved 5.4 -> 9.1. Ongoing abdominal pain, rated 7/10 today. Temporary relief with PRN Oxy and Dilaudid. No nausea. Hiccups are bothersome that started yesterday evening and ongoing despite protonix this morning. Scared about current situation and possibility of acute decompensation and big picture plan. Discussed code status with his wife and family yesterday. No blood stools >24 hours. Good UOP.     Physical Exam:    Blood pressure (!) 148/67, pulse 86, temperature 98.1  F (36.7  C), temperature source Oral, resp. rate 20, height 1.777 m (5' 9.96\"), weight 89.5 kg (197 lb 5 oz), " SpO2 96 %.    General: sitting up in chair, chronically ill appearing, significant jaundice   HEENT: sclera icteric  Neck: supple, normal ROM  CV: RRR, normal S1/S2, no m/r/g    Resp: CTAB, no wheezing/crackles, normal respiratory effort on ambient air, winded with talking   GI: distended, non tender, epigastric palpable mass, bowel sounds present and normoactive  MSK: warm and well-perfused, normal tone, trace LE edema   Skin: no rashes on limited exam   Neuro: Alert and interactive but fatigued, moves all extremities equally, no focal deficits    Labs & Studies: I personally reviewed the following studies:  ROUTINE LABS (Last four results):  CMP  Recent Labs   Lab 11/05/21 0434 11/04/21 1959 11/04/21 0432 11/03/21 1949   * 130* 130* 130*  130*   POTASSIUM 3.7 3.9 4.0 4.4  4.4   CHLORIDE 100 99 99 97  97   CO2 17* 18* 19* 18*  18*   ANIONGAP 13 13 12 15*  15*   * 114* 97 117*  117*   BUN 84* 86* 95* 93*  93*   CR 3.23* 3.05* 3.67* 3.58*  3.58*   GFRESTIMATED 20* 21* 17* 18*  18*   HERNESTO 7.8* 7.6* 7.7* 7.5*  7.5*   MAG 3.1*  --   --  3.2*   PHOS 4.4  --   --   --    PROTTOTAL 5.8* 6.0* 5.6* 6.1*   ALBUMIN 3.0* 3.4 2.8* 2.9*   BILITOTAL 24.1* 24.4* 23.8* 25.7*   ALKPHOS 98 88 93 100   AST 58* 74* 78* 83*   ALT 75* 76* 85* 100*     CBC  Recent Labs   Lab 11/05/21  1137 11/05/21 0434 11/04/21 1959 11/04/21  1206 11/04/21 0432 11/04/21 0432 11/03/21 1949 11/03/21  1355 11/03/21  0745 11/03/21  0745   WBC  --  16.9*  --   --   --  10.8  --  12.1*  --  13.8*   RBC  --  2.83*  --   --   --  2.34*  --  2.28*  --  2.09*   HGB 9.1* 8.7* 8.2* 5.4*   < > 7.1*   < > 6.9*   < > 6.3*   HCT  --  25.9*  --   --   --  21.7*  --  21.5*  --  20.0*   MCV  --  92  --   --   --  93  --  94  --  96   MCH  --  30.7  --   --   --  30.3  --  30.3  --  30.1   MCHC  --  33.6  --   --   --  32.7  --  32.1  --  31.5   RDW  --  17.5*  --   --   --  18.1*  --  17.1*  --  17.6*   PLT  --  71*  --   --   --  77*  --  79*   --  88*    < > = values in this interval not displayed.     INR  Recent Labs   Lab 11/05/21  0434 11/04/21  0432 11/03/21  0745 11/02/21  0644   INR 2.07* 2.07* 2.21* 1.92*       Medications list for reference:  Current Facility-Administered Medications   Medication     0.9% sodium chloride BOLUS     baclofen (LIORESAL) half-tab 5 mg     HYDROmorphone (PF) (DILAUDID) injection 0.3 mg     lactulose (CHRONULAC) solution 10 g     lidocaine (LMX4) cream     lidocaine 1 % 0.1-1 mL     melatonin tablet 3 mg     naloxone (NARCAN) injection 0.2 mg    Or     naloxone (NARCAN) injection 0.4 mg    Or     naloxone (NARCAN) injection 0.2 mg    Or     naloxone (NARCAN) injection 0.4 mg     nicotine (NICODERM CQ) 14 MG/24HR 24 hr patch 1 patch     nicotine Patch in Place     oxyCODONE IR (ROXICODONE) tablet 10 mg     pantoprazole (PROTONIX) EC tablet 40 mg     piperacillin-tazobactam (ZOSYN) 2.25 g vial to attach to  ml bag     sodium chloride (PF) 0.9% PF flush 3 mL     sodium chloride (PF) 0.9% PF flush 3 mL

## 2021-11-05 NOTE — PLAN OF CARE
"BP (!) 144/62 (BP Location: Left arm)   Pulse 86   Temp 97.9  F (36.6  C) (Oral)   Resp 18   Ht 1.777 m (5' 9.96\")   Wt 89.5 kg (197 lb 5 oz)   SpO2 94%   BMI 28.34 kg/m      Hours of Care: 2076-0827    Reason for admission: Admitted on 11/2 for acute decompensated cirrhosis, hepatic hemorrhage, c/b increased bilirubin, MICHELLE, ABLA, pancreatitis.  Hx of EtoH, cirrhosis, hepatitis C, hepatic CA.  Vitals: Hypertensive at times, otherwise VSS.   Activity: Up SBA.  Pain: Controlled with PO oxycodone.  Neuro: AO x 4.  Cardiac: SR.    Respiratory: WDL on RA.  GI/: Voiding spontaneously.  Last BM was 11/4.  Rounded, firm abdomen.  Diet: 2g Na, 2L FR.    Lines: PIV x 2 SL.  Skin/Wounds: WDL.   Plan: Pain control, trend Hgb.  Hopefully home on hospice.      Continue to monitor and follow POC    Jordan Calles RN on 11/5/2021 at 6:07 PM         "

## 2021-11-05 NOTE — PROVIDER NOTIFICATION
"Text paged covering overnight team: \"(FYI) Pt c/o hiccups making him feel uncomfortable with abdominal pain. Pt gets hiccups when changing positions. Pt requesting something for the hiccups.\"   Response: Provider OKd to give protonix early  "

## 2021-11-05 NOTE — PROGRESS NOTES
CLINICAL NUTRITION SERVICES - BRIEF NOTE     Nutrition Prescription    Recommendations already ordered by Registered Dietitian (RD):  Send Vital Cuisine supplement, BID [520 kcals, 22 gm pro, 280 mg k+ per 250 mL carton0     Future/Additional Recommendations:  Monitor for GOC discussion.   Consider calorie counts in future if appropriate.   Monitor acceptance of ONS.      NEW FINDINGS   Diet advanced since last assess; now on 2 gm K+ diet with 2 L fluid restriction.   Care conference today to discuss patient GOC. Will monitor results of discussion to help determine appropriate nutrition POC going forward.     Adding Vital Cuisine ONS to be sent BID to optimize oral nutrition intake. Consider calorie counts if indicated and pending GOC discussion.     INTERVENTIONS  Implementation  Medical food supplement therapy - Vital Cuisine, BID     Monitoring/Evaluation  Will continue to monitor and evaluate per protocol.    Piter Noriega RDN, LD, CNSC  6B RD pager [phone]

## 2021-11-06 NOTE — CODE/RAPID RESPONSE
"Rapid Response Team Note    Assessment   In assessment a rapid response was called on Dima Olivas due to SIRS/Sepsis trigger and lactic acidosis. This presentation is likely due to liver disease and possible infection.    Plan   -albumin 25%, 12.5g (50 mL)  -recheck LA in AM    -  The [facility] primary team was notified of event by RN  -  Disposition: The patient will remain on the current unit. We will continue to monitor this patient closely.  -  Reassessment and plan follow-up will be performed by the primary team    MARIBEL Griffin [facility] RRT AMCOM Job Code Contact #[phone]    Hospital Course   Brief Summary of events leading to rapid response:   \"Dima Olivas is a 61 year old male history of HCV/EtOH cirrhosis diagnosed 2020 complicated by HCC with tumor thrombus in the portal vein and extensive infiltrate in the left lobe (started on a atezolizumab/bevacizumab on 10/22) who presented with worsening abdominal pain distention found to have worsening ascites, elevated LFTs and Lipase 6000\"     LA sepsis protocol triggered by tachypnea and leukocytosis. Resulting LA draw 2.6 up from 1.8 on     Admission Diagnosis:   Other ascites [R18.8]  Acute kidney injury (H) [N17.9]  Acute liver failure without hepatic coma [K72.00]  Acute biliary pancreatitis, unspecified complication status [K85.10]    Physical Exam   Temp: 98  F (36.7  C) Temp  Min: 97.5  F (36.4  C)  Max: 98.2  F (36.8  C)  Resp: 18 Resp  Min: 16  Max: 22  SpO2: 93 % SpO2  Min: 91 %  Max: 95 %  Pulse: 87 Pulse  Min: 81  Max: 88    No data recorded  BP: (!) 149/69 Systolic (24hrs), Av , Min:132 , Max:149   Diastolic (24hrs), Av, Min:67, Max:79     I/Os: I/O last 3 completed shifts:  In: 1040 [P.O.:1040]  Out: 5 [Urine:187]     Exam:   General: chronically ill appearing. No acute distress  Mental Status: baseline mental status.  Resp: LS clear upper lobes, decreased bases  CV: systolic murmur loudest aortic area. " "S1, s2, RRR  ABD: BS hypoactive. Tenderness to palpation. Distension     Significant Results and Procedures   Lactic Acid:   Recent Labs   Lab Test 11/03/21  1950 11/02/21  1747 11/02/21  1607   LACT 3.0* 2.6* 2.1*     CBC:   Recent Labs   Lab Test 11/03/21  1949 11/03/21  1355 11/03/21  0745 11/02/21  2211 11/02/21  1836   WBC  --  12.1* 13.8*  --  13.4*   HGB 7.4* 6.9* 6.3*   < > 7.4*   HCT  --  21.5* 20.0*  --  23.3*   PLT  --  79* 88*  --  113*    < > = values in this interval not displayed.      Sepsis Evaluation   The patient is suspected to have an infection.  Dima Olivas meets SIRS criteria AND has a lactate >2 or other evidence of acute organ damage.  These vital signs, lab and physical exam findings constitute a diagnosis of SEVERE SEPSIS.    Sepsis Time-Zero (time severe sepsis diagnosis confirmed):   11/02/21 as this was the time when Lactate resulted, and the level was > 2.0     Anti-infectives (From now, onward)    Start     Dose/Rate Route Frequency Ordered Stop    11/03/21 0000  piperacillin-tazobactam (ZOSYN) 2.25 g vial to attach to  ml bag     Note to Pharmacy: For [facility], [facility] and [facility]: For Zosyn-naive patients, use the \"Zosyn initial dose + extended infusion\" order panel.    2.25 g  over 30 Minutes Intravenous EVERY 6 HOURS 11/02/21 1750          Current antibiotic coverage is appropriate for source of infection.    3 Hour Severe Sepsis Bundle Completion:  1. Initial Lactic Acid result shown above. Repeat lactic acid is not indicated.   2. Blood Cultures before Antibiotics: Yes  3. Broad Spectrum Antibiotics Administered: yes  4. Fluids: Fluid bolus not indicated due to: ESLD  "

## 2021-11-06 NOTE — PLAN OF CARE
Neuro: A&Ox3-4, confused on the date.   Cardiac: SR 80s-90s. VSS.   Respiratory: Sating 90-94% on RA.  GI/: Adequate urine output. No BM this shift.  Diet/appetite: Tolerating 2g NA 2L FR diet. Good appetite.  Activity:  SBA, up to chair and in room.  Pain: At acceptable level on current regimen. PRN oxycodone given x1.    Skin: No new deficits noted.  LDA's: 2 PIV, 1 saline locked, 1 TKO & abx.    Plan: Continue with POC. Notify primary team with changes.

## 2021-11-06 NOTE — PROGRESS NOTES
"Hematology&Oncology Progress Note:  Dima ANVAS Skaar    Date: 11/06/2021  Admission Date: 11/2/2021  Primary Heme/Oncologist:    INTERVAL HISTORY:   No events overnight.  Patient feels better this morning.  He and his wife noticed that his abdomen is a little bit smaller.  No nausea or vomiting.  Last bowel movement about 2 days ago.  No urinary symptoms.  No chest pain or shortness of breath.  No fever or chills.    ROS: Negative other than as stated in above interval history.      PHYSICAL EXAM:  BP (!) 143/74 (BP Location: Left arm)   Pulse 85   Temp 98.2  F (36.8  C) (Oral)   Resp 16   Ht 1.777 m (5' 9.96\")   Wt 90.9 kg (200 lb 6.4 oz)   SpO2 91%   BMI 28.79 kg/m    Wt Readings from Last 3 Encounters:   11/06/21 90.9 kg (200 lb 6.4 oz)   10/22/21 83 kg (183 lb)   09/24/21 81.6 kg (180 lb)     General:  no acute distress.  Jaundice  Heme/Lymph: No overt bleeding. No cervical or clavicular adenopathy.  HEENT: NCAT. PERRL, EOMI, anicteric sclera. Oral mucosa pink and moist with no lesions or thrush.  Neck: supple. No JVD.  Lungs clear to auscultation bilaterally.  CVS: RRR. No murmur or rub.   Abd: Distended, mild to moderate tenderness   skin: No rash.  Jaundice  EXTs: Moderate to severe pitting edema   neurologic: A&O x 3, CNs 2-12 grossly intact, speech normal, gait normal, sensation to light touch grossly WNL. Grossly non-focal. Strength WNL          Current Facility-Administered Medications   Medication     0.9% sodium chloride BOLUS     baclofen (LIORESAL) half-tab 5 mg     HYDROmorphone (PF) (DILAUDID) injection 0.3 mg     lactulose (CHRONULAC) solution 10 g     lidocaine (LMX4) cream     lidocaine 1 % 0.1-1 mL     melatonin tablet 3 mg     naloxone (NARCAN) injection 0.2 mg    Or     naloxone (NARCAN) injection 0.4 mg    Or     naloxone (NARCAN) injection 0.2 mg    Or     naloxone (NARCAN) injection 0.4 mg     nicotine (NICODERM CQ) 14 MG/24HR 24 hr patch 1 patch     nicotine Patch in Place     " oxyCODONE (ROXICODONE) tablet 5-10 mg     pantoprazole (PROTONIX) EC tablet 40 mg     piperacillin-tazobactam (ZOSYN) 2.25 g vial to attach to  ml bag     sennosides (SENOKOT) tablet 8.6 mg     sodium chloride (PF) 0.9% PF flush 3 mL     sodium chloride (PF) 0.9% PF flush 3 mL       LABS:  CBC  Recent Labs   Lab 11/06/21 0434 11/05/21 1956 11/05/21  1137 11/05/21 0434 11/04/21  1206 11/04/21 0432 11/03/21 1949 11/03/21  1355   WBC 14.1*  --   --  16.9*  --  10.8  --  12.1*   RBC 2.89*  --   --  2.83*  --  2.34*  --  2.28*   HGB 8.8*  8.8* 9.2* 9.1* 8.7*   < > 7.1*   < > 6.9*   HCT 26.9*  --   --  25.9*  --  21.7*  --  21.5*   MCV 93  --   --  92  --  93  --  94   MCH 30.4  --   --  30.7  --  30.3  --  30.3   MCHC 32.7  --   --  33.6  --  32.7  --  32.1   RDW 17.6*  --   --  17.5*  --  18.1*  --  17.1*   PLT 63*  --   --  71*  --  77*  --  79*    < > = values in this interval not displayed.     CMP  Recent Labs   Lab 11/06/21 0434 11/05/21 1956 11/05/21 0434 11/04/21 1959 11/04/21 0432 11/03/21 1949   * 130* 130* 130*   < > 130*  130*   POTASSIUM 3.4 3.4 3.7 3.9   < > 4.4  4.4   CHLORIDE 100 99 100 99   < > 97  97   CO2 20 18* 17* 18*   < > 18*  18*   ANIONGAP 12 13 13 13   < > 15*  15*   * 154* 116* 114*   < > 117*  117*   BUN 84* 85* 84* 86*   < > 93*  93*   CR 2.83* 2.92* 3.23* 3.05*   < > 3.58*  3.58*   GFRESTIMATED 23* 22* 20* 21*   < > 18*  18*   HERNESTO 8.1* 8.1* 7.8* 7.6*   < > 7.5*  7.5*   MAG  --   --  3.1*  --   --  3.2*   PHOS  --   --  4.4  --   --   --    PROTTOTAL 5.8* 6.0* 5.8* 6.0*   < > 6.1*   ALBUMIN 2.7* 3.0* 3.0* 3.4   < > 2.9*   BILITOTAL 25.6* 26.1* 24.1* 24.4*   < > 25.7*   ALKPHOS 110 112 98 88   < > 100   AST 69* 72* 58* 74*   < > 83*   ALT 68 75* 75* 76*   < > 100*    < > = values in this interval not displayed.     INR  Recent Labs   Lab 11/06/21  0434 11/05/21  0434 11/04/21  0432 11/03/21  0745   INR 2.13* 2.07* 2.07* 2.21*         IMAGING/PROCEDURES:  Reviewed       ASSESSMENT/PLAN:  Dima Olivas is a 61 year old gentleman with history of liver cirrhosis secondary to HCV/ETOH, liver lesions and tumor thrombus with unremarkable biopsy which treated as HCC (over cholangiocarcinoma given liver cirrhosis), child Jang score B, 9 points at that time who started systemic treatment with bevacizumab (No esophageal varises on EGD) and atezolizumab on 10/22/2021 by Dr. Germain, who presented with new increased abdominal distention/pain with worsening pain and jaundice (total bilirubin 25.3, direct 20.2) and found to have intra-abdominal hematoma.         # Acute liver cirrhosis decompensation with new large ascites, worsening jaundice   # Intra-abdominal hematoma likely secondary to bevacizumab  # Liver cirrhosis secondary to HCV and EtOH  # Multiple liver lesion suggestive to HCC with systemic treatment atezolizumab and bevacizumab started on 10/22/2021  # Hepatorenal syndrome, MICHELLE  # Acute blood loss anemia due to abdominal bleeding     We reviewed his lab and chart today.  His labs are stable with elevated total bilirubin about 25 and creatinine 2.8 with stable hemoglobin at 8.8.  Patient feels a little bit better today  Long discussion was done yesterday with multidisciplinary team and now we will focus on supportive treatment and transition to comfort care at somepoint.     No new recommendation from oncology standpoint today.       Patient was seen and examined by my attending (Dmitry Ferreira MD ) and I. A/P were created as mentioned above.     Arielle Woody MD  Hematology&Oncology Fellow  Pager: [phone]

## 2021-11-06 NOTE — PLAN OF CARE
PT / 6B - PT orders received. Per discussion with RN/patient - he is mobilizing with SBA. Met with patient and spouse - both report no concerns regarding discharge from hospital from a mobility/safety standpoint. Patient declining IP PT this hospitalization - encouraged patient to complete walks as tolerated during the day. No further acute PT needs present. PT to complete consult.

## 2021-11-06 NOTE — PROGRESS NOTES
[facility]    Progress Note - Haily 1 Service        Date of Admission:  11/2/2021    Assessment & Plan             Dima Olivas is a 61 year old male admitted on 11/2/2021. 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     Today:  -Continue current, conservative measures over the weekend.  - Increase lactulose, given lack of bowel movements    #Cirhossis 2/2 EtOH + HCV  #Acute decompensation of cirrhosis   #Ascites  #Portal hypertensive gastropathy   #Coagulopathy   MELD-Na score: 37 at 11/6/2021  4:34 AM  MELD score: 37 at 11/6/2021  4:34 AM  Calculated from:  Serum Creatinine: 2.83 mg/dL at 11/6/2021  4:34 AM  Serum Sodium: 132 mmol/L at 11/6/2021  4:34 AM  Total Bilirubin: 25.6 mg/dL at 11/6/2021  4:34 AM  INR(ratio): 2.13 at 11/6/2021  4:34 AM  Age: 61 years    Presenting with acute abdominal pain, new ascites (reported first episode). On spironolactone 50, lasix 20 at home. AOx3. Decompensation may be 2/2 to continued alcohol use vs progression of HCC. INR 1.92, likely 2/2 acute synthetic dysfunction. CT Chest/Abd/Pelv significant for blood in peritoneal cavity 2/2 to hepatic hemorrhage from HCC lesion. Likely acute and causing this acute decompensation. Denies melena, or hematochezia.  EGD 9/24/2021 w/o evidence of varices, but notable for portable hypertensive gastropathy. Concern for SBP given acute presentation with large ascites, per 250/PMN unlikely SBP Paracentesis significant for yessy, bloody output 500mL.   Pt and wife informed of brevity of the situation, and are open to palliative discussion for goals of care.     -s/p paracentesis    >unlikely SBP based on correction,  culture NGTD   >cytology added to paracentesis sample   -S/p Albumin challenge x3 days  -Vit K challenge, 5 mg SQ or PO x3  -IV Zosyn q6 (renal adjustment)   -Dilaudid 0.3mg q2hr PRN   -Oxycodone 5mg Q4H PRN  -Lactulose   -HOLD spironolactone   -HOLD lasix  -PTA PPI   -Daily MELD labs   -Hgb q8 hrs   -Hepatology following, appreciate recs  -Palliative consult, appreciate recs   -Care conference on 11/5. Continued conservative measures over weekend to monitor for improvement. Not yet ready to move to comfort cares, but will re-assess on Monday, 11/8.  - Increased oxycodone to 10 mg q4hr PRN  - Started baclofen for hiccups, spasms      #Acute Blood Loss Anemia   #Normocytic anemia   #Hepatic hemorrhage  New 3gm Hgb drop 10/22 -> now. 11.7 --> 8.1.Denies symptoms of palpitations, lightheadedness, pre-syncope, or dizziness. Vitals stable, no hypotension, tachycardia, or dyspnea. CT significant for 3.8 x 6.8 x 7.3 cm hematoma in the anterior abdomenextending inferiorly from large mass in segment 2/3 of the liver.  Noactive extravasation is demonstrated on CT scan. Likely 2/2 hemorrhagic HCC lesion. Paracentesis frankly bloody. IR following - if continued decrease in Hgb can consider embolization with risk of worsening liver function. Repeat CT ON 11/3/2021 illustrating increasing density of abdominal ascites/fluid concerning for continued bleeding into peritoneal cavity. S/p 4uPRB, 2u transfused 11/4 for drop 7.0 --> 5.4.  - BID CBC  -Transfusion <7   >Pt consented  -Stat CT if pt illustrates acute Hgb drop concern of bleeding. Consider discussing with family in this scenario. Would discuss with patient/family first in this situation, given 11/5 care conference and desire for trial of conservative measures over the weekend.      #MICHELLE (slowly improving)  New MICHELLE, baseline ~0.92. Elevated acutely 2.82 in the setting of new ascites. Reported poor urine output even with continued use of lasix/spirinolactone. No dysuria, no  "hematuria. RUQ US does not illustrate any hydronephrosis. Intravascular hypovolemia in the setting of third spacing possibility. Must consider that pt may be developing Type 1, and less likely Type 2, HRS given the acute decompensation cirrhosis. Additionally, acute elevation in bili may be causing acute hyperbili nephrotoxicity. Reports some improvement of urine output following albumin, but Cr worsening now 3.67 concern for progression.   -S/p 100g Albumin 11/4/2021  -BID CMP   >K >5.5 shift with bicarb   -2g K+ diet  -2L fluid restriction   -Bladder pressure x1  -Strict I/Os  -Daily weights   -Renal consulted, appreciate recs    #Acute Pancreatitis   #BISAP 1   Arrived to ED with complaints of lower abdominal pain in the setting of acute cirrhosis decompensation. Lipase elevated 6k. First noted abdominal pain days following 1st round of chemotherapy atezolizumab/nevacozimab, noted that he noted feeling \"worse\" following chemotherapy. Most likely 2/2 acute alcohol use. History of stones but no CBD dilatation. Non-specific history of possible biliary colic. No history of abdominal trauma. No new medications.   - Liberalized diet, given overall clinical status  - Dilaudid 0.3mg q2hr PRN    -Avoid medications associated with pancreatitis (most commonly, loop diuretics, certain antibiotics, mesalamines, immunomodulators, valproic acid).      #Hyponatremia   New hypoNa 128, decreased from 10/22 at 140. Reports few episode of emesis. Approximately 10 day history of generalized anorexia, with intermittent non-blood emesis. Poor PO intake. In the setting of ascites, and volume overload per physical exam (JVD, mild crackles in lung bases bilaterally) and MICHELLE. Concern for intravascular hypovolemia hypoNa vs hypervolemic hypoNa. Must consider that pt may be developing Type 1, and less likely Type 2, HRS given the acute decompensation cirrhosis. Urine Na 7, Urine osms 370. With fluid restriction, and interventions increased " to 130.  -AM CMP   -Strict I/Os   -Daily weights   -Renal consulted, appreciate recs.     #HCC   HCC 2/2 untreated HCV, presumably from history of IV drug use per patient. S/p 1 dose chemotherapy with Bevacizumab and Tecentriq on 10/22/2021.   -Oncology following, appreciate recs: continue pain management, hospice planning. No further inpatient oncologic needs.        #Transaminitis   At time of infusion 10/22, transaminits 86/182 ALT > AST. CMP 10/13 , ALT 64. Chronic AST elevation.  New significant ALT elevation 184, . In the setting of diminished liver parenchyma given cirrhosis, still may be acute alcoholic pancreatitis given the especially elevated bili.   -AM CMP     #Acute on chronic jaundice  New worsened hyperbilirubinemia. ~2 1mo-2wks ago. RUQ US negative for acute obstruction with stone, or dilation of biliary tree. Discussion with panc/bili GI, unlikely obstructive cause of jaundice without observed dilation of biliary tree. Still actively using alcohol, approximately 5-6 drinks per week decreased 1 drink since symptoms started 1 weeks ago. Jaundice through abdomen, acute pruritis.  Given alcohol use history, may be from alcoholic hepatitis.     #Polysubstance Use   #AUD  #Nicotine Use   Long standing alcohol use, and nicotine use. Recently cut down from 5-6 drinks per night to 1 during acute illness past 10 days. Has never tried to quit in the past, is now interested. Has cut down from 1ppd to 1/3ppd in regards to nicotine. Open to speaking to chem dep.Last drink of alcohol approximately 24 hours ago.     -Nicotine patch in place.   -CIWA protocol     #Acute Deconditioning  #Malnutrition   Acute deconditioning and malnutrition in setting of catabolic state of cirrhosis. Presently NPO for acute pancreatitis, but plan for high protein diet when he can tolerate.     -IP nutrition consult, appreciate recs  -PT consult, appreciate recs        Diet: Fluid restriction 2000 ML  FLUID  Snacks/Supplements Adult: Other; Vital Cuisine, BID; Between Meals  Combination Diet    DVT Prophylaxis: Pneumatic Compression Devices  Yanes Catheter: Not present  Fluids: None   Central Lines: None  Code Status: No CPR- Do NOT Intubate      Disposition Plan   Expected discharge: 11/10/2021   recommended to TBD once weekend trial of conservative measures completed. Consideration for hospice at this time, home vs other.      The patient's care was discussed with the Attending Physician, Dr. Tee.    Jose Luis Shaffer MD  [address]  Securely message with the Vocera Web Console (learn more here)  Text page via [facility] Paging/Directory    Please see sign in/sign out for up to date coverage information       ______________________________________________________________________    Interval History     Reports good pain control this morning and feels like his abdomen is less distended. Denies feeling short of breath. Said he became tearful overnight when the weight of yesterday's care conference became fully apparent.     4 point ROS otherwise negative.     Data reviewed today: I reviewed all medications, new labs and imaging results over the last 24 hours. I personally reviewed no images or EKG's today.    Physical Exam   Vital Signs: Temp: 97.5  F (36.4  C) Temp src: Axillary BP: (!) 145/77 Pulse: 84   Resp: 20 SpO2: 95 % O2 Device: None (Room air)    Weight: 200 lbs 6.37 oz    Physical Exam  Constitutional:  No acute distress, jaundiced  HENT: Normocephalic and atraumatic, +scleral icterus  Cardiovascular: RRR, +murmur  Pulmonary: normal respiratory effort, +rales  Abdominal: +abdominal distension, mildly tender to palpation diffusely, hepatomegaly  Extremities: bilateral lower extremity edema  Skin: warm, jaundice  Neurological: alert, moving all 4 extremities spontaneously    Data   Recent Labs   Lab 11/06/21  1136 11/06/21  0434 11/05/21  1956  11/05/21  1137 11/05/21  0434 11/04/21  1206 11/04/21  0432 11/02/21  1747 11/02/21  0644   WBC  --  14.1*  --   --  16.9*  --  10.8   < > 13.4*   HGB 9.5* 8.8*  8.8* 9.2*   < > 8.7*   < > 7.1*   < > 8.1*   MCV  --  93  --   --  92  --  93   < > 97   PLT  --  63*  --   --  71*  --  77*   < > 135*   INR  --  2.13*  --   --  2.07*  --  2.07*   < > 1.92*   NA  --  132* 130*  --  130*   < > 130*   < > 128*   POTASSIUM  --  3.4 3.4  --  3.7   < > 4.0   < > 4.2   CHLORIDE  --  100 99  --  100   < > 99   < > 95   CO2  --  20 18*  --  17*   < > 19*   < > 23   BUN  --  84* 85*  --  84*   < > 95*   < > 75*   CR  --  2.83* 2.92*  --  3.23*   < > 3.67*   < > 2.82*   ANIONGAP  --  12 13  --  13   < > 12   < > 10   HERNESTO  --  8.1* 8.1*  --  7.8*   < > 7.7*   < > 7.6*   GLC  --  122* 154*  --  116*   < > 97   < > 132*   ALBUMIN  --  2.7* 3.0*  --  3.0*   < > 2.8*   < > 1.5*   PROTTOTAL  --  5.8* 6.0*  --  5.8*   < > 5.6*   < > 6.1*   BILITOTAL  --  25.6* 26.1*  --  24.1*   < > 23.8*   < > 25.3*   ALKPHOS  --  110 112  --  98   < > 93   < > 183*   ALT  --  68 75*  --  75*   < > 85*   < > 182*   AST  --  69* 72*  --  58*   < > 78*   < > 174*   LIPASE  --   --   --   --   --   --   --   --  6,327*    < > = values in this interval not displayed.     No results found for this or any previous visit (from the past 24 hour(s)).

## 2021-11-06 NOTE — PLAN OF CARE
"BP (!) 145/70 (BP Location: Left arm)   Pulse 86   Temp 97.6  F (36.4  C) (Oral)   Resp 18   Ht 1.777 m (5' 9.96\")   Wt 90.9 kg (200 lb 6.4 oz)   SpO2 92%   BMI 28.79 kg/m      Hours of Care: 2780-1916     Reason for admission: Admitted on 11/2 for acute decompensated cirrhosis, hepatic hemorrhage, c/b increased bilirubin, MICHELLE, ABLA, pancreatitis.  Hx of EtoH, cirrhosis, hepatitis C, hepatic CA.  Vitals: Hypertensive at times, otherwise VSS.   Activity: Up SBA.  Pain: Controlled with PO oxycodone.  Neuro: AO x 4.  Cardiac: SR.    Respiratory: WDL on RA.  GI/: Voiding spontaneously.  Last BM was 11/4.  Rounded, firm abdomen.  Diet: 2g Na, 2L FR.    Lines: PIV x 2 SL.  Skin/Wounds: WDL.   Plan: Pain control, trend Hgb, monitor kidney function. Hopefully home on hospice.    Lactic acid of 2.6 was drawn at approximately 1730.  Per RR NP gave 50mL of albumin as BP is stable.  Lactic recheck reordered for tomorrow AM.      Continue to monitor and follow POC     Jordan Calles RN on 11/6/2021 at 6:42 PM    "

## 2021-11-07 NOTE — PROGRESS NOTES
[facility]    Progress Note - Haily 1 Service        Date of Admission:  11/2/2021    Assessment & Plan             Dima Olivas is a 61 year old male admitted on 11/2/2021. 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     Today:  -Continue current, conservative measures over the weekend.  - BM this AM  - discontinue BID labs, and q8 hgb    -CBC AM, CMP AM, INR AM     #Cirhossis 2/2 EtOH + HCV  #Acute decompensation of cirrhosis   #Ascites  #Portal hypertensive gastropathy   #Coagulopathy   MELD-Na score: 36 at 11/7/2021  4:48 AM  MELD score: 35 at 11/7/2021  4:48 AM  Calculated from:  Serum Creatinine: 2.51 mg/dL at 11/7/2021  4:48 AM  Serum Sodium: 133 mmol/L at 11/7/2021  4:48 AM  Total Bilirubin: 24.7 mg/dL at 11/7/2021  4:48 AM  INR(ratio): 1.98 at 11/7/2021  4:48 AM  Age: 61 years    Presenting with acute abdominal pain, new ascites (reported first episode). On spironolactone 50, lasix 20 at home. AOx3. Decompensation may be 2/2 to continued alcohol use vs progression of HCC. INR 1.92, likely 2/2 acute synthetic dysfunction. CT Chest/Abd/Pelv significant for blood in peritoneal cavity 2/2 to hepatic hemorrhage from HCC lesion. Likely acute and causing this acute decompensation. Denies melena, or hematochezia.  EGD 9/24/2021 w/o evidence of varices, but notable for portable hypertensive gastropathy. Concern for SBP given acute presentation with large ascites, per 250/PMN unlikely SBP Paracentesis significant for yessy, bloody output 500mL.   Pt and wife informed of brevity of the situation, and are open to palliative discussion for goals of care.     -s/p paracentesis    >unlikely SBP  based on correction, culture NGTD   >cytology added to paracentesis sample   -S/p Albumin challenge x3 days  -Vit K challenge, 5 mg SQ or PO x3 completed   -IV Zosyn q6 (renal adjustment)   -Dilaudid 0.3mg q2hr PRN   -Oxycodone 5-10mg Q4H PRN  -Lactulose BID   -Baclofen 5mg TID PRN for hiccups/spasms   -HOLD spironolactone   -HOLD lasix  -PTA PPI   -Daily MELD labs   -Low Na diet   -Hepatology following, appreciate recs  -Palliative consult, appreciate recs   -Care conference on 11/5. Continued conservative measures over weekend to monitor for improvement. Not yet ready to move to comfort cares, but will re-assess on Monday, 11/8.      #Acute Blood Loss Anemia   #Normocytic anemia   #Hepatic hemorrhage  New 3gm Hgb drop 10/22 11.7 --> 8.1 on admit.Denies symptoms of palpitations, lightheadedness, pre-syncope, or dizziness. Vitals stable, no hypotension, tachycardia, or dyspnea. CT significant for 3.8 x 6.8 x 7.3 cm hematoma in the anterior abdomenextending inferiorly from large mass in segment 2/3 of the liver.  No active extravasation is demonstrated on CT scan. Likely 2/2 hemorrhagic HCC lesion. Paracentesis frankly bloody. IR following - if continued decrease in Hgb can consider embolization with risk of worsening liver function. Repeat CT ON 11/3/2021 illustrating increasing density of abdominal ascites/fluid concerning for continued bleeding into peritoneal cavity. S/p 4uPRB, 2u transfused 11/4 for drop 7.0 --> 5.4.  - BID CBC  -Transfusion <7   >Pt consented  -Stat CT if pt illustrates acute Hgb drop concern of bleeding. Consider discussing with family in this scenario. Would discuss with patient/family first in this situation, given 11/5 care conference and desire for trial of conservative measures over the weekend.      #MICHELLE (slowly improving)  New MICHELLE, baseline ~0.92. Elevated acutely 2.82 in the setting of new ascites. Reported poor urine output even with continued use of lasix/spirinolactone. No dysuria,  "no hematuria. RUQ US does not illustrate any hydronephrosis. Intravascular hypovolemia in the setting of third spacing possibility. Must consider that pt may be developing Type 1, and less likely Type 2, HRS given the acute decompensation cirrhosis. Acute increased abdominal pressure is likely contributing to MICHELLE via compression impairing perfusion. Additionally, acute elevation in bili may be causing acute hyperbili nephrotoxicity.Reports some improvement of urine output following albumin, which has been stable. Cr increaed 3.87 --> 2.5.     -S/p 100g Albumin 11/4/2021  -BID CMP   >K >5.5 shift with bicarb   -2g K+ diet  -2L fluid restriction   -Bladder pressure x1  -Strict I/Os  -Daily weights   -Renal consulted, appreciate recs    #Acute Pancreatitis   #BISAP 1   Arrived to ED with complaints of lower abdominal pain in the setting of acute cirrhosis decompensation. Lipase elevated 6k. First noted abdominal pain days following 1st round of chemotherapy atezolizumab/nevacozimab, noted that he noted feeling \"worse\" following chemotherapy. Most likely 2/2 acute alcohol use. History of stones but no CBD dilatation. Non-specific history of possible biliary colic. No history of abdominal trauma. No new medications.   - Liberalized diet, given overall clinical status  - Dilaudid 0.3mg q2hr PRN    - Oxycodone 5-10mg q4 PRN   -Avoid medications associated with pancreatitis (most commonly, loop diuretics, certain antibiotics, mesalamines, immunomodulators, valproic acid).      #Hyponatremia   New hypoNa 128, decreased from 10/22 at 140. Reports few episode of emesis. Approximately 10 day history of generalized anorexia, with intermittent non-blood emesis. Poor PO intake. In the setting of ascites, and volume overload per physical exam (JVD, mild crackles in lung bases bilaterally) and MICHELLE. Concern for intravascular hypovolemia hypoNa vs hypervolemic hypoNa. Must consider that pt may be developing Type 1, and less likely " Type 2, HRS given the acute decompensation cirrhosis. Urine Na 7, Urine osms 370. With fluid restriction, and interventions increased to 130.  -AM CMP   -Strict I/Os   -Daily weights   -Renal consulted, appreciate recs.     #HCC   HCC 2/2 untreated HCV, presumably from history of IV drug use per patient. S/p 1 dose chemotherapy with Bevacizumab and Tecentriq on 10/22/2021.   -Oncology signed off, appreciate recs: continue pain management, hospice planning. No further inpatient oncologic needs.        #Transaminitis   At time of infusion 10/22, transaminits 86/182 ALT > AST. CMP 10/13 , ALT 64. Chronic AST elevation.  New significant ALT elevation 184, . In the setting of diminished liver parenchyma given cirrhosis, still may be acute alcoholic pancreatitis given the especially elevated bili.   -AM CMP     #Acute on chronic jaundice  New worsened hyperbilirubinemia. ~2 1mo-2wks ago. RUQ US negative for acute obstruction with stone, or dilation of biliary tree. Discussion with panc/bili GI, unlikely obstructive cause of jaundice without observed dilation of biliary tree. Still actively using alcohol, approximately 5-6 drinks per week decreased 1 drink since symptoms started 1 weeks ago. Jaundice through abdomen, acute pruritis.  Given alcohol use history, may be from alcoholic hepatitis.     #Polysubstance Use   #AUD  #Nicotine Use   Long standing alcohol use, and nicotine use. Recently cut down from 5-6 drinks per night to 1 during acute illness past 10 days. Has never tried to quit in the past, is now interested. Has cut down from 1ppd to 1/3ppd in regards to nicotine. Open to speaking to chem dep.Last drink of alcohol approximately 24 PTA. CIWA was placed, but pt never required benzodiazepine for active withdrawal.     -Nicotine patch in place.     #Acute Deconditioning  #Malnutrition   Acute deconditioning and malnutrition in setting of catabolic state of cirrhosis.     -IP nutrition consult,  appreciate recs  -PT consult, appreciate recs        Diet: Fluid restriction 2000 ML FLUID  Snacks/Supplements Adult: Other; Vital Cuisine, BID; Between Meals  Combination Diet 2 gm NA Diet    DVT Prophylaxis: Pneumatic Compression Devices  Yanes Catheter: Not present  Fluids: None   Central Lines: None  Code Status: No CPR- Do NOT Intubate      Disposition Plan   Expected discharge: 11/10/2021   recommended to TBD once weekend trial of conservative measures completed. Consideration for hospice at this time, home vs other.      The patient's care was discussed with the Attending Physician, Dr. Tee.    Benji Agrawal MD  [address]  Securely message with the Vocera Web Console (learn more here)  Text page via [employer] Paging/Directory    Please see sign in/sign out for up to date coverage information       ______________________________________________________________________    Interval History     Pt reports that abdominal pain is well controlled with current pain regimen, but still feels discomfort for acute distension. Feeling acute dyspnea and deconditioning. His concern with returning home with or without hospice is ability to ambulate in his house especially with stairs. Does not have good option for first level lodging/sleeping in his home. Had small discussion regarding goals of care again, and determined that in the setting of a procedure that could perhaps improve his outcome/comfort he would not pursue if it had the risk of death or decreasing survival from weeks to days.     Denies acute fevers, chills, headache, or active abdominal pain. Had BM this morning.     4 point ROS otherwise negative.     Data reviewed today: I reviewed all medications, new labs and imaging results over the last 24 hours. I personally reviewed no images or EKG's today.    Physical Exam   Vital Signs: Temp: 97.6  F (36.4  C) Temp src: Oral BP: (!) 141/72 Pulse: 85    Resp: 18 SpO2: 96 % O2 Device: None (Room air)    Weight: 202 lbs 9.64 oz    Physical Exam  Constitutional:  No acute distress, jaundiced  HENT: Normocephalic and atraumatic, +scleral icterus  Cardiovascular: RRR, +murmur no S4 appreciated   Pulmonary: normal respiratory effort, +rales  Abdominal: +abdominal distension, mildly tender to palpation diffusely, hepatomegaly  Extremities: bilateral lower extremity edema  Skin: warm, jaundice  Neurological: alert, moving all 4 extremities spontaneously    Data   Recent Labs   Lab 11/07/21  0448 11/06/21  1642 11/06/21  1136 11/06/21  0434 11/05/21  1137 11/05/21  0434 11/02/21  1747 11/02/21  0644   WBC 13.7* 14.0*  --  14.1*  --  16.9*   < > 13.4*   HGB 8.9* 9.0* 9.5* 8.8*  8.8*   < > 8.7*   < > 8.1*   MCV 93 91  --  93  --  92   < > 97   PLT 54* 58*  --  63*  --  71*   < > 135*   INR 1.98*  --   --  2.13*  --  2.07*   < > 1.92*    133  --  132*   < > 130*   < > 128*   POTASSIUM 3.6 3.3*  --  3.4   < > 3.7   < > 4.2   CHLORIDE 102 100  --  100   < > 100   < > 95   CO2 21 21  --  20   < > 17*   < > 23   BUN 77* 83*  --  84*   < > 84*   < > 75*   CR 2.51* 2.50*  --  2.83*   < > 3.23*   < > 2.82*   ANIONGAP 10 12  --  12   < > 13   < > 10   HERNESTO 8.5 8.0*  --  8.1*   < > 7.8*   < > 7.6*   * 134*  --  122*   < > 116*   < > 132*   ALBUMIN 2.7* 2.7*  --  2.7*   < > 3.0*   < > 1.5*   PROTTOTAL 5.8* 5.8*  --  5.8*   < > 5.8*   < > 6.1*   BILITOTAL 24.7* 25.3*  --  25.6*   < > 24.1*   < > 25.3*   ALKPHOS 102 110  --  110   < > 98   < > 183*   ALT 60 66  --  68   < > 75*   < > 182*   AST 68* 70*  --  69*   < > 58*   < > 174*   LIPASE  --   --   --   --   --   --   --  6,327*    < > = values in this interval not displayed.     No results found for this or any previous visit (from the past 24 hour(s)).

## 2021-11-07 NOTE — PROGRESS NOTES
[facility]  Palliative Care Daily Progress Note       Recommendations & Counseling       Jigar reports current pain regimen is keeping pain at tolerable level and allowing him to rest comfortably. We discussed balancing pain control and sedation, and currently Jigar is ok with being more sleepy in exchange for better pain control. No changes to medications recommended today.    Discussed POC with eldest son Abbe at bedside, who will be meeting with Jigar's wife later today. Jigar is fairly stable but ultimately his prognosis is poor given multiple co-morbidities. We discussed Hospice, and I voiced support for discharging home if possible with their support.      In anticipation of Hospice discharge early in the week, recommend sending the patient with a 7 day supply of the following:    Hospice PRN orders:  - Bisacodyl 10 mg Suppository AK daily to bid prn constipation  - Tylenol 650 mg suppository PO/AK q4hr prn pain, fever  - Lorazepam  0.5-1.0 mg PO/SL/AK q4h prn anxiety/restlessness  - Atropine sulfate 1% opth solution 1-2 drops SL q2h prn secretions  - Senna 8.6 mg 1-2 tabs PO prn constipation  - Haldol 1-2 mg PO/SL/AK q6h prn nausea, agitation or delirium.     - Oxycodone 5-10mg Q3H PRN either PO tablets or SL intensol solution.        Assessments          Dima Olivas is a 61 year old male with a past medical history significant for cirrhosis 2/2 alcohol use and untreated Hep C and HCC s/p 1st round of chemotherapy Bevacizumab and Tecentriq on 10/22/2021. He was admitted for acute cirrhosis decompensation in the setting of hepatic hemorrhage likely 2/2 HCC lesion presenting with significant hyperbili, MICHELLE, ABLA, and pancreatitis. PC was consulted for assistance with GOC. Currently giving trial period over weekend to see if he stabilizes, however, very likely to discharge home with Hospice enrollment.     Today, the patient was seen for:  HCC  Decompensated  "cirrhosis  Abdominal pain  Ascites  Pancreatitis  Goals of care  Patient and family support    Prognosis, Goals, or Advance Care Planning was addressed today with: Yes. Discussed likelihood of discharging home with Hospice, given Jigar's desire to be comfortable and have good quality of life.    Mood, coping, and/or meaning in the context of serious illness were addressed today: No.            Interval History:     Chart review/discussion with unit or clinical team members:   Triggered sepsis protocol overnight. Lethargic but able to participate in cares/eat.    Per patient or family/caregivers today:  Jigar is quite sleepy but notes his pain is well controlled. He is OK with increased sedation as a side effect of better pain control. Eldest son Abbe is at bedside. He feels a comfort plan of care at home with Hospice is in alignment with his dad's wishes but is worried Jigar will push back due to not wanting to \"burden\" his family.    Key Palliative Symptoms:   # Pain severity the last 12 hours: moderate  # Dyspnea severity the last 12 hours: low    Patient is on opioids: bowels not assessed today.           Review of Systems:     Besides above, a complete 10+ ROS was reviewed and is unremarkable.          Medications:     I have reviewed this patient's medication profile and medications during this hospitalization.    Noted meds:    Oxycodone PO- 60mg in 24 hours.  Has not used baclofen.           Physical Exam:   Temp: 97.6  F (36.4  C) Temp src: Oral BP: (!) 141/72 Pulse: 85   Resp: 18 SpO2: 96 % O2 Device: None (Room air)    Gen: Lying in bed  Eyes: Icteric sclera  Resp: Breathing non-labored on room air  Abd: Markedly distended, tender in lower quadrants  Msk: No gross deformity  Skin:  Diffuse jaundice  Neuro: More sleepy today.  Mental status/Psych: Calm, does not appear anxious or depressed             Data Reviewed:     Reviewed recent labs and pertinent imaging  11/7- Cr 2.51, GFR 27, Albumin 2.7, LFTs with " elevated AST. WBC 13.7, Hgb 8.9, Plts 54.      Thank you for the opportunity to continue to participate in the care of this patient and family.  Please feel free to contact on-call palliative provider with any emergent needs.  We can be reached via team pager [phone] (answered 8-4:30 Monday-Friday); after-hours answering service ([phone]);     Anu Noriega DO / Palliative Medicine / Pager [phone] / [facility] Inpatient Team Consult Pager [phone] (answered 8am-430pm M-F) - ok to text page via [employer] / After-Hours Answering Service [phone] / Palliative Clinic in the [facility] at the [facility] - [phone] (scheduling); [phone] (triage).  Total time spent was 32 minutes including reviewing record, patient visit/exam, and documentation.  >50% of time was spent counseling and/or coordination of care regarding symptom management (pain) and plan of care (Hospice dispo) with patient, son, and attending physician..

## 2021-11-07 NOTE — PROVIDER NOTIFICATION
11/06/21 1700   Call Information   Date of Call 11/06/21   Time of Call 1700   Name of person requesting the team Jordan GALVAN   Title of person requesting team RN   RRT Arrival time 1710   Time RRT ended 1720   Reason for call   Type of RRT Adult   Primary reason for call Sepsis suspected   Sepsis Suspected Elevated Lactate level   Was patient transferred from the ED, ICU, or PACU within last 24 hours prior to RRT call? No   SBAR   Situation LA 2.8   Background Pt admitted with abdominal pain. Hep C, Cirrhosis, and HCC with hepatic hemorrhage.    Notable History/Conditions Cancer;End-Stage disease   Assessment AVSS, NAD, VSS   Interventions Labs   Patient Outcome   Patient Outcome Stabilized on unit   RRT Team   Attending/Primary/Covering Physician Haily 1   Date Attending Physician notified 11/06/21   Time Attending Physician notified 1700   Physician(s) Linsey Hernandez NP   Lead RN Tess GALVAN   RT n/a   Post RRT Intervention Assessment   Post RRT Assessment Stable/Improved   Date Follow Up Done 11/06/21   Time Follow Up Done 1905

## 2021-11-07 NOTE — PLAN OF CARE
Neuro: A/Ox4, answers questions appropriately & able to follow commands. Remains lethargic.  Cardiac:  SR, HR 80s. VSS.   Respiratory: O2 sats stable on RA, sating > 92%. C/o intermittent SOB d/t distention. Slight relief with sitting up.  GI/: Voiding adequately. Abdomen very distended/tender. BM x 1 this morning.   Diet/appetite: Tolerating 2g Na diet with 2L FR.   Activity:  A x SBA, but lethargic. Only able to tolerate short distances.  Pain: At acceptable level on current regimen; prn oxycodone q4h.   Skin: Intact, no new deficits noted. Jaundiced.  LDA's: PIV x 2, wdl.    Plan: Continue with POC, plan for home hospice. Notify primary team with changes.

## 2021-11-07 NOTE — PLAN OF CARE
"BP (!) 145/69 (BP Location: Right arm)   Pulse 85   Temp 97.6  F (36.4  C) (Oral)   Resp 20   Ht 1.777 m (5' 9.96\")   Wt 91.9 kg (202 lb 9.6 oz)   SpO2 94%   BMI 29.10 kg/m      Hours of Care: 1814-5570     Reason for admission: Admitted on 11/2 for acute decompensated cirrhosis, hepatic hemorrhage, c/b increased bilirubin, MICHELLE, ABLA, pancreatitis.  Hx of EtoH, cirrhosis, hepatitis C, hepatic CA.  Vitals: Hypertensive at times, otherwise VSS.   Activity: Up SBA.  Pain: Controlled with PO oxycodone.  Neuro: AO x 4. Lethargic.  Cardiac: SR.    Respiratory: WDL on RA.  GI/: Voiding spontaneously.  Last BM was 11/7.  Rounded, firm abdomen.  Diet: 2g Na, 2L FR.    Lines: PIV x 2 SL.  Skin/Wounds: WDL.   Plan: Pain control, trend Hgb, monitor kidney function. Hopefully home on hospice.      Continue to monitor and follow POC     Jordan Calles RN on 11/7/2021 at 6:14 PM      "

## 2021-11-08 NOTE — PROGRESS NOTES
"[facility]    Hepatology Follow-up    CC:  Jaundice    Dx:      Intra-abdominal hematoma              Infiltrative HCC              Decompensated cirrhosis (ascites) 2/2 ETOH/HCV              Untreated HCV              Alcohol use disorder with active use    24 hour events: Hemoglobin remained stable at 9.4     Subjective: Has some ongoing pain.    Patient denies fevers, sweats or chills.    Medications  Current Facility-Administered Medications   Medication Dose Route Frequency    lactulose  10 g Oral BID    nicotine  1 patch Transdermal Daily    nicotine   Transdermal Q8H    pantoprazole  40 mg Oral Daily    piperacillin-tazobactam  2.25 g Intravenous Q6H    sennosides  8.6 mg Oral BID    sodium chloride (PF)  3 mL Intracatheter Q8H       Review of systems  A 10-point review of systems was negative.    Examination  BP (!) 140/69 (BP Location: Right arm)   Pulse 78   Temp 97.8  F (36.6  C) (Oral)   Resp 20   Ht 1.777 m (5' 9.96\")   Wt 92 kg (202 lb 13.2 oz)   SpO2 94%   BMI 29.13 kg/m      Intake/Output Summary (Last 24 hours) at 11/8/2021 0801  Last data filed at 11/8/2021 0700  Gross per 24 hour   Intake 650 ml   Output 950 ml   Net -300 ml       Gen- lying in bed, appears jaundiced  CVS- 3+ edema to the knee  RS- comfortable on room air  Abd- distended, mildly tense  Neuro- sleeping in bed  Skin- jaundiced  Psych- normal mood    Laboratory  Lab Results   Component Value Date     11/08/2021     07/07/2021    POTASSIUM 3.7 11/08/2021    POTASSIUM 3.7 07/07/2021    CHLORIDE 102 11/08/2021    CHLORIDE 110 07/07/2021    CO2 21 11/08/2021    CO2 28 07/07/2021    BUN 78 11/08/2021    BUN 8 07/07/2021    CR 2.43 11/08/2021    CR 0.89 07/07/2021       Lab Results   Component Value Date    BILITOTAL 26.7 11/08/2021    BILITOTAL 3.3 07/07/2021    ALT 57 11/08/2021    ALT 59 07/07/2021    AST 66 11/08/2021     07/07/2021    ALKPHOS 93 11/08/2021    ALKPHOS 149 07/07/2021 "       Lab Results   Component Value Date    WBC 13.6 11/08/2021    WBC 4.4 07/07/2021    HGB 9.4 11/08/2021    HGB 10.6 07/07/2021    MCV 93 11/08/2021     07/07/2021    PLT 57 11/08/2021    PLT 57 07/07/2021       Lab Results   Component Value Date    INR 1.85 11/08/2021    INR 1.55 07/07/2021     MELD-Na score: 35 at 11/8/2021  4:53 AM  MELD score: 34 at 11/8/2021  4:53 AM  Calculated from:  Serum Creatinine: 2.43 mg/dL at 11/8/2021  4:53 AM  Serum Sodium: 133 mmol/L at 11/8/2021  4:53 AM  Total Bilirubin: 26.7 mg/dL at 11/8/2021  4:53 AM  INR(ratio): 1.85 at 11/8/2021  4:53 AM  Age: 61 years    Assessment  Mr. Olivas is a 61 year old man with decompensated cirrhosis due to HCV/ETOH (ascites) with ongoing alcohol use and infiltrative HCC on bevacizumab/atezolizumab and hepatology is consulted in the context of jaundice.  Found to have intra-abdominal hematoma in relation to HCC.    Dr. Matt and I spoke with Jigar and his wife at length today.  The plan is for transition to hospice and discharge home tomorrow.  He still has ongoing abdominal pain in the setting of ascites and distension.    Discussed options including pain control / diuretics vs small volume paracentesis.  Dr. Agrawal of medicine has also spoken with the team and they are planning for medical management with diuretics.    Given their wishes, this is reasonable - would recommend diuretics per medicine/nephrology for edema and minimizing ascites accumulation.  Could consider low sodium diet, but if transitioning to comfort care this should be weighed vs benefits of eating what gives him pleasure.  If they changed their minds, a small volume (limit 1000 ml) paracentesis could be considered, though this does give a potential risk of worsening the intraabdominal bleeding.    Given plans for discharge with hospice and excellent cares from Dr. Agrawal and team, hepatology will sign off at this point.    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 Matt.    ATTENDING NOTE, GASTROENTEROLOGY/HEPATOLOGY    I saw and discussed this patient with the fellow and participated in the decision making. I agree with the fellow's note. Meli Matt MD

## 2021-11-08 NOTE — PLAN OF CARE
Neuro: A/Ox4, overall able to make needs known & follows commands appropriately. Lethargic.   Cardiac: SR, VSS.   Respiratory: O2 sats stable on RA, denies SOB.  GI/: Adequate urine output. BM x 1 this shift. Abdomen distended/tender. Lactulose BID.   Diet/appetite: Tolerating 2g Na diet w/ 2L FR.   Activity:  Ax SBA (lethargic)  Pain: Consistent pain/discomfort from distention; prn oxy q4h for abdominal pain.  Skin: Intact, no new deficits noted. Jaundiced.  LDA's: PIV x 2 wdl, saline locked.    Plan: Home with hospice hopefully today. Continue with POC. Notify primary team with changes.

## 2021-11-08 NOTE — PROGRESS NOTES
Care Management Follow Up    Length of Stay (days): 6    Expected Discharge Date: 11/10/2021     Concerns to be Addressed: Discharge planning      Patient plan of care discussed at interdisciplinary rounds: Yes    Anticipated Discharge Disposition: Anticipates home with hospice   Anticipated Discharge Services: TBD   Anticipated Discharge DME: TBD      Patient/family educated on Medicare website which has current facility and service quality ratings: yes  Education Provided on the Discharge Plan: yes  Patient/Family in Agreement with the Plan: yes    Referrals Placed by CM/SW: None at this time  Private pay costs discussed: Not applicable    Additional Information:  SW following for dispo needs- per pt's med team, pt may be appropriate for discharge to home with hospice in the next day or so. SW met with pt in room to check in and provide support. Pt was lethargic throughout meeting and was unable to finish conversation with SW. SW asked permission to call pt's spouse and pt shared that she had left for the day and requested SW call her another time.     SW to continue following for dispo needs.    BECK Anaya, LGSW  [address]   [facility] [place]  Phone: [phone]  Pager: [phone]

## 2021-11-08 NOTE — PROGRESS NOTES
[facility]    Progress Note - Haily 1 Service        Date of Admission:  11/2/2021    Assessment & Plan             Dima Olivas is a 61 year old male admitted on 11/2/2021. 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     Today:  -Restart 40mg lasix PO and 100mg spironolactone for LE edema and possible ascites improvement.    -per hepatology can consider low volume <1L paracentesis for comfort measures. With discussions with Jigar, would not opt for procedure that had risk to radically shorten expectancy due to risk or catastrophic outcome. Per discussion with Petrona and Jigar, is hesitant to proceed with paracentesis.   -Methadone 2.5mg every day for pain       #Cirhossis 2/2 EtOH + HCV  #Acute decompensation of cirrhosis   #Ascites  #Portal hypertensive gastropathy   #Coagulopathy, Thrombocytopenia 2/2 ESLD   MELD-Na score: 35 at 11/8/2021  4:53 AM  MELD score: 34 at 11/8/2021  4:53 AM  Calculated from:  Serum Creatinine: 2.43 mg/dL at 11/8/2021  4:53 AM  Serum Sodium: 133 mmol/L at 11/8/2021  4:53 AM  Total Bilirubin: 26.7 mg/dL at 11/8/2021  4:53 AM  INR(ratio): 1.85 at 11/8/2021  4:53 AM  Age: 61 years    Presenting with acute abdominal pain, new ascites (reported first episode). On spironolactone 50, lasix 20 at home. AOx3. Decompensation may be 2/2 to continued alcohol use vs progression of HCC. INR 1.92, likely 2/2 acute synthetic dysfunction. CT Chest/Abd/Pelv significant for blood in peritoneal cavity 2/2 to hepatic hemorrhage from HCC lesion. Likely acute and causing this acute decompensation. Denies melena, or hematochezia.  EGD 9/24/2021 w/o evidence of varices, but notable for  portable hypertensive gastropathy. Concern for SBP given acute presentation with large ascites, per 250/PMN unlikely SBP Paracentesis significant for yessy, bloody output 500mL.   Pt and wife informed of brevity of the situation, and are open to palliative discussion for goals of care.     -s/p paracentesis    >unlikely SBP based on correction, culture NGTD  -S/p Albumin challenge x3 days  -Vit K challenge, 5 mg SQ or PO x3 completed   -IV Zosyn q6 (renal adjustment)   -Dilaudid 0.3mg q2hr PRN   -Oxycodone 5-10mg Q4H PRN  -methadone 2.5mg every day   -Lactulose BID   -Baclofen 5mg TID PRN for hiccups/spasms   -spironolactone 100mg QD  -lasix 40mg every day   -PTA PPI   -Daily MELD labs   -Low Na diet   -Hepatology signed off, appreciate recs   -Palliative consult, appreciate recs   -Care conference on 11/5 - opted for conservative measures over weekend to monitor for improvement, moving towards minimal intervention but not full comfort cares 11/8, and pt is not yet ready to go home          #Acute Blood Loss Anemia   #Normocytic anemia   #Hepatic hemorrhage  New 3gm Hgb drop 10/22 11.7 --> 8.1 on admit.Denies symptoms of palpitations, lightheadedness, pre-syncope, or dizziness. Vitals stable, no hypotension, tachycardia, or dyspnea. CT significant for 3.8 x 6.8 x 7.3 cm hematoma in the anterior abdomenextending inferiorly from large mass in segment 2/3 of the liver.  No active extravasation is demonstrated on CT scan. Likely 2/2 hemorrhagic HCC lesion. Paracentesis frankly bloody. Repeat CT ON 11/3/2021 illustrating increasing density of abdominal ascites/fluid concerning for continued bleeding into peritoneal cavity. S/p 4uPRB, 2u transfused 11/4 for drop 7.0 --> 5.4.  - BID CBC  -Transfusion <7   >Pt consented  -Stat CT if pt illustrates acute Hgb drop concern of bleeding and possible IR embolization. Consider discussing with family in this scenario. Would discuss with patient/family first in this situation, given  "11/5 care conference and desire for trial of conservative measures over the weekend.      #MICHELLE (slowly improving)  New MICHELLE, baseline ~0.92. Elevated acutely 2.82 in the setting of new ascites. Reported poor urine output even with continued use of lasix/spirinolactone. No dysuria, no hematuria. RUQ US does not illustrate any hydronephrosis. Intravascular hypovolemia in the setting of third spacing possibility. Must consider that pt may be developing Type 1, and less likely Type 2, HRS given the acute decompensation cirrhosis. Acute increased abdominal pressure is likely contributing to MICHELLE via compression impairing perfusion. Additionally, acute elevation in bili may be causing acute hyperbili nephrotoxicity.Reports some improvement of urine output following albumin, which has been stable. Cr increaed 3.87 --> 2.5.     -S/p 100g Albumin daily x3 d  -AM CMP    -Strict I/Os  -Daily weights   - resumed diuretics for edema pain management   -Renal consulted, appreciate recs    #Acute Pancreatitis   #BISAP 1   Arrived to ED with complaints of lower abdominal pain in the setting of acute cirrhosis decompensation. Lipase elevated 6k. First noted abdominal pain days following 1st round of chemotherapy atezolizumab/nevacozimab, noted that he noted feeling \"worse\" following chemotherapy. Most likely 2/2 acute alcohol use. History of stones but no CBD dilatation. Non-specific history of possible biliary colic. No history of abdominal trauma. No new medications.   - Liberalized diet, given overall clinical status  - Dilaudid 0.3mg q2hr PRN    - Oxycodone 5-10mg q4 PRN   -Avoid medications associated with pancreatitis (most commonly, loop diuretics, certain antibiotics, mesalamines, immunomodulators, valproic acid).      #Hyponatremia   New hypoNa 128, decreased from 10/22 at 140. Reports few episode of emesis. Approximately 10 day history of generalized anorexia, with intermittent non-blood emesis. Poor PO intake. In the setting " of ascites, and volume overload per physical exam (JVD, mild crackles in lung bases bilaterally) and MICHELLE. Concern for intravascular hypovolemia hypoNa vs hypervolemic hypoNa. Must consider that pt may be developing Type 1, and less likely Type 2, HRS given the acute decompensation cirrhosis. Urine Na 7, Urine osms 370. With fluid restriction, and interventions increased to 130.  -AM CMP   -Strict I/Os   -Daily weights   -Renal consulted, appreciate recs.     #HCC   HCC 2/2 untreated HCV, presumably from history of IV drug use per patient. S/p 1 dose chemotherapy with Bevacizumab and Tecentriq on 10/22/2021.   -Oncology signed off, appreciate recs: continue pain management, hospice planning. No further inpatient oncologic needs.        #Transaminitis   At time of infusion 10/22, transaminits 86/182 ALT > AST. CMP 10/13 , ALT 64. Chronic AST elevation.  New significant ALT elevation 184, . In the setting of diminished liver parenchyma given cirrhosis, still may be acute alcoholic pancreatitis given the especially elevated bili.   -AM CMP     #Acute on chronic jaundice  New worsened hyperbilirubinemia. ~2 1mo-2wks ago. RUQ US negative for acute obstruction with stone, or dilation of biliary tree. Discussion with panc/bili GI, unlikely obstructive cause of jaundice without observed dilation of biliary tree. Still actively using alcohol, approximately 5-6 drinks per week decreased 1 drink since symptoms started 1 weeks ago. Jaundice through abdomen, acute pruritis.  Given alcohol use history, may be from alcoholic hepatitis.     #Polysubstance Use   #AUD  #Nicotine Use   Long standing alcohol use, and nicotine use. Recently cut down from 5-6 drinks per night to 1 during acute illness past 10 days. Has never tried to quit in the past, is now interested. Has cut down from 1ppd to 1/3ppd in regards to nicotine. Open to speaking to chem dep.Last drink of alcohol approximately 24 PTA. CIWA was placed, but pt  never required benzodiazepine for active withdrawal.     -Nicotine patch in place.     #Acute Deconditioning  #Malnutrition   Acute deconditioning and malnutrition in setting of catabolic state of cirrhosis.     -IP nutrition consult, appreciate recs  -PT consult, appreciate recs        Diet: Fluid restriction 2000 ML FLUID  Snacks/Supplements Adult: Other; Vital Cuisine, BID; Between Meals  Combination Diet 2 gm K Diet; 2 gm NA Diet    DVT Prophylaxis: Pneumatic Compression Devices  Aynes Catheter: Not present  Fluids: None   Central Lines: None  Code Status: No CPR- Do NOT Intubate      Disposition Plan   Expected discharge: 11/10/2021   recommended to TBD once weekend trial of conservative measures completed. Consideration for hospice at this time, home vs other.      The patient's care was discussed with the Attending Physician, Dr. Tee.    Benji Agrawal MD  [address]  Securely message with the Vocera Web Console (learn more here)  Text page via "[employer]" Paging/Directory    Please see sign in/sign out for up to date coverage information       ______________________________________________________________________    Interval History     Pt reports that abdominal distension is uncomfortable, with distension pain not well controlled with current regimen. At this time not ready to transition to comfort cares, but planning to discharge tomorrow with comfort cares. Interested in options to decrease LE edema as well as. Continues to urinate, and have bowel movements.      Denies acute fevers, chills, headache, or active abdominal pain. Had BM this morning.     4 point ROS otherwise negative.     Data reviewed today: I reviewed all medications, new labs and imaging results over the last 24 hours. I personally reviewed no images or EKG's today.    Physical Exam   Vital Signs: Temp: 97.9  F (36.6  C) Temp src: Oral BP: 136/72 Pulse: 84   Resp: 18 SpO2: 94  % O2 Device: None (Room air)    Weight: 202 lbs 13.17 oz    Physical Exam  Constitutional:  No acute distress, jaundiced  HENT: Normocephalic and atraumatic, +scleral icterus  Cardiovascular: RRR, +murmur no S4 appreciated   Pulmonary: normal respiratory effort, +rales  Abdominal: +abdominal distension, mildly tender to palpation diffusely, hepatomegaly  Extremities: bilateral lower extremity edema, +2   Skin: warm, jaundice  Neurological: alert, moving all 4 extremities spontaneously    Data   Recent Labs   Lab 11/08/21  0453 11/07/21  0448 11/06/21  1642 11/06/21  1136 11/06/21  0434 11/02/21  1747 11/02/21  0644   WBC 13.6* 13.7* 14.0*  --  14.1*   < > 13.4*   HGB 9.4* 8.9* 9.0*   < > 8.8*  8.8*   < > 8.1*   MCV 93 93 91  --  93   < > 97   PLT 57* 54* 58*  --  63*   < > 135*   INR 1.85* 1.98*  --   --  2.13*   < > 1.92*    133 133  --  132*   < > 128*   POTASSIUM 3.7 3.6 3.3*  --  3.4   < > 4.2   CHLORIDE 102 102 100  --  100   < > 95   CO2 21 21 21  --  20   < > 23   BUN 78* 77* 83*  --  84*   < > 75*   CR 2.43* 2.51* 2.50*  --  2.83*   < > 2.82*   ANIONGAP 10 10 12  --  12   < > 10   HERNESTO 8.7 8.5 8.0*  --  8.1*   < > 7.6*   * 116* 134*  --  122*   < > 132*   ALBUMIN 2.6* 2.7* 2.7*  --  2.7*   < > 1.5*   PROTTOTAL 6.0* 5.8* 5.8*  --  5.8*   < > 6.1*   BILITOTAL 26.7* 24.7* 25.3*  --  25.6*   < > 25.3*   ALKPHOS 93 102 110  --  110   < > 183*   ALT 57 60 66  --  68   < > 182*   AST 66* 68* 70*  --  69*   < > 174*   LIPASE  --   --   --   --   --   --  6,327*    < > = values in this interval not displayed.     No results found for this or any previous visit (from the past 24 hour(s)).

## 2021-11-08 NOTE — PLAN OF CARE
[id number]:  Neuro: A/Ox4. Lethargic.   Cardiac: SR, VSS.       Respiratory: O2 sats stable on RA, denies SOB.  GI/: Adequate urine output. No BM this shift. Declined morning Senna, may take evening dose.  Abdomen distended/tender. Lactulose BID.   Diet/appetite: Tolerating 2g Na diet w/ 2L FR. Appetite poor.  Activity:  SBA/Assistx1 (lethargic)  Pain: Distention discomfort; prn oxy x1.  Skin: Intact, no new deficits noted. Jaundiced.  LDA's: PIV x 2, saline locked.     Plan: Home with hospice tomorrow. Possible paracentesis today. Continue with POC. Notify primary team with changes/concerns.

## 2021-11-08 NOTE — PROGRESS NOTES
[facility]  Palliative Care Daily Progress Note       Recommendations & Counseling       Pain is adequately controlled though the patient and family would like him to be able to be a little more awake if possible. Discussed initiation of methadone ahead of discharging with hospice with the goal of more sustained pain control. Started 2.5 mg BID today (ordered for you).     Please plan to discharge him with supply of methadone solution with other hospice medications.    Reviewed and filled out POLST form with the patient's wife today.    Agree with the primary teams' plan to resume his diuretics for fluid retention. Recommend stopping all maintenance IV fluids (pt was getting 10 ml/hr NS while we were in the room).    Please see palliative care note by Dr. Noriega from 11/7 for recommended hospice medications to send on discharge.       Thank you for the opportunity to continue to participate in the care of this patient and family.  Please feel free to contact on-call palliative provider with any emergent needs.  We can be reached via team pager [phone] (answered 8-4:30 Monday-Friday); after-hours answering service ([phone]);     This patient's care was discussed with palliative care staff, Dr. Mehta.    Elise Don MD  Internal Medicine, PGY-3    Patient seen and evaluated with Dr. Don.   Agree with assessment and recommendations.    Total time spent was 45 minutes,  >50% of time was spent counseling and/or coordination of care regarding goals of care, hsopice care discussion and pain managemetn for patient with liver failure and HCC.    Aby Mehta  Palliative Care   Pager [phone]         Assessments          Dima Olivas is a 61 year old male with a past medical history significant for cirrhosis 2/2 alcohol use and untreated Hep C and HCC s/p 1st round of chemotherapy Bevacizumab and Tecentriq on 10/22/2021. He was admitted for  acute cirrhosis decompensation in the setting of hepatic hemorrhage likely 2/2 HCC lesion presenting with significant hyperbili, MICHELLE, ABLA, and pancreatitis. PC was consulted for assistance with GOC. Planning for discharge to home with hospice this week.    Today, the patient was seen for:  HCC  Decompensated cirrhosis  Abdominal pain  Ascites  Pancreatitis  Goals of care  Patient and family support    Prognosis, Goals, or Advance Care Planning was addressed today with: Yes. POLST filled out with patient's wife. Plan is for discharge to home with hospice, anticipating tomorrow.    Mood, coping, and/or meaning in the context of serious illness were addressed today: No.            Interval History:     Chart review/discussion with unit or clinical team members:   No acute events. Pain has been better controlled though the patient has been a lot more sleepy with the pain medications.    Per patient or family/caregivers today:  Jigar was sleeping through most of the visit today, so we spoke primarily with his wife.  She says that he has appeared to be pretty comfortable, though does note that he has been mostly sleeping.  A couple of days ago he was able to walk, but yesterday when his son was visiting he was too tired and did not.  She shares that there are is a lot of family support and a lot of people who want to see risk when he is able to come home, and for this reason they continue to feel that home hospice is the right decision.     Key Palliative Symptoms:   # Pain severity the last 12 hours: moderate  # Dyspnea severity the last 12 hours: low  # Nausea severity the last 12 hours: low    Patient is on opioids: bowels not assessed today.           Review of Systems:     Besides above, a complete 10+ ROS was unable to be completed due to patient's mental status.          Medications:     I have reviewed this patient's medication profile and medications during this hospitalization.           Physical Exam:   Temp:  97.7  F (36.5  C) Temp src: Oral BP: 139/65 Pulse: 78   Resp: 20 SpO2: 99 % O2 Device: None (Room air) Oxygen Delivery: 5 LPM  Gen: Sleeping  Eyes: Icteric sclera  Resp: Breathing non-labored on room air  Abd: Markedly distended, taught, diffusely tender  Msk: No gross deformity, 2-3+ pitting LE edema  Skin:  Diffuse jaundice  Neuro: More sleepy today  Mental status/Psych: Calm, does not appear anxious or depressed             Data Reviewed:     Reviewed recent labs and pertinent imaging  No new.

## 2021-11-09 NOTE — CONSULTS
Hospice consult complete with pt and spouse Petrona.  Pt lethargic and sleeps thru most of visit.  The hospice philosophy of care, available services and insurance benefit is explained and pt insurance verified.  Plan is for pt to discharge tomorrow home at 11 am via stretcher transport.  Appreciate Poncho MEJIA setting this up.  Hospice nurse will be pt residence at 1 pm for hospice intake visit.  DME of bed, obt, w/c and bsc ordered for delivery this evening.  Writer spoke with Dinesh Agrawal and Tyson to coordinate plan and comfort meds have been sent to pharmacy.  Please fill and send with pt upon discharge.  Care coordinated today with pt, spouse Petrona, Dinesh Mehta and Nghia and Poncho MEJIA who all agree to POC.    Thank you for this consult.    Shelbie Evans RN [facility]  Referral Specialist  [phone]

## 2021-11-09 NOTE — PROGRESS NOTES
[facility]  Palliative Care Daily Progress Note       Recommendations & Counseling       Stopped methadone due to unexpectedly high QTc 570 (at such low dose 2.5mg that was started yesterday I dont think it was the culprit and likely would not have a signficant effect on QTc at that dose but given how prolonged the QTc is and given their hope for more good time at home I think it makes sense to stop it out of caution.)     Started fentanyl patch 12mcg/hr. Opted not to start other oral long acting out of concern he will lose ability to swallow pills in near future. However it will take several days for fentanyl patch to fully work so will need to still use prn oxycodoen in meantime.     Continue prn oxycodone as ordered 5-10mg q 4 hours prn    POLST form was completed 11/8    Agree with lasix and stopping IVF    Consider stopping zosyn (this is day 7 and it is adding to his IVF)      In anticipation of Hospice discharge early in the week, recommend sending the patient with a 7 day supply of the following:       Hospice PRN orders:    - Bisacodyl 10 mg Suppository ME daily to bid prn constipation    - Tylenol 650 mg suppository PO/ME q4hr prn pain, fever    - Lorazepam  0.5-1.0 mg PO/SL/ME q4h prn anxiety/restlessness    - Atropine sulfate 1% opth solution 1-2 drops SL q2h prn secretions    - Senna 8.6 mg 1-2 tabs PO prn constipation    - Haldol 1-2 mg PO/SL/ME q6h prn nausea, agitation or delirium.     - Oxycodone 5-10mg Q3H PRN either PO tablets or SL intensol solution.       Thank you for the opportunity to continue to participate in the care of this patient and family.  Please feel free to contact on-call palliative provider with any emergent needs.  We can be reached via team pager [phone] (answered 8-4:30 Monday-Friday); after-hours answering service ([phone]);     Total time spent was 25 minutes,  >50% of time was spent counseling and/or coordination of  care regarding symptoms and hospice discharge planning for pt with liver failure, HCC.    Aby Mehta MD / Palliative Medicine / [facility] via [facility].              Assessments          Dima Olivas is a 61 year old male admitted on 11/2/2021. 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  PC was consulted for assistance with GOC. Planning for discharge to home with hospice this week.      Today, the patient was seen for:  HCC  Decompensated cirrhosis  Abdominal pain  Ascites  Pancreatitis  Goals of care  Patient and family support    Prognosis, Goals, or Advance Care Planning was addressed today with: No. POLST filled out with patient's 11/8.    Mood, coping, and/or meaning in the context of serious illness were addressed today: No.              Interval History:     Chart review/discussion with unit or clinical team members:   No acute events.  Started methadone yesterday  QTc checked yesterday evening QTc 570    Per patient or family/caregivers today:  Fatigued but awake  Some abdominal cramping, needed to have BM  Lots of abdominal distention  Denies SOB    Key Palliative Symptoms:   # Pain severity the last 12 hours: moderate  # Dyspnea severity the last 12 hours: low  # Nausea severity the last 12 hours: low    Patient is on opioids: bowels not assessed today.           Review of Systems:     Besides above, a complete 10+ ROS was unable to be completed due to patient's mental status.          Medications:     I have reviewed this patient's medication profile and medications during this hospitalization.    Lasix 40mg daily  Lactulose 10 g bid  Methadone 2.5mg bid -->started 11/8-->stopped today due to long QTc  Pantoprazole  Zosyn (day  7)  Senna  Spironolactone    PRN  Baclofen 5mg tid prn hiccupx - x 1 per day last 2 days  Oxycodone 5-10mg q 4 hours prn - 30 mg yesterday, none overnight, 10mg 430 this am           Physical Exam:   Temp: 97.7  F (36.5  C) Temp src: Oral BP: 139/71 Pulse: 86   Resp: 16 SpO2: 96 % O2 Device: None (Room air) Oxygen Delivery: 5 LPM  Gen: Sleeping but wakes easily  Eyes: Icteric sclera  Resp: Breathing non-labored on room air  Abd: Markedly distended, taught, diffusely tender  Msk: No gross deformity, 2-3+ pitting LE edema  Skin:  Diffuse jaundice  Neuro: wakes easily but fatigued,  Mental status/Psych: Calm, does not appear anxious or depressed             Data Reviewed:     Reviewed recent labs and pertinent imaging  Cr 2.4-->2.68  Bili stable 26-->25  WBC 13.6-->12.4  INR 2.03    MELD-Na score: 36 at 11/9/2021  4:32 AM  MELD score: 36 at 11/9/2021  4:32 AM  Calculated from:  Serum Creatinine: 2.68 mg/dL at 11/9/2021  4:32 AM  Serum Sodium: 135 mmol/L at 11/9/2021  4:32 AM  Total Bilirubin: 25.7 mg/dL at 11/9/2021  4:32 AM  INR(ratio): 2.03 at 11/9/2021  4:32 AM  Age: 61 years      EKG: QTc 570

## 2021-11-09 NOTE — PROGRESS NOTES
[facility]    Progress Note - Maroon 1 Service        Date of Admission:  11/2/2021    Assessment & Plan             Dima Olivas is a 61 year old male admitted on 11/2/2021. 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     Today:  -Transition to comfort cares   -discontinue IV zosyn   -discontinue methadone 2/2 elevated QTC   -START fentanyl patch 12mcg/hr   -opted to continue lactulose   -plan for discharge home with hospice 11/10 AM       #Cirhossis 2/2 EtOH + HCV  #Acute decompensation of cirrhosis   #Ascites  #Portal hypertensive gastropathy   #Coagulopathy, Thrombocytopenia 2/2 ESLD   MELD-Na score: 36 at 11/9/2021  4:32 AM  MELD score: 36 at 11/9/2021  4:32 AM  Calculated from:  Serum Creatinine: 2.68 mg/dL at 11/9/2021  4:32 AM  Serum Sodium: 135 mmol/L at 11/9/2021  4:32 AM  Total Bilirubin: 25.7 mg/dL at 11/9/2021  4:32 AM  INR(ratio): 2.03 at 11/9/2021  4:32 AM  Age: 61 years    Presenting with acute abdominal pain, new ascites (reported first episode). On spironolactone 50, lasix 20 at home. AOx3. Decompensation may be 2/2 to continued alcohol use vs progression of HCC. INR 1.92, likely 2/2 acute synthetic dysfunction. CT Chest/Abd/Pelv significant for blood in peritoneal cavity 2/2 to hepatic hemorrhage from HCC lesion. Likely acute and causing this acute decompensation. Denies melena, or hematochezia.  EGD 9/24/2021 w/o evidence of varices, but notable for portable hypertensive gastropathy. Concern for SBP given acute presentation with large ascites, per 250/PMN unlikely SBP Paracentesis significant for yessy, bloody output 500mL.   Pt and wife informed of brevity of the  situation, and are open to palliative discussion for goals of care.     -s/p paracentesis    >unlikely SBP based on correction, culture NGTD  -S/p Albumin challenge x3 days  -Vit K challenge, 5 mg SQ or PO x3 completed   -IV Zosyn q6 (renal adjustment) discontinued   -Dilaudid 0.3mg q2hr PRN   -Oxycodone 5-10mg Q4H PRN  -methadone 2.5mg every day discontinue   -START fentanyl patch 12.4mcg   -Lactulose BID   -Baclofen 5mg TID PRN for hiccups/spasms   -spironolactone 100mg every day, discharge on PTA   -lasix 40mg every day , discharge on PTA   -PTA PPI   -discontinue lab    -Low Na diet   -Hepatology signed off, appreciate recs   -Palliative consult, appreciate recs   -Care conference on 11/5. Continued conservative measures over weekend to monitor for improvement. Not yet ready to move to [facility], but will re-assess on Monday, 11/8.   -Plan for discharge home with hospice 11/10      #Acute Blood Loss Anemia   #Normocytic anemia   #Hepatic hemorrhage  New 3gm Hgb drop 10/22 11.7 --> 8.1 on admit.Denies symptoms of palpitations, lightheadedness, pre-syncope, or dizziness. Vitals stable, no hypotension, tachycardia, or dyspnea. CT significant for 3.8 x 6.8 x 7.3 cm hematoma in the anterior abdomenextending inferiorly from large mass in segment 2/3 of the liver.  No active extravasation is demonstrated on CT scan. Likely 2/2 hemorrhagic HCC lesion. Paracentesis frankly bloody. IR following - if continued decrease in Hgb can consider embolization with risk of worsening liver function. Repeat CT ON 11/3/2021 illustrating increasing density of abdominal ascites/fluid concerning for continued bleeding into peritoneal cavity. S/p 4uPRB, 2u transfused 11/4 for drop 7.0 --> 5.4.    -Pt on comfort cares       #MICHELLE (slowly improving)  New MICHELLE, baseline ~0.92. Elevated acutely 2.82 in the setting of new ascites. Reported poor urine output even with continued use of lasix/spirinolactone. No dysuria, no hematuria. RUQ US  "does not illustrate any hydronephrosis. Intravascular hypovolemia in the setting of third spacing possibility. Must consider that pt may be developing Type 1, and less likely Type 2, HRS given the acute decompensation cirrhosis. Acute increased abdominal pressure is likely contributing to MICHELLE via compression impairing perfusion. Additionally, acute elevation in bili may be causing acute hyperbili nephrotoxicity.Reports some improvement of urine output following albumin, which has been stable. Cr increaed 3.87 --> 2.5.     -S/p 100g Albumin 11/4/2021  -AM CMP discontinued     -2g K+ diet  -2L fluid restriction   On comfort cares     #Acute Pancreatitis   #BISAP 1   Arrived to ED with complaints of lower abdominal pain in the setting of acute cirrhosis decompensation. Lipase elevated 6k. First noted abdominal pain days following 1st round of chemotherapy atezolizumab/nevacozimab, noted that he noted feeling \"worse\" following chemotherapy. Most likely 2/2 acute alcohol use. History of stones but no CBD dilatation. Non-specific history of possible biliary colic. No history of abdominal trauma. No new medications.   - Liberalized diet, given overall clinical status  - Dilaudid 0.3mg q2hr PRN    - Oxycodone 5-10mg q4 PRN   -fentanyl patch 12.5 mcg   -Avoid medications associated with pancreatitis (most commonly, loop diuretics, certain antibiotics, mesalamines, immunomodulators, valproic acid).      #Hyponatremia   New hypoNa 128, decreased from 10/22 at 140. Reports few episode of emesis. Approximately 10 day history of generalized anorexia, with intermittent non-blood emesis. Poor PO intake. In the setting of ascites, and volume overload per physical exam (JVD, mild crackles in lung bases bilaterally) and MICHELLE. Concern for intravascular hypovolemia hypoNa vs hypervolemic hypoNa. Must consider that pt may be developing Type 1, and less likely Type 2, HRS given the acute decompensation cirrhosis. Urine Na 7, Urine osms " 370. With fluid restriction, and interventions increased to 130.  -AM CMP discontinued   -Renal consulted, appreciate recs.   -on comfort cares     #HCC   HCC 2/2 untreated HCV, presumably from history of IV drug use per patient. S/p 1 dose chemotherapy with Bevacizumab and Tecentriq on 10/22/2021.   -Oncology signed off, appreciate recs: continue pain management, hospice planning. No further inpatient oncologic needs.   -on comfort cares      #Transaminitis   At time of infusion 10/22, transaminits 86/182 ALT > AST. CMP 10/13 , ALT 64. Chronic AST elevation.  New significant ALT elevation 184, . In the setting of diminished liver parenchyma given cirrhosis, still may be acute alcoholic pancreatitis given the especially elevated bili.   -AM CMP discontinued   -on comfort cares     #Acute on chronic jaundice  New worsened hyperbilirubinemia. ~2 1mo-2wks ago. RUQ US negative for acute obstruction with stone, or dilation of biliary tree. Discussion with panc/bili GI, unlikely obstructive cause of jaundice without observed dilation of biliary tree. Still actively using alcohol, approximately 5-6 drinks per week decreased 1 drink since symptoms started 1 weeks ago. Jaundice through abdomen, acute pruritis.  Given alcohol use history, may be from alcoholic hepatitis.     #Polysubstance Use   #AUD  #Nicotine Use   Long standing alcohol use, and nicotine use. Recently cut down from 5-6 drinks per night to 1 during acute illness past 10 days. Has never tried to quit in the past, is now interested. Has cut down from 1ppd to 1/3ppd in regards to nicotine. Open to speaking to chem dep.Last drink of alcohol approximately 24 PTA. CIWA was placed, but pt never required benzodiazepine for active withdrawal.     -Nicotine patch in place.     #Acute Deconditioning  #Malnutrition   Acute deconditioning and malnutrition in setting of catabolic state of cirrhosis.          Diet: Fluid restriction 2000 ML  "FLUID  Snacks/Supplements Adult: Other; Vital Cuisine, BID; Between Meals  Combination Diet 2 gm K Diet; 2 gm NA Diet    DVT Prophylaxis: Pneumatic Compression Devices  Yanes Catheter: Not present  Fluids: None   Central Lines: None  Code Status: No CPR- Do NOT Intubate      Disposition Plan   Expected discharge: 11/10/2021   recommended to TBD once weekend trial of conservative measures completed. Consideration for hospice at this time, home vs other.      The patient's care was discussed with the Attending Physician, Dr. Tee.    Benji Agrawal MD  [address]  Securely message with the Vocera Web Console (learn more here)  Text page via [facility] Paging/Directory    Please see sign in/sign out for up to date coverage information       ______________________________________________________________________    Interval History     NAEO. Pt states that his pain is \"ok\", better controlled than before. Is sleepy. Still urinating. Denies changes in dyspnea. No fevers or chills. Per discussion, confirmed that he would not want to pursue paracentesis given the risk of catastrophic demise or acute bleeding that could shorten his life. Plan to discharge tomorrow on hospice.      Data reviewed today: I reviewed all medications, new labs and imaging results over the last 24 hours. I personally reviewed the EKG tracing showing Qtc elevation 570.    Physical Exam   Vital Signs: Temp: 97.7  F (36.5  C) Temp src: Oral BP: 139/71 Pulse: 86   Resp: 16 SpO2: 96 % O2 Device: None (Room air)    Weight: 204 lbs 12.92 oz    Physical Exam  Constitutional:  No acute distress, jaundiced, sleepy   HENT: Normocephalic and atraumatic, +scleral icterus  Cardiovascular: RRR, +murmur no S4 appreciated. Mild JVD.    Pulmonary: normal respiratory effort  Abdominal: +abdominal distension, mildly tender to palpation diffusely, hepatomegaly  Extremities: bilateral lower extremity edema, +2 "   Skin: warm, jaundice  Neurological: alert, moving all 4 extremities spontaneously. Asterixis present, tremulous    Data   Recent Labs   Lab 11/09/21  0432 11/08/21  0453 11/07/21  0448   WBC 12.4* 13.6* 13.7*   HGB 8.5* 9.4* 8.9*   MCV 93 93 93   PLT 41* 57* 54*   INR 2.03* 1.85* 1.98*    133 133   POTASSIUM 3.7 3.7 3.6   CHLORIDE 101 102 102   CO2 22 21 21   BUN 86* 78* 77*   CR 2.68* 2.43* 2.51*   ANIONGAP 12 10 10   HERNESTO 8.7 8.7 8.5   * 113* 116*   ALBUMIN 2.3* 2.6* 2.7*   PROTTOTAL 5.7* 6.0* 5.8*   BILITOTAL 25.7* 26.7* 24.7*   ALKPHOS 100 93 102   ALT 52 57 60   AST 65* 66* 68*     No results found for this or any previous visit (from the past 24 hour(s)).

## 2021-11-09 NOTE — PLAN OF CARE
Neuro: A&Ox4. Lethargic at times this afternoon. Pleasant, able to make needs known.  Cardiac: SR 80s. VSS.   Respiratory: Sating >95% on RA.  GI/: Adequate urine output. No BM this shift.  Diet/appetite: Tolerating 2gm Na, 2L FR diet.   Activity:  SBA, up to chair.  Pain: managed with prn oxy.   Skin: No new deficits noted.  LDA's:  -L PIV: SL  -R PIV: TKO    Plan: Potentially tap tomorrow before d/cing home on hospice. Continue with POC. Notify primary team with changes.

## 2021-11-09 NOTE — PLAN OF CARE
Temp: 98.2  F (36.8  C) Temp src: Oral BP: (!) 143/66 Pulse: 82   Resp: 16 SpO2: 94 % O2 Device: None (Room air)      Neuro: A&O to self and intermittently situation. Forgetful, slow to respond  Cardiac: SR, HR 80s. VSS. Afebrile   Respiratory: Sating >95% on RA.  GI/: Oliguric, voids using urinal, diuretics given. BM X1-lactulose given this am  Diet/appetite: Tolerating regular diet. Poor appetite.   Activity:  Assist of 1, up to chair  Pain: At acceptable level on current regimen. Abdominal pain-PRN oxy given x1, fentanyl patch applied to L shoulder  Skin: No new deficits noted. Jaundice   LDA's: L PIV tko, R PIV SL    Plan: Continue with POC. Notify primary team with changes.    Patient transitioned to comfort cares this evening. Wife at bedside. Appears comfortable. Plan to discharge tomorrow home on hospice

## 2021-11-09 NOTE — PROGRESS NOTES
Care Management Discharge Note    Discharge Date: 11/10/2021     Discharge Disposition: Home w/ Hospice  [facility]  [address]   [address]  [place] [address]     Discharge Services: Transportation, Hospice services ([facility])  Discharge DME: Hospice agency to provide    Discharge Transportation: Stretcher ride arranged for 11am with [employer].     Private pay costs discussed: transportation costs    PAS Confirmation Code: NA- pt discharging with hospice.   Patient/family educated on Medicare website which has current facility and service quality ratings: yes, pt's spouse requested specific facility.      Education Provided on the Discharge Plan: yes   Persons Notified of Discharge Plans: Pt, pt's spouse (Petrona), provider, and hospice liason (Shelbie Evans)  Patient/Family in Agreement with the Plan: yes    Handoff Referral Completed: No    Additional Information:  Pt will be discharging home tomorrow at 11am via stretcher ride with [employer]. [facility] will be admitting pt tomorrow at 1300 and will be delivering equipment tonight prior to discharge.    JACKIE completed PCS form and placed in chart. JACKIE paged providers with request to complete orders by 9am for hospice agency.     ADDENDUM: 1608  JACKIE spoke with Petrona and she shared that she spoke with pt's insurance and they will cover transportation as there is a contract with [facility]. JACKIE shared that she will document discussion.     BECK Anaya, LGSW  6B   [place] [facility]  Phone: [phone]  Pager: [phone]

## 2021-11-09 NOTE — PROGRESS NOTES
"  Nephrology Progress Note  11/08/2021         Assessment & Recommendations:   Dima Olivas is a 61 year old male with PMH of cirrhosis, HCC who presented with MICHELLE in the setting of hepatic hemorrhage likely 2/2 HCC lesion     MICHELLE, nonoliguric  Cr now improving and UOP remains adequate.   - After discussions with family, given that patient's prognosis remains grim, plan is to discharge home with hospice support.   - Okay to restart diuretics  - Avoid nephrotoxins  - Renally dose medications    Nephrology will sign off, please page with questions    Recommendations were communicated to primary team verbally    Seen and discussed with Dr. Star Medrano MD   [phone]    Interval History :   Nursing and provider notes from last 24 hours reviewed.  In the last 24 hours Dima Olivas continued to note some ongoing pain. Remains edematous. UOP adequate    Review of Systems:   Unable as pt somnolent    Physical Exam:   I/O last 3 completed shifts:  In: 1070 [P.O.:970; I.V.:100]  Out: 1520 [Urine:1520]   /66 (BP Location: Left arm)   Pulse 75   Temp 97.8  F (36.6  C) (Tympanic)   Resp 20   Ht 1.777 m (5' 9.96\")   Wt 92 kg (202 lb 13.2 oz)   SpO2 96%   BMI 29.13 kg/m       GENERAL APPEARANCE: somnolent  PULM: breathing non-labored  CV: regular rhythm, normal rate     -edema 3+   GI: soft, distended  NEURO: somnolent, does follow commands    Labs:   All labs reviewed by me  Electrolytes/Renal - Recent Labs   Lab Test 11/08/21  0453 11/07/21  0448 11/06/21  1642 11/05/21  1956 11/05/21  0434 11/04/21  0432 11/03/21  1949    133 133   < > 130*   < > 130*  130*   POTASSIUM 3.7 3.6 3.3*   < > 3.7   < > 4.4  4.4   CHLORIDE 102 102 100   < > 100   < > 97  97   CO2 21 21 21   < > 17*   < > 18*  18*   BUN 78* 77* 83*   < > 84*   < > 93*  93*   CR 2.43* 2.51* 2.50*   < > 3.23*   < > 3.58*  3.58*   * 116* 134*   < > 116*   < > 117*  117*   HERNESTO 8.7 8.5 8.0*   < > 7.8*   < > 7.5*  7.5* "   MAG  --   --   --   --  3.1*  --  3.2*   PHOS  --   --   --   --  4.4  --   --     < > = values in this interval not displayed.       CBC -   Recent Labs   Lab Test 11/08/21 0453 11/07/21 0448 11/06/21  1642   WBC 13.6* 13.7* 14.0*   HGB 9.4* 8.9* 9.0*   PLT 57* 54* 58*       LFTs -   Recent Labs   Lab Test 11/08/21 0453 11/07/21 0448 11/06/21  1642   ALKPHOS 93 102 110   BILITOTAL 26.7* 24.7* 25.3*   ALT 57 60 66   AST 66* 68* 70*   PROTTOTAL 6.0* 5.8* 5.8*   ALBUMIN 2.6* 2.7* 2.7*       Iron Panel - No lab results found.      Imaging:  All imaging studies reviewed by me.     Current Medications:    furosemide  40 mg Oral Daily     lactulose  10 g Oral BID     methadone  2.5 mg Oral Q12H     nicotine  1 patch Transdermal Daily     nicotine   Transdermal Q8H     pantoprazole  40 mg Oral Daily     piperacillin-tazobactam  2.25 g Intravenous Q6H     sennosides  8.6 mg Oral BID     sodium chloride (PF)  3 mL Intracatheter Q8H     spironolactone  100 mg Oral Daily       Cesar Medrano MD

## 2021-11-09 NOTE — PLAN OF CARE
Neuro: A&Ox4. Confused upon waking, reorients quickly.   Cardiac: SR. 130-150s. HR 70-80.   Respiratory: Sating >95% on RA.  GI/: Adequate urine output via urinal. Last BM 11/7.  Diet/appetite: Renal diet, but poor appetite. 2L fluid restriction, fluids encouraged.   Activity:  Assist of 1 to chair and bathroom.  Pain: At acceptable level on current regimen. One dose of 10 mg oxycodone given this shift.   Skin: No new deficits noted. Nicotine patch in place on left arm.  LDA's: 2 PIVs.    Plan: Pt may be discharging to home on hospice 11/9. Possible paracentesis for comfort before discharge. Continue with POC. Notify primary team with changes.

## 2021-11-09 NOTE — PROGRESS NOTES
"SPIRITUAL HEALTH SERVICES  SPIRITUAL ASSESSMENT Progress Note (Palliative Focus)  [facility] 6B    REFERRAL SOURCE: Palliative care consult, care conference    Attended care conference with IDT, pt Jigar, and spouse Petrona. Discussion centered on potential next steps for Jigar's plan of care. Jigar and Petrona seemed to be leaning towards taking a \"wait and see\" approach over the next couple days with the understanding that risks of doing anything right now might outweigh potential benefit. At the end of the meeting, I introduced Jigar and Petrona to spiritual health services. They expressed interested so I plan to follow up with them to offer spiritual and emotional support.      Plan: Will continue to follow while palliative health consult remains open.     Dillan Magana  Palliative Chaplain Resident  Pager [phone]    [facility] Palliative Care Inpatient Team Consult pager [phone] (M-F 8-4:30)  After-hours Answering Service [phone]    "

## 2021-11-09 NOTE — CONSULTS
Hospice referral received.  Pt being worked up for eligibility and [facility] will establish contact with pt/family upon chart review and insurance verification.    Thank you for this referral.    Shelbie Evans RN [facility]  Referral Specialist  C: [phone]  24 hour line: [phone]

## 2021-11-09 NOTE — PROGRESS NOTES
SPIRITUAL HEALTH SERVICES  SPIRITUAL ASSESSMENT Progress Note (Palliative Focus)  [facility] 6B    REFERRAL SOURCE: Palliative care consult, follow-up    Met with pt, Jigar, and spouse Petrona to offer emotional and spiritual support. Jigar was asleep through most of the visit, but he did wake up briefly and said he was feeling good today and not in pain. Prayed with Jigar and Petrona for God's comfort and presence in the days ahead.     Plan: Will continue to follow while a palliative consult remains open.    Dillan Magana  Palliative Chaplain Resident  Pager [phone]    [facility] Palliative Care Inpatient Team Consult pager [phone] (M-F 8-4:30)  After-hours Answering Service [phone]

## 2021-11-09 NOTE — PROGRESS NOTES
Care Management Follow Up    Length of Stay (days): 7    Expected Discharge Date: 11/10/2021     Concerns to be Addressed: Discharge planning       Patient plan of care discussed at interdisciplinary rounds: Yes    Anticipated Discharge Disposition: Home with hospice   Anticipated Discharge Services: TBD   Anticipated Discharge DME: TBD    Patient/family educated on Medicare website which has current facility and service quality ratings: yes   Education Provided on the Discharge Plan: yes   Patient/Family in Agreement with the Plan: yes     Referrals Placed by CM/SW:    [facility]  Shelbie Evans (mouna): [phone]    Private pay costs discussed: Not applicable    Additional Information:  SW following for dispo needs- per pt's med team, pt is med ready for discharge with hospice services. JACKIE spoke with pt's wife, Petrona, to discuss discharge planning. Petrona shared that she would like to stay within the [facility] system and requested that a referral be made to [facility]. Shelbie also asked if pt will be discharging today to determine whether or not she will     SW spoke with mouna, Shelbie Evans, and she will be submitting pt's insurance to ensure it is within network. JACKIE discussed potential discharge today and Shelbie will discuss with her staff the potential for intake today. SW to keep Petrona updated on discharge plan.    ADDENDUM: 9:30am  JACKIE spoke with Shelbie and she shared that [facility] does not have ability to admit today as they are full but will be able to admit tomorrow. Shelbie is waiting for insurance approval and will plan on communicating with Petrona if insurance is approved.    JACKIE updated Petrona and she will plan on coming to the hospital to visit pt today.     ADDENDUM: 1045  JACKIE spoke with Shelbie and she will plan on meeting with pt and family today to discuss intake pending insurance. Pt is being worked up for eligibility. Shelbie shared that they will be  able to admit pt tomorrow if insurance is accepted and requested a morning discharge.     JACKIE arranged a tentative stretcher ride for tomorrow (11/10) morning at 11am. JACKIE completed PCS Form and placed in chart.     ADDENDUM: 1541  JACKIE spoke with Shelbie Evans ([facility] liason) and pt's insurance was verified. Hospice will be able to admit pt tomorrow at 1pm. Shelbie is aware of discharge time at 11am. Hospice will be delivering equipments to pt's home tonight for discharge tomorrow. JACKIE was provided pt's new address to discharge to. Pt will be discharging to:   [facility] [address]  [address]   [address]  [place] [address]    JACKIE met with pt and Petrona in room to check in. Petrona confirmed address and shared that she will update family members. Per Petrona, there are no steps to enter the facility. JACKIE discussed potential private pay for transportation and Petrona expressed understanding. JACKIE encouraged Petrona to call pt's insurance directly to discuss coverage. Petrona to inform JACKIE if she would like to cancel ride after discussing with insurance. JACKIE provided Petrona with her number. Additionally, Petrona requested a WC for pt and JACKIE updated Shelbie of request.     JACKIE updated [facility] of new discharge address. JACKIE confirmed ride time for 11am tomorrow.     SW to continue following for dispo needs.     BECK Anaya, LGSW  6B   [place] [facility]  Phone: [phone]  Pager: [phone]

## 2021-11-10 NOTE — DISCHARGE SUMMARY
[facility]  Discharge Summary - Medicine & Pediatrics       Date of Admission:  11/2/2021  Date of Discharge:  11/10/2021  Discharging Provider: Nghia   Discharge Service: Haily Mendoza    Discharge Diagnoses   Hepatocellular Carcinoma  Decompensated cirrhosis  Acute blood loss anemia  Ascites  Coagulopathy  Thrombocytopenia  Acute Kidney Injury  Pancreatitis    Follow-ups Needed After Discharge   Follow up with hospice care provider and team for further medication and comfort needs.     Unresulted Labs Ordered in the Past 30 Days of this Admission     Date and Time Order Name Status Description    11/4/2021  1:15 PM Prepare red blood cells (unit) Preliminary       These results will be followed up by N/A    Discharge Disposition   Discharged to home with hospice  Condition at discharge: Terminal      Hospital Course   Dima Olivas was admitted on 11/2/2021 for decompensated cirrhosis, acute kidney injury, and hepatocellular carcinoma.     You were admitted to the hospital for acute worsening of your liver cirrhosis, hepatocellular carcinoma, and kidney function. You were treated empirically with IV antibiotics for possible infection, but the infectious work-up came back negative for any evidence of infection. Your blood counts decreased and CT imaging revealed bleeding from your liver cancer. You were given 4 units of blood during this admission due to the bleeding. Given the significant amount of bleeding from your liver cancer, we discussed a possible procedure with interventional radiology to stop the bleeding if it occurred again, but ultimately this was felt to be too high risk. Despite full supportive cares, your liver function failed to improve and your kidney function remained significantly decreased. Given the progression of your liver disease and cancer and lack of viable treatment options, we had a care conference with the kidney doctors, cancer doctors, liver  doctors, palliative care doctors, interventional radiology, and your primary medical team. Following a care conference, and shared decision making between yourself and your healthcare team we all decided that it was most aligned with your goals to go home with hospice care and to focus on making you comfortable.      The following problems were addressed during his hospitalization:    Cirrhosis due to EtOH and Hepatitis C   Acute Decompensation of Liver Cirrhosis c/b Ascites, Coagulopathy, and Hyperbilirubinemia    MELD 36  Pt presented 11/2 with acute, progressive abdominal pain, reported first time ascites, and new jaundice over 10 days. CT Chest/Abd/Pelv significant for blood in peritoneal cavity 2/2 to hepatic hemorrhage from HCC lesion,.also presumed to be trigger of acute decompensation. Paracentesis 11/2 notably for bloody output, and negative for SBP. Peripheral blood cultures negative. Pt was empirically treated with IV Zosyn, as he was high risk. MELD on admit 36, with max 40 during stay. Following care conference 11/5 pt and family initially opted to pursue conservative care management. Further, goals of care discussions elucidated that primary value was being home with family. Transitioned to comfort cares 11/9/2021. Accepted to [facility] for home hospice.     -Oxycodone 5-10mg Q3H PRN  -fentanyl patch 12.4mcg   -Lactulose BID   -Baclofen 5mg TID PRN for hiccups/spasms   -spironolactone 50mg every day  -lasix 20mg every day    Acute Blood Loss Anemia 2/2 Hepatic Hemorrhage  Normocytic Anemia   Presented with new 3gm Hgb drop 10/22 11.7 --> 8.1 on admit. Denied  symptoms of palpitations, lightheadedness, pre-syncope, or dizziness. Vitals stable, no hypotension, tachycardia, or dyspnea. CT significant for 3.8 x 6.8 x 7.3 cm hematoma in the anterior abdomenextending inferiorly from large mass in segment 2/3 of the liver.  No active extravasation is demonstrated on CT scan. Likely 2/2  "hemorrhagic HCC lesion. May have been triggered by  S/p 1 dose chemotherapy with Bevacizumab and Tecentriq on 10/22/2021 (VEGF inhibitor risk factor for thrombosis and bleeding). Received 4u pRBCs during admission.     -Comfort cares       MICHELLE, improving   New MICHELLE, baseline ~0.92. Elevated acutely 2.82 in the setting of new ascites. Reported poor urine output even with continued use of lasix/spirinolactone. No dysuria, no hematuria. RUQ US does not illustrate any hydronephrosis. Intravascular hypovolemia in the setting of third spacing possibility. Improvement in UOP following albumin challenge. Acute increased abdominal pressure is likely contributing to MICHELLE via compression impairing perfusion. Additionally, acute elevation in bili may be causing acute hyperbili nephrotoxicity. Cr max of 3.88, improved to ~2.5.     -comfort cares  -regular diet   -spironolactone 50mg every day  -lasix 20mg every day     #Acute Pancreatitis   #BISAP 1   Arrived to ED with complaints of lower abdominal pain in the setting of acute cirrhosis decompensation. Lipase elevated 6k. First noted abdominal pain days following 1st round of chemotherapy atezolizumab/nevacozimab, noted that he noted feeling \"worse\" following chemotherapy. Most likely 2/2 acute alcohol use. History of stones but no CBD dilatation. Non-specific history of possible biliary colic. No history of abdominal trauma. No new medications.     -Oxycodone 5-10mg Q3H PRN  -fentanyl patch 12.4mcg   -Lactulose BID   -Baclofen 5mg TID PRN for hiccups/spasms   -spironolactone 50mg every day  -lasix 20mg every day    #Hyponatremia, resolved.    New hypoNa 128, decreased from 10/22 at 140. Reports few episode of emesis. Approximately 10 day history of generalized anorexia, with intermittent non-blood emesis. Poor PO intake. In the setting of ascites, and volume overload per physical exam (JVD, mild crackles in lung bases bilaterally) and MICHELLE. Improved with fluid restriction. "     #HCC   HCC 2/2 untreated HCV, presumably from history of IV drug use per patient. S/p 1 dose chemotherapy with Bevacizumab and Tecentriq on 10/22/2021. No plans for further treatment. Transitioned to comfort cares.     #Transaminitis, improving    At time of infusion 10/22, transaminits 86/182 ALT > AST. CMP 10/13 , ALT 64. Chronic AST elevation.  New significant ALT elevation 184, . In the setting of diminished liver parenchyma given cirrhosis, still may be acute alcoholic hepatitis given the especially elevated bilirubin. Improvement AST 65, ALT 52.    #Acute on chronic jaundice  New worsened hyperbilirubinemia. ~2 1mo-2wks ago. RUQ US negative for acute obstruction with stone, or dilation of biliary tree. Discussion with panc/bili GI, unlikely obstructive cause of jaundice without observed dilation of biliary tree. Still actively using alcohol, approximately 5-6 drinks per week decreased 1 drink since symptoms started 1 weeks ago. Jaundice through abdomen, acute pruritis.  Given alcohol use history, may be from alcoholic hepatitis. However, most likely due from the acute decompensation itself.     #Polysubstance Use   #AUD  #Nicotine Use   Long standing alcohol use, and nicotine use. Recently cut down from 5-6 drinks per night to 1 during acute illness past 10 days. Has never tried to quit in the past, is now interested. Has cut down from 1ppd to 1/3ppd in regards to nicotine. Open to speaking to chem dep.Last drink of alcohol approximately 24 PTA. CIWA was placed, but pt never required benzodiazepine for active withdrawal.      -Nicotine patch in place.       Consultations This Hospital Stay   GI HEPATOLOGY ADULT IP CONSULT  GI PANCREATICOBILIARY ADULT IP CONSULT  ONCOLOGY ADULT IP CONSULT  CHEMICAL DEPENDENCY IP CONSULT  NEPHROLOGY GENERAL ADULT IP CONSULT  PALLIATIVE CARE ADULT IP CONSULT  INTERNAL MEDICINE PROCEDURE TEAM ADULT IP CONSULT [place] - [facility]  NUTRITION SERVICES ADULT IP  CONSULT  PHYSICAL THERAPY ADULT IP CONSULT  VASCULAR ACCESS CARE ADULT IP CONSULT  INTERVENTIONAL RADIOLOGY ADULT/PEDS IP CONSULT  SMOKING CESSATION PROGRAM IP CONSULT  CARE MANAGEMENT / SOCIAL WORK IP CONSULT  INTERNAL MEDICINE PROCEDURE TEAM ADULT IP CONSULT [place] - [facility]    Code Status   No CPR- Do NOT Intubate       The patient was discussed with Dr. Onur Johansen 1 Service  [facility] [address] [facility]  [address]  [place] [address]  Phone: [phone]    Physician Attestation   I, Cailin Mohr, saw and evaluated this patient prior to discharge.  I discussed the patient with the resident/fellow and agree with plan of care as documented in the note.      I personally reviewed vital signs, medications, labs, and imaging.    I personally spent 25 minutes on discharge activities.    Cailin Mohr MD  Date of Service (when I saw the patient): 11/10/2021  ______________________________________________________________________    Physical Exam   Vital Signs: Temp: 98.2  F (36.8  C) Temp src: Oral BP: (!) 143/66 Pulse: 82   Resp: 16 SpO2: 94 % O2 Device: None (Room air)    Weight: 204 lbs 12.92 oz  Physical Exam  Constitutional:       Comments: Alert, responsive to questions. Comfortable, NAD.    HENT:      Head: Normocephalic.   Eyes:      General: Scleral icterus present.      Extraocular Movements: Extraocular movements intact.   Pulmonary:      Effort: Pulmonary effort is normal.      Comments: Breathing comfortably on RA.   Abdominal:      General: There is distension.   Skin:     Coloration: Skin is jaundiced.   Neurological:      Mental Status: He is alert.             Primary Care Physician   Tracy Feliz    Discharge Orders      Activity    Your activity upon discharge: activity as tolerated     Reason for your hospital stay    Jigar Olivas you were admitted to the hospital for acute worsening of your liver cirrhosis and failure most likely caused by bleeding from your  hepatocellular carcinoma cancer. This bleeding into your belly caused an acute decline in the function of your liver and kidneys. You received 4 units of blood during this admission, because of the bleeding. There was no sign of acute infection in your belly. Following a care conference, and shared decision making between yourself and your healthcare team we all decided that it was most aligned with your goals to go home with hospice care and to focus treating your pain.     Diet    Follow this diet upon discharge: Regular       Significant Results and Procedures   Most Recent 3 CBC's:Recent Labs   Lab Test 11/09/21 0432 11/08/21 0453 11/07/21 0448   WBC 12.4* 13.6* 13.7*   HGB 8.5* 9.4* 8.9*   MCV 93 93 93   PLT 41* 57* 54*     Most Recent 3 BMP's:Recent Labs   Lab Test 11/09/21 0432 11/08/21 0453 11/07/21 0448    133 133   POTASSIUM 3.7 3.7 3.6   CHLORIDE 101 102 102   CO2 22 21 21   BUN 86* 78* 77*   CR 2.68* 2.43* 2.51*   ANIONGAP 12 10 10   HERNESTO 8.7 8.7 8.5   * 113* 116*     Most Recent 2 LFT's:Recent Labs   Lab Test 11/09/21 0432 11/08/21 0453   AST 65* 66*   ALT 52 57   ALKPHOS 100 93   BILITOTAL 25.7* 26.7*     Most Recent 3 INR's:Recent Labs   Lab Test 11/09/21 0432 11/08/21 0453 11/07/21 0448   INR 2.03* 1.85* 1.98*   ,   Results for orders placed or performed during the hospital encounter of 11/02/21   US Abdomen Limited    Narrative    EXAMINATION: US ABDOMEN LIMITED, 11/2/2021 7:56 AM     COMPARISON: MR Abdomen 10/13/2021    HISTORY: New ascites    TECHNIQUE: Gray scale ultrasound of the abdomen.    FINDINGS:  A targeted ultrasound of all 4 abdominal quadrants was performed and  demonstrated. Free fluid in all 4 abdominal quadrants.      Impression    IMPRESSION: Ascites in all 4 abdominal quadrants.    I have personally reviewed the examination and initial interpretation  and I agree with the findings.    STALIN PONCE MD         SYSTEM ID:  [id number]   US Abdomen Limited w  Abd/Pelvis Duplex Complete    Narrative    EXAMINATION: US ABDOMEN LIMITED WITH DOPPLER COMPLETE 11/2/2021 9:53  AM     COMPARISON: Same day ascites check, MRI 10/13/2021    HISTORY: Worsening liver failure    Additional information from the EMR: 61-year-old male with history of  HCC status post one round of chemotherapy. Presents to the emergency  Department with worsening abdominal pain and distention.    TECHNIQUE: The abdomen was scanned in standard fashion with  specialized ultrasound transducer(s) using both gray-scale, color  Doppler, and spectral flow techniques.    Findings:  Liver: The liver demonstrates coarsened and heterogeneous parenchymal  echotexture with nodular contours. In the right hepatic lobe is a  heterogeneous lesion that measures 3.2 x 2.8 x 3.2 cm, correlating  with the malignant-appearing lesion in segment VII seen on the  10/13/2021 MRI.     A recanalized periumbilical vein is seen with flow of 27 cm/s.    Extrahepatic portal vein flow is retrograde at 19 cm/s.  Right portal vein flow is retrograde, measuring 16 cm/s.  Left portal vein flow is retrograde, measuring 17 cm/s.    Flow in the hepatic artery is towards the liver and:  112 cm/s peak systolic  0.82 resistive index.     The splenic vein is patent and flow is towards the liver.  There is  nonocclusive thrombus in the main portal vein and left portal vein.  The IVC is patent with flow towards the heart.   The visualized aorta  is not dilated.    Gallbladder: Stones and sludge within the gallbladder. Borderline  gallbladder wall thickness measuring 3 mm, negative sonographic Raymond  sign.    Bile Ducts: Both the intra- and extrahepatic biliary system are of  normal caliber.  The common bile duct measures 3 mm.    Pancreas: Visualized portions of the head and body of the pancreas are  unremarkable. Pancreas is partially obscured.    Kidneys: The right kidney is of normal echotexture, without mass or  hydronephrosis.   Renal length:  12.8 cm. Simple right renal cyst  measuring up to 1.4 cm.    Fluid: Moderate to large volume ascites.      Impression    Impression:   1. Cirrhotic liver morphology with moderate to large volume ascites.  2. Heterogeneous mass in the right lobe of liver correlating with the  HCC seen on the prior MRI.  3. Retrograde flow of the main, left, and right portal veins. Patent  hepatic arteries.  4. Nonocclusive thrombus in the main and left portal veins, correlates  with the malignant-appearing thrombosis seen on prior MRI.  5. Cholelithiasis with sludge and borderline gallbladder wall  thickness, likely secondary to ascites and chronic liver disease.    I have personally reviewed the examination and initial interpretation  and I agree with the findings.    STALIN PONCE MD         SYSTEM ID:  [id number]   CT Chest Abdomen Pelvis w/o & w Contrast    Narrative    EXAMINATION: CT CHEST ABDOMEN PELVIS W/O & W CONTRAST, 11/2/2021  3:49 PM    TECHNIQUE:  Helical CT images from the thoracic inlet through the  symphysis pubis were obtained with IV contrast. Contrast dose:  iopamidol (ISOVUE-370) solution 120 mL      COMPARISON: Abdominal ultrasound 11/2/2021, abdominal MRI 10/13/2021    HISTORY: 60 y/o M PMH cirrhosis, Hep C, and HCC. S/p 1st dose of chemo  with jaundice, abdominal pain, and new acute anemia. Heme/onc  triphasic CT    FINDINGS:  CHEST:  LUNGS: Trachea and central airways are patent. No pneumothorax or  pleural effusion. Subsegmental atelectasis at the lung bases. 1 cm  glass nodule in the anterolateral right middle lobe (series 13, image  155).    MEDIASTINUM: Heart size is within normal limits. No pericardial  effusion. These remain pulmonary artery diameters are within normal  limits. Normal configuration of the vessels off the aortic arch.  Moderate to heavy dystrophic calcifications of the coronary arteries.  No suspicious mediastinal, hilar, or axillary lymph nodes. Thyroid  gland is unremarkable. Patulous  and fluid-filled esophagus.    ABDOMEN/PELVIS:  There is a mass in segment 2 and 3 of the liver with an infiltrating  morphology.  There is  hemorrhage at the inferior aspect of segment 3  into the anterior abdomen, the hematoma measures 3.8 x 6.8 x 7.3 cm  (series 5 image 195). There is no active extravasation currently.    LIVER: Cirrhotic configuration to the liver with evidence for portal  hypertension with moderate to large volume intermediate density  ascites and recanalized periumbilical vein and additional venous  collaterals in the right hemiabdomen. Thrombus in the main portal vein  extending into the left portal vein. The superior mesenteric vein is  patent. Asymmetrically enlarged and heterogeneously enhancing left  lobe similar in appearance to the previous examinations and may  represent infiltrative HCC.    Size the segment 2 and 3 infiltrating mass described above, there is  no significant change in size of multiple lesions scattered throughout  the liver since the most recent comparison MRI. For example, within  hepatic segment 7/8 there is a 1.2 cm arterially enhancing lesion  which persists on the portal venous phase images (series 9, image 83).  There is a similar-appearing lesion in hepatic segment 4B which  measures up to 1.1 cm (series 9, image 101). There is a nonenhancing  hypodense lesion in the right hepatic dome (segment VIII) measuring up  to 2.4 cm (series 9, image 41) this corresponds to the LI-RADS M  lesion on the comparison MRI.  The previously visualized are  identified lesion in hepatic segment VIII LI-RADS 5 lesion is not well  visualized on this exam.    BILIARY: Multiple layering gallstones. No pericholecystic fluid or  gallbladder wall thickening to suggest acute cholecystitis. There is  significant intrahepatic biliary ductal dilatation in the left hepatic  lobe.    PANCREAS: No suspicious pancreatic lesion. The main pancreatic duct is  not dilated.     SPLEEN: Enlarged  measuring 14 cm. Small splenule.    ADRENAL GLANDS: No focal adrenal nodule.    URINARY TRACT: Simple exophytic cyst in the interpolar region of the  right kidney. Additional too small to characterize hypodensities in  both kidneys likely benign renal cyst. No hydronephrosis. No renal  stone. The urinary bladder is unremarkable.    REPRODUCTIVE ORGANS: The prostate gland is unremarkable. Ascitic fluid  extending into the left inguinal canal.    STOMACH: Within normal limits.    BOWEL: No abnormally dilated loops of large or small bowel. There is  bowel wall thickening involving multiple loops of jejunum most  pronounced in the left hemiabdomen which is favored to be reactive  given the associated ascites. No evidence for acute GI bleed.    VESSELS: No aneurysmal dilatation of the abdominal aorta. The origins  of the celiac and superior mesenteric arteries are patent.    LYMPH NODES: The previously visualized suspicious masood hepatis lymph  node on the comparison MRI is not well visualized on today's exam. No  definite new or enlarging suspicious lymph nodes in the abdomen or  pelvis.    BONES/SOFT TISSUES: No aggressive osseous lesions. Intramuscular  lipoma involving the right gluteus externus muscle.      Impression    IMPRESSION:   1. Hemorrhage - 3.8 x 6.8 x 7.3 cm hematoma in the anterior abdomen  extending inferiorly from large mass in segment 2/3 of the liver.  No  active extravasation is demonstrated on CT scan.   2. Moderate to large volume ascites.  3. Cirrhotic configuration to the liver without significant change in  multiple nonenhancing and enhancing lesions scattered throughout the  hepatic parenchyma, which are better characterized on the comparison  abdominal MRI including a previous LI-RADS M lesion in hepatic segment  VIII.  4. Portal venous thrombus involving the main and left portal veins.  5. Portal hypertension with moderate to large volume ascites,  splenomegaly, recanalized umbilical vein,  and portosystemic shunts.  6. Bowel wall thickening involving multiple loops of small bowel in  the left hemiabdomen likely reactive due to the associated ascites.  7. Cholelithiasis without evidence for acute cholecystitis.    I have personally reviewed the examination and initial interpretation  and I agree with the findings.    MARTINA BARRIOS MD         SYSTEM ID:  [id number]   POC US Guide for Paracentesis    Impression    RLQ US in area of paracentesis, with catheter seen in pocket during procedure   XR Chest Port 1 View    Narrative    Exam: XR CHEST PORT 1 VIEW, 11/2/2021 6:10 PM    Comparison: CT chest abdomen and pelvis same date    History: Decompensation cirhossis    Findings:  A single portable semiupright view of the chest is obtained.    Trachea is midline. Mediastinum is within normal limits.  Cardiopulmonary silhouette is within normal limits. Increased  perihilar and generalized interstitial prominence. There is no  pneumothorax or pleural effusion. The upper abdomen is unremarkable.      Impression    Impression: Pulmonary vascular congestion.    I have personally reviewed the examination and initial interpretation  and I agree with the findings.    MARTINA BARRIOS MD         SYSTEM ID:  [id number]   CT Abdomen Pelvis w/o Contrast     Value    Radiologist flags Increase in density of diffuse ascites which may (AA)    Narrative    EXAMINATION: CT ABDOMEN PELVIS W/O CONTRAST, 11/3/2021 9:12 PM    TECHNIQUE:  Helical CT images from the lung bases through the  symphysis pubis were obtained without IV contrast.    COMPARISON: CT chest abdomen pelvis 11/2/2021    HISTORY: Abdominal distension; please eval for expanding hematoma    FINDINGS:    Liver: Cirrhotic configuration of the liver with masslike expansile  portion within the left lobe which correlates with known  hepatocellular carcinoma..  Biliary system: Gallbladder is within normal limits. Calcified  cholelithiasis.  Pancreas: No focal mass  or dilation of the main pancreatic duct.  Stomach: Within normal limits.  Spleen: Splenomegaly.  Adrenal glands: Within normal limits.  Kidneys: Unchanged left renal cyst. Bilateral persistent renal  nephrograms..  Bladder: Contrast opacification of the bladder.  Reproductive organs: The left testicles within inguinal canal.  Colon: Within normal limits.  Appendix: Within normal limits.  Small Bowel: Within normal limits.  Lymph nodes: No intra-abdominal or pelvic lymphadenopathy.  Vasculature: Aortobiiliac atherosclerotic disease.  Peritoneum: Large ascites. Similar appearance of hematoma inferior to  the left lower lobe hepatic mass within the peritoneum which measures  8 x 4 x 4.9 cm, previously 6.5 x 4 x 5.8 cm although the ascites is  now measuring of diffusely increased density compared to the prior  exam which may indicate increasing component of hemoperitoneum.    Lung bases: Bibasilar atelectasis..    Bones and soft tissues: Unchanged fracture deformities of the right  posterior 10th and 11th ribs.. No suspicious osseous lesion.      Impression    IMPRESSION:   1. Relatively unchanged in size of hematoma inferior to the left  hepatic mass.  2. Increase in density of diffuse ascites which may indicate some  degree of increasing hemoperitoneum.  3. Unchanged cirrhotic configuration of the liver with left hepatic  mass.  4. Unchanged splenomegaly suggestive of portal venous hypertension.  5. Calcific cholelithiasis.     [Critical Result: Increase in density of diffuse ascites which may  indicate some degree of increasing hemoperitoneum]    Finding was identified on 11/3/2021 9:33 PM.     Dr. Ivet Hernandez was contacted by Dr. LYNN CALZADA MD at  11/3/2021 9:51 PM and verbalized understanding of the critical  finding.     I have personally reviewed the examination and initial interpretation  and I agree with the findings.    SAKINA MACKEY MD         SYSTEM ID:  [id number]       Discharge Medications    Current Discharge Medication List      CONTINUE these medications which have NOT CHANGED    Details   furosemide (LASIX) 20 MG tablet TAKE ONE TABLET BY MOUTH EVERYDAY.  Qty: 80 tablet, Refills: 2    Associated Diagnoses: Peripheral edema      LORazepam (ATIVAN) 0.5 MG tablet Take 1 tablet (0.5 mg) by mouth every 4 hours as needed (Anxiety, Nausea/Vomiting or Sleep)  Qty: 30 tablet, Refills: 2    Associated Diagnoses: HCC (hepatocellular carcinoma) (H)      omeprazole (PRILOSEC) 40 MG DR capsule Take 1 capsule (40 mg) by mouth daily  Qty: 90 capsule, Refills: 0    Associated Diagnoses: Gastroesophageal reflux disease with esophagitis without hemorrhage      prochlorperazine (COMPAZINE) 10 MG tablet Take 1 tablet (10 mg) by mouth every 6 hours as needed (Nausea/Vomiting)  Qty: 30 tablet, Refills: 2    Associated Diagnoses: HCC (hepatocellular carcinoma) (H)      spironolactone (ALDACTONE) 50 MG tablet TAKE ONE TABLET BY MOUTH EVERY DAY  Qty: 90 tablet, Refills: 2    Associated Diagnoses: Peripheral edema           Allergies   No Known Allergies

## 2021-11-10 NOTE — PLAN OF CARE
Major Shift Events:  Pt VSS, afebrile and A&Ox4. Pt is on RA tolerating it well. Pt skin is very jaundiced. Pt has pain in the abdomen, giving prn pain meds as needed.    Plan: Pt will discharge to home with hospice today.     For vital signs and complete assessments, please see documentation flowsheets.

## 2021-11-10 NOTE — DISCHARGE SUMMARY
Pt discharged with [facility]. Pt being transferred home for hospice are. Pt VSS, afebrile and A&ox4. PT sent home with meds and discharge papers. All lines removed prior to discharge.

## 2021-11-10 NOTE — PROGRESS NOTES
Care Management Discharge Note    Discharge Date: 11/10/2021    Discharge Disposition:    Home with hospice services   [facility]  [address]   [address]  [place] [address]    Anticipated Discharge Services: Transportation, hospice   Anticipated Discharge DME: Hospice agency to provide    Discharge Transportation: Stretcher ride with [employer] at 11am. PCS Form completed and in chart.     Private pay costs discussed: transportation costs    PAS Confirmation Code: NA   Patient/family educated on Medicare website which has current facility and service quality ratings: yes     Education Provided on the Discharge Plan: yes   Persons Notified of Discharge Plans: Pt, pt's spouse (Petrona), provider, and hospice liason.   Patient/Family in Agreement with the Plan: yes     Handoff Referral Completed: No; BPA did not fire.     Additional Information:  Pt will be discharging today at 11am via stretcher ride with [employer]. Pt's medication will be sent to pharmacy and will be sent with pt upon discharge.    SW left vm with Petrona about discharge plan.    Poncho Dela Cruz, BECK, LGSW  [address]   [facility]  Phone: [phone]  Pager: [phone]

## 2021-11-10 NOTE — PLAN OF CARE
Comfort cares. Monitors removed per pts request. Scheduled lactulose given. C/o intermittent abd pain. Pt did not want any medications at this time. Plan to discharge home on hospice at 11am tomorrow.

## 2021-11-10 NOTE — PLAN OF CARE
Comfort cares. Discontinued monitoring per pt's request. Pt endorses pain, but cannot describe pain. Pain meds given this shift: 0.3 mg dilaudid IV x2, 10 mg oxycodone PO. Will continue to assess pt for s/s of pain and make comfortable as appropriate.    Plan: Pt to discharge to home on hospice at 11am on 11/10.

## 2022-06-02 PROBLEM — C22.0 HCC (HEPATOCELLULAR CARCINOMA) (H): Status: ACTIVE | Noted: 2021-01-01

## (undated) DEVICE — TUBING SUCTION 12"X1/4" N612

## (undated) DEVICE — SOL WATER IRRIG 500ML BOTTLE 2F7113

## (undated) DEVICE — GOWN IMPERVIOUS 2XL BLUE

## (undated) DEVICE — SUCTION CATH AIRLIFE TRI-FLO W/CONTROL PORT 14FR  T60C

## (undated) DEVICE — GLOVE EXAM NITRILE LG PF LATEX FREE 5064

## (undated) DEVICE — KIT ENDO TURNOVER/PROCEDURE CARRY-ON 101822

## (undated) DEVICE — ENDO BITE BLOCK ADULT OMNI-BLOC

## (undated) DEVICE — SUCTION MANIFOLD NEPTUNE 2 SYS 1 PORT 702-025-000

## (undated) DEVICE — SYR 30ML SLIP TIP W/O NDL 302833

## (undated) RX ORDER — FENTANYL CITRATE 50 UG/ML
INJECTION, SOLUTION INTRAMUSCULAR; INTRAVENOUS
Status: DISPENSED
Start: 2020-03-11

## (undated) RX ORDER — FENTANYL CITRATE 50 UG/ML
INJECTION, SOLUTION INTRAMUSCULAR; INTRAVENOUS
Status: DISPENSED
Start: 2021-01-01

## (undated) RX ORDER — LIDOCAINE HYDROCHLORIDE 10 MG/ML
INJECTION, SOLUTION EPIDURAL; INFILTRATION; INTRACAUDAL; PERINEURAL
Status: DISPENSED
Start: 2020-03-11

## (undated) RX ORDER — SODIUM CHLORIDE 9 MG/ML
INJECTION, SOLUTION INTRAVENOUS
Status: DISPENSED
Start: 2020-03-11